# Patient Record
Sex: FEMALE | Employment: FULL TIME | URBAN - METROPOLITAN AREA
[De-identification: names, ages, dates, MRNs, and addresses within clinical notes are randomized per-mention and may not be internally consistent; named-entity substitution may affect disease eponyms.]

---

## 2017-01-23 RX ORDER — GABAPENTIN 300 MG/1
CAPSULE ORAL
Qty: 60 CAPSULE | Refills: 11 | Status: SHIPPED | OUTPATIENT
Start: 2017-01-23 | End: 2017-08-21

## 2017-02-19 RX ORDER — OMEPRAZOLE 40 MG/1
CAPSULE, DELAYED RELEASE ORAL
Qty: 90 CAPSULE | Refills: 0 | Status: SHIPPED | OUTPATIENT
Start: 2017-02-19 | End: 2017-06-17

## 2017-03-31 ENCOUNTER — OFFICE VISIT (OUTPATIENT)
Dept: RHEUMATOLOGY | Facility: CLINIC | Age: 57
End: 2017-03-31

## 2017-03-31 VITALS
HEART RATE: 64 BPM | HEIGHT: 61 IN | DIASTOLIC BLOOD PRESSURE: 79 MMHG | WEIGHT: 204.63 LBS | SYSTOLIC BLOOD PRESSURE: 149 MMHG | BODY MASS INDEX: 38.63 KG/M2 | TEMPERATURE: 98 F

## 2017-03-31 DIAGNOSIS — G89.29 CHRONIC PAIN OF LEFT KNEE: ICD-10-CM

## 2017-03-31 DIAGNOSIS — M15.9 PRIMARY OSTEOARTHRITIS INVOLVING MULTIPLE JOINTS: ICD-10-CM

## 2017-03-31 DIAGNOSIS — F32.A DEPRESSION, UNSPECIFIED DEPRESSION TYPE: Primary | ICD-10-CM

## 2017-03-31 DIAGNOSIS — M79.7 FIBROMYALGIA: ICD-10-CM

## 2017-03-31 DIAGNOSIS — M25.562 CHRONIC PAIN OF LEFT KNEE: ICD-10-CM

## 2017-03-31 PROCEDURE — 99213 OFFICE O/P EST LOW 20 MIN: CPT | Performed by: INTERNAL MEDICINE

## 2017-03-31 PROCEDURE — 20610 DRAIN/INJ JOINT/BURSA W/O US: CPT | Performed by: INTERNAL MEDICINE

## 2017-03-31 RX ORDER — METHYLPREDNISOLONE ACETATE 40 MG/ML
40 INJECTION, SUSPENSION INTRA-ARTICULAR; INTRALESIONAL; INTRAMUSCULAR; SOFT TISSUE ONCE
Status: COMPLETED | OUTPATIENT
Start: 2017-03-31 | End: 2017-03-31

## 2017-03-31 NOTE — PROGRESS NOTES
HPI:    Patient ID: Laurel High is a 61year old female. HPI Comments: Elsa Floyd is a 16-year-old woman with diffuse myofascial pain, depression, and osteoarthritis. Her son is with her to interpret.   Since I saw her November 28th of 2016, she is taking Delayed Release TOME GINNY CAPSULA POR VIA ORAL TODOS LOS FOSTER Disp: 90 capsule Rfl: 0   GABAPENTIN 300 MG Oral Cap TAKE 1-2 CAPSULES BY MOUTH AT BEDTIME Disp: 60 capsule Rfl: 11   FLUARIX QUADRIVALENT 0.5 ML Intramuscular Suspension Prefilled Syringe TO BE help.  I increased the dose to 150 mg every morning. This should help her fibromyalgia as well. 4.  Left knee osteoarthritis. After informed consent was obtained, the knee was injected with 40 mg of Depo-Medrol and 1 cc 1% lidocaine.   This was tolerated

## 2017-03-31 NOTE — PROCEDURES
Joint Injection  Pre-procedure care:  Consent was obtained, Procedure/risks were explained, Questions were answered, Patient was prepped and draped in the usual sterile fashion, Correct side and site confirmed and Correct patient identified  Depression, un

## 2017-06-15 ENCOUNTER — HOSPITAL ENCOUNTER (EMERGENCY)
Facility: HOSPITAL | Age: 57
Discharge: HOME | End: 2017-06-15
Attending: EMERGENCY MEDICINE
Payer: COMMERCIAL

## 2017-06-15 ENCOUNTER — APPOINTMENT (OUTPATIENT)
Dept: ULTRASOUND IMAGING | Facility: HOSPITAL | Age: 57
End: 2017-06-15
Attending: EMERGENCY MEDICINE
Payer: COMMERCIAL

## 2017-06-15 VITALS
WEIGHT: 240 LBS | HEART RATE: 62 BPM | HEIGHT: 62 IN | BODY MASS INDEX: 44.16 KG/M2 | RESPIRATION RATE: 18 BRPM | SYSTOLIC BLOOD PRESSURE: 134 MMHG | OXYGEN SATURATION: 97 % | DIASTOLIC BLOOD PRESSURE: 48 MMHG | TEMPERATURE: 97 F

## 2017-06-15 DIAGNOSIS — M25.562 ACUTE PAIN OF LEFT KNEE: Primary | ICD-10-CM

## 2017-06-15 DIAGNOSIS — M19.91 PRIMARY OSTEOARTHRITIS, UNSPECIFIED SITE: ICD-10-CM

## 2017-06-15 PROCEDURE — 99284 EMERGENCY DEPT VISIT MOD MDM: CPT

## 2017-06-15 PROCEDURE — 93971 EXTREMITY STUDY: CPT | Performed by: EMERGENCY MEDICINE

## 2017-06-15 RX ORDER — NAPROXEN 500 MG/1
500 TABLET ORAL 2 TIMES DAILY PRN
Qty: 20 TABLET | Refills: 0 | Status: SHIPPED | OUTPATIENT
Start: 2017-06-15 | End: 2017-06-22

## 2017-06-15 NOTE — ED INITIAL ASSESSMENT (HPI)
Pt reports left knee pain r/t \"walking a lot\". Pt denies injury/trauma. Pt reports hx of arthritis.

## 2017-06-15 NOTE — ED PROVIDER NOTES
Patient Seen in: [facility] AND [facility] Emergency Department    History   Patient presents with:  Knee Pain    Stated Complaint: left leg pain    HPI    Patient is a 59-year-old female with a history of arthritis in her left knee.   She speaks Korean a family Heart Disease Neg      CAD         Smoking Status: Never Smoker                      Alcohol Use: No                Review of Systems    Positive for stated complaint: left leg pain  Other systems are as noted in HPI.   Constitutional and vital signs review Neurological: Alert and oriented to person, place, and time. Normal reflexes. No cranial nerve deficit. No motor os sensory defecits noted Coordination normal.   Skin: Skin is warm and dry. Psychiatric: Normal mood and affect.  Behavior is normal. Judgm

## 2017-06-15 NOTE — ED NOTES
Report from \[facility]\"". Pt back from [address]. No distress noted. Pt updated on plan of care. No needs voiced.

## 2017-06-19 ENCOUNTER — OFFICE VISIT (OUTPATIENT)
Dept: RHEUMATOLOGY | Facility: CLINIC | Age: 57
End: 2017-06-19

## 2017-06-19 VITALS
SYSTOLIC BLOOD PRESSURE: 135 MMHG | DIASTOLIC BLOOD PRESSURE: 74 MMHG | HEIGHT: 61 IN | HEART RATE: 62 BPM | BODY MASS INDEX: 38.33 KG/M2 | WEIGHT: 203 LBS

## 2017-06-19 DIAGNOSIS — M19.91 PRIMARY OSTEOARTHRITIS, UNSPECIFIED SITE: Primary | ICD-10-CM

## 2017-06-19 DIAGNOSIS — F32.A DEPRESSION, UNSPECIFIED DEPRESSION TYPE: ICD-10-CM

## 2017-06-19 DIAGNOSIS — M79.7 FIBROMYALGIA: ICD-10-CM

## 2017-06-19 PROCEDURE — 99213 OFFICE O/P EST LOW 20 MIN: CPT | Performed by: INTERNAL MEDICINE

## 2017-06-19 PROCEDURE — 99212 OFFICE O/P EST SF 10 MIN: CPT | Performed by: INTERNAL MEDICINE

## 2017-06-19 RX ORDER — OMEPRAZOLE 40 MG/1
CAPSULE, DELAYED RELEASE ORAL
Qty: 90 CAPSULE | Refills: 1 | Status: SHIPPED | OUTPATIENT
Start: 2017-06-19 | End: 2018-05-12

## 2017-06-19 NOTE — TELEPHONE ENCOUNTER
LOV:3-28  Last Filled:2-19, #90 with 0 refill  Labs:   Future Appointments  Date Time Provider Robert Crocker   6/19/2017 11:20 AM Conception MD Afsaneh 2014 [facility]       Please Advise

## 2017-06-19 NOTE — PROGRESS NOTES
HPI:    Patient ID: Sena Echavarria is a 61year old female. HPI Comments: Allison Guerrero is a 23-year-old woman with diffuse myofascial pain, depression, and osteoarthritis. Her son is with her to interpret.   Since I saw her March 31st of 2017, she increased he Disp: 60 capsule Rfl: 11   FLUARIX QUADRIVALENT 0.5 ML Intramuscular Suspension Prefilled Syringe TO BE ADMINISTERED BY PHARMACIST FOR IMMUNIZATION Disp:  Rfl: 0   Ibuprofen 200 MG Oral Cap Take 1 tablet by mouth as needed.  Disp:  Rfl:    Fenofibrate 160 M week for 6 weeks, to work on an overall exercise program, but especially leg strengthening. If the knee continues to cause trouble, she will probably need an MRI done. She will return in 2 months.       No orders of the defined types were placed in this e

## 2017-06-26 ENCOUNTER — TELEPHONE (OUTPATIENT)
Dept: RHEUMATOLOGY | Facility: CLINIC | Age: 57
End: 2017-06-26

## 2017-06-26 NOTE — TELEPHONE ENCOUNTER
Namibian speaking pt-Pt states her job is requesting a note that states she is okay now to start carrying minimum 20 pounds. Pt states to please reflex the date of 7/5 on note.  Pt states she needs her job, states due to financial issues, states she will be

## 2017-06-27 NOTE — TELEPHONE ENCOUNTER
Via , Daniel Rosenberg #[id number], note generated to return to work without restriction per pt. Note mailed to pt's home.

## 2017-07-17 ENCOUNTER — OFFICE VISIT (OUTPATIENT)
Dept: PHYSICAL THERAPY | Facility: HOSPITAL | Age: 57
End: 2017-07-17
Attending: INTERNAL MEDICINE
Payer: COMMERCIAL

## 2017-07-17 DIAGNOSIS — M19.91 PRIMARY OSTEOARTHRITIS, UNSPECIFIED SITE: ICD-10-CM

## 2017-07-17 DIAGNOSIS — M79.7 FIBROMYALGIA: ICD-10-CM

## 2017-07-17 PROCEDURE — 97162 PT EVAL MOD COMPLEX 30 MIN: CPT

## 2017-07-17 PROCEDURE — 97110 THERAPEUTIC EXERCISES: CPT

## 2017-07-17 NOTE — PROGRESS NOTES
LOWER EXTREMITY EVALUATION:   Referring Physician: Dr. Lawyer Lin  Date of Onset: June 11, 2017 Date of Service: 7/17/2017   Diagnosis: Primary osteoarthritis, unspecified site (M19.91)  Fibromyalgia (M79.7)  PATIENT SUMMARY:   Paulo Clifton is a 61 year Foot/Ankle   Flexion: R=WFL,4+/5; L=WFL,4/5  Extension: R=WFL,4-/5; L=WFL,4-/5  Abduction: R=WFL,4+/5; L=WFL,4-/5 Flexion: R=112,4+/5; L=105,4-/5  Extension: R=0,4+/5; L=0,4-/5    Grossly 4+/5 throughout bilaterally     Flexibility:   Hip Flexor: R= minima has agreed to actively participate in planning and for this course of care. Thank you for your referral. Please co-sign or sign and return this letter via fax as soon as possible to [phone].  If you have any questions, please contact me at Dept: [phone]

## 2017-07-21 ENCOUNTER — TELEPHONE (OUTPATIENT)
Dept: PHYSICAL THERAPY | Facility: HOSPITAL | Age: 57
End: 2017-07-21

## 2017-07-21 ENCOUNTER — OFFICE VISIT (OUTPATIENT)
Dept: RHEUMATOLOGY | Facility: CLINIC | Age: 57
End: 2017-07-21

## 2017-07-21 ENCOUNTER — HOSPITAL ENCOUNTER (OUTPATIENT)
Dept: GENERAL RADIOLOGY | Facility: HOSPITAL | Age: 57
Discharge: HOME | End: 2017-07-21
Attending: INTERNAL MEDICINE
Payer: COMMERCIAL

## 2017-07-21 VITALS
DIASTOLIC BLOOD PRESSURE: 74 MMHG | WEIGHT: 200.69 LBS | TEMPERATURE: 98 F | HEIGHT: 61 IN | SYSTOLIC BLOOD PRESSURE: 157 MMHG | BODY MASS INDEX: 37.89 KG/M2 | HEART RATE: 58 BPM

## 2017-07-21 DIAGNOSIS — M25.562 CHRONIC PAIN OF LEFT KNEE: ICD-10-CM

## 2017-07-21 DIAGNOSIS — G89.29 CHRONIC PAIN OF LEFT KNEE: ICD-10-CM

## 2017-07-21 DIAGNOSIS — F32.A DEPRESSION, UNSPECIFIED DEPRESSION TYPE: ICD-10-CM

## 2017-07-21 DIAGNOSIS — M79.7 FIBROMYALGIA: ICD-10-CM

## 2017-07-21 DIAGNOSIS — M19.91 PRIMARY OSTEOARTHRITIS, UNSPECIFIED SITE: Primary | ICD-10-CM

## 2017-07-21 PROCEDURE — 99212 OFFICE O/P EST SF 10 MIN: CPT | Performed by: INTERNAL MEDICINE

## 2017-07-21 PROCEDURE — 99213 OFFICE O/P EST LOW 20 MIN: CPT | Performed by: INTERNAL MEDICINE

## 2017-07-21 PROCEDURE — 73560 X-RAY EXAM OF KNEE 1 OR 2: CPT | Performed by: INTERNAL MEDICINE

## 2017-07-21 RX ORDER — COVID-19 ANTIGEN TEST
220 KIT MISCELLANEOUS AS NEEDED
COMMUNITY
End: 2018-03-19

## 2017-07-21 RX ORDER — PREDNISONE 1 MG/1
TABLET ORAL
Qty: 42 TABLET | Refills: 0 | Status: SHIPPED | OUTPATIENT
Start: 2017-07-21 | End: 2017-08-21 | Stop reason: ALTCHOICE

## 2017-07-21 NOTE — PROGRESS NOTES
HPI:    Patient ID: Tristen Nieto is a 61year old female. Katelin Bolanos is a 50-year-old woman with diffuse myofascial pain, depression, and osteoarthritis. Since I saw her June 19th of 2017, she has remained on sertraline 150 mg every morning.   It is helpin (ZOLOFT) 100 MG Oral Tab Take one Q AM Disp: 30 tablet Rfl: 2     Allergies:No Known Allergies   PHYSICAL EXAM:   Physical Exam   Constitutional: She is oriented to person, place, and time. She appears well-developed and well-nourished.    HENT:   Head: Nor

## 2017-07-22 ENCOUNTER — HOSPITAL ENCOUNTER (OUTPATIENT)
Dept: MAMMOGRAPHY | Facility: HOSPITAL | Age: 57
Discharge: HOME | End: 2017-07-22
Attending: FAMILY MEDICINE
Payer: COMMERCIAL

## 2017-07-22 DIAGNOSIS — Z12.31 ENCOUNTER FOR SCREENING MAMMOGRAM FOR MALIGNANT NEOPLASM OF BREAST: ICD-10-CM

## 2017-07-22 PROCEDURE — 77067 SCR MAMMO BI INCL CAD: CPT | Performed by: FAMILY MEDICINE

## 2017-07-24 ENCOUNTER — OFFICE VISIT (OUTPATIENT)
Dept: PHYSICAL THERAPY | Facility: HOSPITAL | Age: 57
End: 2017-07-24
Attending: INTERNAL MEDICINE
Payer: COMMERCIAL

## 2017-07-24 DIAGNOSIS — M79.7 FIBROMYALGIA: ICD-10-CM

## 2017-07-24 DIAGNOSIS — M19.91 PRIMARY OSTEOARTHRITIS, UNSPECIFIED SITE: ICD-10-CM

## 2017-07-24 PROCEDURE — 97530 THERAPEUTIC ACTIVITIES: CPT

## 2017-07-24 PROCEDURE — 97110 THERAPEUTIC EXERCISES: CPT

## 2017-07-28 ENCOUNTER — OFFICE VISIT (OUTPATIENT)
Dept: PHYSICAL THERAPY | Facility: HOSPITAL | Age: 57
End: 2017-07-28
Attending: INTERNAL MEDICINE
Payer: COMMERCIAL

## 2017-07-28 DIAGNOSIS — M79.7 FIBROMYALGIA: ICD-10-CM

## 2017-07-28 DIAGNOSIS — M19.91 PRIMARY OSTEOARTHRITIS, UNSPECIFIED SITE: ICD-10-CM

## 2017-07-28 PROCEDURE — 97530 THERAPEUTIC ACTIVITIES: CPT

## 2017-07-28 PROCEDURE — 97110 THERAPEUTIC EXERCISES: CPT

## 2017-07-28 NOTE — PROGRESS NOTES
Diagnosis:Primary osteoarthritis, unspecified site (M19.91)  Fibromyalgia (M79.7)  Visit #:  3 ([address] [facility] [address]      Next MD visit: none scheduled  Fall Risk: standard         Precautions: n/a           Medication Changes since last visit?: Aby Singer

## 2017-07-31 ENCOUNTER — OFFICE VISIT (OUTPATIENT)
Dept: PHYSICAL THERAPY | Facility: HOSPITAL | Age: 57
End: 2017-07-31
Attending: INTERNAL MEDICINE
Payer: COMMERCIAL

## 2017-07-31 DIAGNOSIS — M79.7 FIBROMYALGIA: ICD-10-CM

## 2017-07-31 DIAGNOSIS — M19.91 PRIMARY OSTEOARTHRITIS, UNSPECIFIED SITE: ICD-10-CM

## 2017-07-31 PROCEDURE — 97530 THERAPEUTIC ACTIVITIES: CPT

## 2017-07-31 PROCEDURE — 97110 THERAPEUTIC EXERCISES: CPT

## 2017-07-31 NOTE — PROGRESS NOTES
Diagnosis:Primary osteoarthritis, unspecified site (M19.91)  Fibromyalgia (M79.7)  Visit #:  4 ([address] [facility]      Next MD visit: none scheduled  Fall Risk: standard         Precautions: n/a           Medication Changes since last visit?: Daryl Gottron

## 2017-08-04 ENCOUNTER — OFFICE VISIT (OUTPATIENT)
Dept: PHYSICAL THERAPY | Facility: HOSPITAL | Age: 57
End: 2017-08-04
Attending: INTERNAL MEDICINE
Payer: COMMERCIAL

## 2017-08-04 DIAGNOSIS — M19.91 PRIMARY OSTEOARTHRITIS, UNSPECIFIED SITE: ICD-10-CM

## 2017-08-04 DIAGNOSIS — M79.7 FIBROMYALGIA: ICD-10-CM

## 2017-08-04 PROCEDURE — 97530 THERAPEUTIC ACTIVITIES: CPT

## 2017-08-04 PROCEDURE — 97110 THERAPEUTIC EXERCISES: CPT

## 2017-08-04 NOTE — PROGRESS NOTES
Diagnosis:Primary osteoarthritis, unspecified site (M19.91)  Fibromyalgia (M79.7)  Visit #:  5 ([address] [facility] [address]      Next MD visit: 8/21/17  Fall Risk: standard         Precautions: n/a           Medication Changes since last visit?: No  Rochelle Martini

## 2017-08-07 ENCOUNTER — APPOINTMENT (OUTPATIENT)
Dept: PHYSICAL THERAPY | Facility: HOSPITAL | Age: 57
End: 2017-08-07
Attending: INTERNAL MEDICINE
Payer: COMMERCIAL

## 2017-08-11 ENCOUNTER — OFFICE VISIT (OUTPATIENT)
Dept: PHYSICAL THERAPY | Facility: HOSPITAL | Age: 57
End: 2017-08-11
Attending: INTERNAL MEDICINE
Payer: COMMERCIAL

## 2017-08-11 DIAGNOSIS — M19.91 PRIMARY OSTEOARTHRITIS, UNSPECIFIED SITE: ICD-10-CM

## 2017-08-11 DIAGNOSIS — M79.7 FIBROMYALGIA: ICD-10-CM

## 2017-08-11 PROCEDURE — 97110 THERAPEUTIC EXERCISES: CPT

## 2017-08-11 PROCEDURE — 97530 THERAPEUTIC ACTIVITIES: CPT

## 2017-08-11 NOTE — PROGRESS NOTES
Diagnosis:Primary osteoarthritis, unspecified site (M19.91)  Fibromyalgia (M79.7)  Visit #:  6 ([address] [facility] [address]      Next MD visit: 8/21/17  Fall Risk: standard         Precautions: n/a           Medication Changes since last visit?: No  Rochelle Matrini

## 2017-08-18 ENCOUNTER — OFFICE VISIT (OUTPATIENT)
Dept: PHYSICAL THERAPY | Facility: HOSPITAL | Age: 57
End: 2017-08-18
Attending: INTERNAL MEDICINE
Payer: COMMERCIAL

## 2017-08-18 PROCEDURE — 97110 THERAPEUTIC EXERCISES: CPT

## 2017-08-18 PROCEDURE — 97530 THERAPEUTIC ACTIVITIES: CPT

## 2017-08-18 NOTE — PROGRESS NOTES
Patient Name: Candace Silver, : 3/10/1960, MRN: [id number]   Date:  2017  Referring Physician:  Eugene Puente    Diagnosis:Primary osteoarthritis, unspecified site (M19.91)  Fibromyalgia (M79.7)      Progress Summary    Pt has attended 7 vi good    Plan: Continue skilled Physical Therapy 1-2 x/week or a total of 3-4 visits over a 90 day period.  Treatment will include: LE/functional strengthening, reassessment, HEP    Patient was advised of these findings, precautions, and treatment options an

## 2017-08-18 NOTE — PROGRESS NOTES
Diagnosis:Primary osteoarthritis, unspecified site (M19.91)  Fibromyalgia (M79.7)  Visit #:  7 ([address] [facility]      Next MD visit: 8/21/17  Fall Risk: standard         Precautions: n/a           Medication Changes since last visit?: No  Jeannettepaola Kate toward greater/painfree functional independence--IN PROGRESS            Plan: Send note to MD    Charges:  TherEx 2, TA x 1       Total Timed Treatment: 42  min  Total Treatment Time: 44 min

## 2017-08-21 ENCOUNTER — OFFICE VISIT (OUTPATIENT)
Dept: RHEUMATOLOGY | Facility: CLINIC | Age: 57
End: 2017-08-21

## 2017-08-21 VITALS
SYSTOLIC BLOOD PRESSURE: 115 MMHG | WEIGHT: 202.88 LBS | HEART RATE: 69 BPM | DIASTOLIC BLOOD PRESSURE: 70 MMHG | HEIGHT: 61 IN | BODY MASS INDEX: 38.3 KG/M2

## 2017-08-21 DIAGNOSIS — M79.7 FIBROMYALGIA: ICD-10-CM

## 2017-08-21 DIAGNOSIS — F32.A DEPRESSION, UNSPECIFIED DEPRESSION TYPE: ICD-10-CM

## 2017-08-21 DIAGNOSIS — M19.91 PRIMARY OSTEOARTHRITIS, UNSPECIFIED SITE: Primary | ICD-10-CM

## 2017-08-21 PROCEDURE — 99213 OFFICE O/P EST LOW 20 MIN: CPT | Performed by: INTERNAL MEDICINE

## 2017-08-21 PROCEDURE — 99212 OFFICE O/P EST SF 10 MIN: CPT | Performed by: INTERNAL MEDICINE

## 2017-08-21 NOTE — PROGRESS NOTES
Branden Khalil is a 50-year-old woman with diffuse myofascial pain, depression, and osteoarthritis. Since I saw her July 21st 2017, she has remained on sertraline 150 mg every morning. It is helping more. She continues to have pain diffusely, but more managable. Rfl: 2     Allergies:No Known Allergies   PHYSICAL EXAM:   Physical Exam   Constitutional: She is oriented to person, place, and time. She appears well-developed and well-nourished. HENT:   Head: Normocephalic.    Eyes: Conjunctivae are normal.   Musculos

## 2017-12-15 NOTE — PROGRESS NOTES
Cici Callahan is a 58-year-old woman with diffuse myofascial pain, depression, and osteoarthritis. Since I saw her August 21st 2017, she has remained on sertraline 150 mg every morning. It is helping.   She continues to have pain diffusely, but more managable.    Physical Exam   Constitutional: She is oriented to person, place, and time. She appears well-developed and well-nourished. HENT:   Head: Normocephalic. Eyes: Conjunctivae are normal.   Musculoskeletal:    She walks without a limp.   There is mild tend

## 2017-12-18 ENCOUNTER — OFFICE VISIT (OUTPATIENT)
Dept: RHEUMATOLOGY | Facility: CLINIC | Age: 57
End: 2017-12-18

## 2017-12-18 VITALS
HEIGHT: 61 IN | HEART RATE: 73 BPM | DIASTOLIC BLOOD PRESSURE: 72 MMHG | SYSTOLIC BLOOD PRESSURE: 116 MMHG | WEIGHT: 201.88 LBS | BODY MASS INDEX: 38.11 KG/M2

## 2017-12-18 DIAGNOSIS — F32.A DEPRESSION, UNSPECIFIED DEPRESSION TYPE: ICD-10-CM

## 2017-12-18 DIAGNOSIS — M19.91 PRIMARY OSTEOARTHRITIS, UNSPECIFIED SITE: Primary | ICD-10-CM

## 2017-12-18 PROCEDURE — 99213 OFFICE O/P EST LOW 20 MIN: CPT | Performed by: INTERNAL MEDICINE

## 2017-12-18 PROCEDURE — 99212 OFFICE O/P EST SF 10 MIN: CPT | Performed by: INTERNAL MEDICINE

## 2017-12-18 RX ORDER — NORTRIPTYLINE HYDROCHLORIDE 10 MG/1
CAPSULE ORAL
Qty: 60 CAPSULE | Refills: 5 | Status: SHIPPED | OUTPATIENT
Start: 2017-12-18 | End: 2018-03-19

## 2017-12-18 RX ORDER — MELOXICAM 15 MG/1
15 TABLET ORAL DAILY
Status: ON HOLD | COMMUNITY
Start: 2017-11-15 | End: 2018-10-30

## 2018-03-15 NOTE — PROGRESS NOTES
Los Cooney is a 62-year-old woman with diffuse myofascial pain, depression, and osteoarthritis. Since I saw her December 18th 2017, she has remained on sertraline 150 mg every morning. It is helping.   She has also been taking nortriptyline 10 or 20 mg at bedti PHYSICAL EXAM:   Physical Exam   Constitutional: She is oriented to person, place, and time. She appears well-developed and well-nourished. HENT:   Head: Normocephalic. Eyes: Conjunctivae are normal.   Musculoskeletal:    She walks without a limp.   [facility]

## 2018-03-19 ENCOUNTER — OFFICE VISIT (OUTPATIENT)
Dept: RHEUMATOLOGY | Facility: CLINIC | Age: 58
End: 2018-03-19

## 2018-03-19 VITALS
HEIGHT: 61 IN | DIASTOLIC BLOOD PRESSURE: 75 MMHG | SYSTOLIC BLOOD PRESSURE: 119 MMHG | WEIGHT: 205.88 LBS | HEART RATE: 66 BPM | BODY MASS INDEX: 38.87 KG/M2

## 2018-03-19 DIAGNOSIS — M79.7 FIBROMYALGIA: ICD-10-CM

## 2018-03-19 DIAGNOSIS — F32.A DEPRESSION, UNSPECIFIED DEPRESSION TYPE: ICD-10-CM

## 2018-03-19 DIAGNOSIS — M19.91 PRIMARY OSTEOARTHRITIS, UNSPECIFIED SITE: Primary | ICD-10-CM

## 2018-03-19 PROCEDURE — 99212 OFFICE O/P EST SF 10 MIN: CPT | Performed by: INTERNAL MEDICINE

## 2018-03-19 PROCEDURE — 99213 OFFICE O/P EST LOW 20 MIN: CPT | Performed by: INTERNAL MEDICINE

## 2018-03-19 RX ORDER — NORTRIPTYLINE HYDROCHLORIDE 10 MG/1
CAPSULE ORAL
Qty: 180 CAPSULE | Refills: 3 | Status: SHIPPED | OUTPATIENT
Start: 2018-03-19 | End: 2020-03-02

## 2018-03-20 ENCOUNTER — TELEPHONE (OUTPATIENT)
Dept: RHEUMATOLOGY | Facility: CLINIC | Age: 58
End: 2018-03-20

## 2018-03-20 NOTE — TELEPHONE ENCOUNTER
Pt came in to see  yesterday and received a note for her employer. Unfortunately, the note is not what the employer wants. The employer needs the note to state that she's clear to work back to work. pls advise. Thank you    Pt can pick it up the letter.

## 2018-03-20 NOTE — TELEPHONE ENCOUNTER
Spoke with pt and informed let her know letter for work is ready for pickup in [place]. Address given patient verbalized understanding.

## 2018-04-16 ENCOUNTER — OFFICE VISIT (OUTPATIENT)
Dept: FAMILY MEDICINE CLINIC | Facility: CLINIC | Age: 58
End: 2018-04-16

## 2018-04-16 ENCOUNTER — LAB ENCOUNTER (OUTPATIENT)
Dept: LAB | Facility: HOSPITAL | Age: 58
End: 2018-04-16
Attending: FAMILY MEDICINE
Payer: COMMERCIAL

## 2018-04-16 VITALS
SYSTOLIC BLOOD PRESSURE: 137 MMHG | BODY MASS INDEX: 36.8 KG/M2 | HEIGHT: 62 IN | HEART RATE: 69 BPM | DIASTOLIC BLOOD PRESSURE: 77 MMHG | TEMPERATURE: 97 F | WEIGHT: 200 LBS

## 2018-04-16 DIAGNOSIS — E01.0 THYROMEGALY: ICD-10-CM

## 2018-04-16 DIAGNOSIS — M79.7 FIBROMYALGIA: ICD-10-CM

## 2018-04-16 DIAGNOSIS — R49.0 HOARSE VOICE QUALITY: ICD-10-CM

## 2018-04-16 DIAGNOSIS — H92.02 OTALGIA, LEFT: ICD-10-CM

## 2018-04-16 DIAGNOSIS — M19.91 PRIMARY OSTEOARTHRITIS, UNSPECIFIED SITE: ICD-10-CM

## 2018-04-16 DIAGNOSIS — G47.33 OBSTRUCTIVE SLEEP APNEA SYNDROME: Primary | ICD-10-CM

## 2018-04-16 DIAGNOSIS — G47.33 OBSTRUCTIVE SLEEP APNEA SYNDROME: ICD-10-CM

## 2018-04-16 DIAGNOSIS — R53.82 CHRONIC FATIGUE: ICD-10-CM

## 2018-04-16 PROCEDURE — 82306 VITAMIN D 25 HYDROXY: CPT

## 2018-04-16 PROCEDURE — 80050 GENERAL HEALTH PANEL: CPT

## 2018-04-16 PROCEDURE — 99212 OFFICE O/P EST SF 10 MIN: CPT | Performed by: FAMILY MEDICINE

## 2018-04-16 PROCEDURE — 84443 ASSAY THYROID STIM HORMONE: CPT

## 2018-04-16 PROCEDURE — 36415 COLL VENOUS BLD VENIPUNCTURE: CPT

## 2018-04-16 PROCEDURE — 99205 OFFICE O/P NEW HI 60 MIN: CPT | Performed by: FAMILY MEDICINE

## 2018-04-16 PROCEDURE — 80061 LIPID PANEL: CPT

## 2018-04-16 PROCEDURE — 85025 COMPLETE CBC W/AUTO DIFF WBC: CPT

## 2018-04-16 NOTE — PROGRESS NOTES
Fatigue  Hot flashes  Sweating all the time paula at work  Does assembly and packing at a warehouse. Pain in hands.     Has pain in fingers and knees    Years of throat discomfort and hoarse  Voice hoarse for years  >5 years  Snores heroically \"for years an THYROID STIM HORMONE; Future  - VITAMIN D, 25-HYDROXY; Future  - OP [facility] ALT REFERRAL HOME SLEEP APNEA TEST    3. Fibromyalgia  Stable  Nonrestorative sleep    4. Primary osteoarthritis, unspecified site  Follow-up with rheumatology    5.  Thyromegaly  Check

## 2018-04-23 ENCOUNTER — TELEPHONE (OUTPATIENT)
Dept: FAMILY MEDICINE CLINIC | Facility: CLINIC | Age: 58
End: 2018-04-23

## 2018-04-23 ENCOUNTER — HOSPITAL ENCOUNTER (OUTPATIENT)
Dept: ULTRASOUND IMAGING | Age: 58
Discharge: HOME | End: 2018-04-23
Attending: FAMILY MEDICINE
Payer: COMMERCIAL

## 2018-04-23 DIAGNOSIS — E01.0 THYROMEGALY: ICD-10-CM

## 2018-04-23 DIAGNOSIS — R53.82 CHRONIC FATIGUE: ICD-10-CM

## 2018-04-23 PROCEDURE — 76536 US EXAM OF HEAD AND NECK: CPT | Performed by: FAMILY MEDICINE

## 2018-04-23 NOTE — TELEPHONE ENCOUNTER
----- Message from Philippe Cameron DO sent at 4/23/2018  2:44 PM CDT -----  There is a nodule in the right lobe of the thyroid. There is enlargement of the thyroid as well. Follow-up with Dr. Javed Thacker for enlargement of the thyroid.

## 2018-04-24 ENCOUNTER — OFFICE VISIT (OUTPATIENT)
Dept: OTOLARYNGOLOGY | Facility: CLINIC | Age: 58
End: 2018-04-24

## 2018-04-24 VITALS
WEIGHT: 200 LBS | TEMPERATURE: 97 F | DIASTOLIC BLOOD PRESSURE: 82 MMHG | BODY MASS INDEX: 36.8 KG/M2 | SYSTOLIC BLOOD PRESSURE: 120 MMHG | HEIGHT: 62 IN

## 2018-04-24 DIAGNOSIS — G47.33 OBSTRUCTIVE SLEEP APNEA: Primary | ICD-10-CM

## 2018-04-24 DIAGNOSIS — R49.0 HOARSENESS: ICD-10-CM

## 2018-04-24 PROCEDURE — 99212 OFFICE O/P EST SF 10 MIN: CPT | Performed by: OTOLARYNGOLOGY

## 2018-04-24 PROCEDURE — 99243 OFF/OP CNSLTJ NEW/EST LOW 30: CPT | Performed by: OTOLARYNGOLOGY

## 2018-04-24 RX ORDER — FLUTICASONE PROPIONATE 50 MCG
1 SPRAY, SUSPENSION (ML) NASAL 2 TIMES DAILY
Qty: 1 BOTTLE | Refills: 3 | Status: SHIPPED | OUTPATIENT
Start: 2018-04-24 | End: 2018-11-22

## 2018-04-24 RX ORDER — MONTELUKAST SODIUM 10 MG/1
10 TABLET ORAL NIGHTLY
Qty: 30 TABLET | Refills: 3 | Status: SHIPPED | OUTPATIENT
Start: 2018-04-24 | End: 2018-08-04

## 2018-04-24 NOTE — TELEPHONE ENCOUNTER
Patient calling - verified patient's name and  - informed patient of doctor's note, provided phone # for Dr Burgess Dockery,  patient verbalized understanding. Mailed copy of US results to pt.

## 2018-04-24 NOTE — PROGRESS NOTES
Álvaro Lazar is a 60year old female.   Patient presents with:  Snoring: referred by Dr.C Francisco Cameron  for sleep apnea  Voice Problem: pt reports hoarseness for a while      HISTORY OF PRESENT ILLNESS  She presents with a history of skin snoring as noted by irregular heartbeat/palpitations and syncope. GI Negative Abdominal pain and diarrhea. Endocrine Negative Cold intolerance and heat intolerance. Neuro Negative Tremors. Psych Negative Anxiety and depression.    Integumentary Negative Frequent skin i Questions were answered. Correct patient identified. Correct side and site confirmed. A topical spray of ). 25% Neosynephrine was sprayed into the nose.     Laryngoscopy:  Flexible Fiberoptic Laryngoscopy: A diagnostic flexible fiberoptic laryngoscopy w tablet (160MG)  by oral route  every day, Disp: , Rfl:   •  simvastatin (ZOCOR) 20 MG Oral Tab, Take  by mouth. take 1 tablet (20MG)  by oral route  every day in the evening, Disp: , Rfl:   ASSESSMENT AND PLAN    1. Obstructive sleep apnea    2.  Hoarseness

## 2018-05-01 ENCOUNTER — TELEPHONE (OUTPATIENT)
Dept: OTHER | Age: 58
End: 2018-05-01

## 2018-05-01 DIAGNOSIS — E78.5 DYSLIPIDEMIA WITH ELEVATED LOW DENSITY LIPOPROTEIN (LDL) CHOLESTEROL AND ABNORMALLY LOW HIGH DENSITY LIPOPROTEIN CHOLESTEROL: ICD-10-CM

## 2018-05-01 DIAGNOSIS — E55.9 VITAMIN D DEFICIENCY: Primary | ICD-10-CM

## 2018-05-01 DIAGNOSIS — R73.9 ELEVATED BLOOD SUGAR: ICD-10-CM

## 2018-05-01 RX ORDER — ERGOCALCIFEROL 1.25 MG/1
50000 CAPSULE ORAL WEEKLY
Qty: 12 CAPSULE | Refills: 3 | Status: SHIPPED | OUTPATIENT
Start: 2018-05-01 | End: 2019-03-25

## 2018-05-01 NOTE — TELEPHONE ENCOUNTER
Notes recorded by Johnny Rubio DO on 4/24/2018 at 1:49 PM CDT  Vitamin D level was low.  If she has never had any kidney stones, she should begin vitamin D 50,000 units 1 p.o. weekly #12 with 3 refills.   Mildly elevated blood sugar mildly elevated b

## 2018-05-07 ENCOUNTER — OFFICE VISIT (OUTPATIENT)
Dept: FAMILY MEDICINE CLINIC | Facility: CLINIC | Age: 58
End: 2018-05-07

## 2018-05-07 VITALS
DIASTOLIC BLOOD PRESSURE: 78 MMHG | BODY MASS INDEX: 37.17 KG/M2 | WEIGHT: 202 LBS | HEART RATE: 72 BPM | SYSTOLIC BLOOD PRESSURE: 119 MMHG | HEIGHT: 62 IN

## 2018-05-07 DIAGNOSIS — E78.00 HIGH CHOLESTEROL: Primary | ICD-10-CM

## 2018-05-07 DIAGNOSIS — R53.82 CHRONIC FATIGUE: ICD-10-CM

## 2018-05-07 DIAGNOSIS — M79.7 FIBROMYALGIA: ICD-10-CM

## 2018-05-07 DIAGNOSIS — E01.0 THYROMEGALY: ICD-10-CM

## 2018-05-07 DIAGNOSIS — E78.1 HYPERTRIGLYCERIDEMIA: ICD-10-CM

## 2018-05-07 DIAGNOSIS — R79.89 LOW VITAMIN D LEVEL: ICD-10-CM

## 2018-05-07 DIAGNOSIS — E66.09 CLASS 2 OBESITY DUE TO EXCESS CALORIES WITHOUT SERIOUS COMORBIDITY WITH BODY MASS INDEX (BMI) OF 36.0 TO 36.9 IN ADULT: ICD-10-CM

## 2018-05-07 PROBLEM — E66.812 CLASS 2 OBESITY DUE TO EXCESS CALORIES WITHOUT SERIOUS COMORBIDITY WITH BODY MASS INDEX (BMI) OF 36.0 TO 36.9 IN ADULT: Status: ACTIVE | Noted: 2018-05-07

## 2018-05-07 PROCEDURE — 99214 OFFICE O/P EST MOD 30 MIN: CPT | Performed by: FAMILY MEDICINE

## 2018-05-07 PROCEDURE — 99212 OFFICE O/P EST SF 10 MIN: CPT | Performed by: FAMILY MEDICINE

## 2018-05-07 NOTE — PROGRESS NOTES
Has appt eith Dr. Solis Ear due to enlargement of thyroid    Lacks vit d    Chol still elevated despite simvastatin and fenofibrate. Has mildly jaime ldl cho  Trigs good. Weight difficult to control.         Patient's past medical surgical family social

## 2018-05-11 ENCOUNTER — OFFICE VISIT (OUTPATIENT)
Dept: ENDOCRINOLOGY CLINIC | Facility: CLINIC | Age: 58
End: 2018-05-11

## 2018-05-11 VITALS
WEIGHT: 203.63 LBS | HEIGHT: 62 IN | SYSTOLIC BLOOD PRESSURE: 132 MMHG | BODY MASS INDEX: 37.47 KG/M2 | DIASTOLIC BLOOD PRESSURE: 88 MMHG | HEART RATE: 68 BPM

## 2018-05-11 DIAGNOSIS — E04.1 THYROID NODULE: Primary | ICD-10-CM

## 2018-05-11 PROCEDURE — 99243 OFF/OP CNSLTJ NEW/EST LOW 30: CPT | Performed by: INTERNAL MEDICINE

## 2018-05-11 PROCEDURE — 99212 OFFICE O/P EST SF 10 MIN: CPT | Performed by: INTERNAL MEDICINE

## 2018-05-11 NOTE — H&P
New Patient Evaluation - History and Physical    CONSULT - Reason for Visit: Thyroid nodule  Requesting Physician: Dr. Shyanne Mendes:    Thyroid nodule     HISTORY OF PRESENT ILLNESS:   Emily Castillo is a 60year old female who presents for e by mouth nightly.  Disp: 30 tablet Rfl: 3       ALLERGIES:  No Known Allergies    SOCIAL HISTORY:    Social History    Marital status:              Spouse name:                       Years of education:                 Number of children: normal  ENT:  Normocephalic, atraumatic  NECK:  Supple, symmetrical, trachea midline, no adenopathy, thyroid symmetric, not enlarged and no tenderness  HEMATOLOGIC/LYMPHATICS:  no cervical lymphadenopathy and no supraclavicular lymphadenopathy  LUNGS: fabiano

## 2018-05-14 RX ORDER — OMEPRAZOLE 40 MG/1
CAPSULE, DELAYED RELEASE ORAL
Qty: 90 CAPSULE | Refills: 1 | Status: SHIPPED | OUTPATIENT
Start: 2018-05-14 | End: 2018-11-11

## 2018-05-14 NOTE — TELEPHONE ENCOUNTER
LOV:3-20  Last Filled:6-19, #90 with 1 refill  Labs:   Future Appointments  Date Time Provider Robert Zepedai   6/5/2018 11:30 AM Yang Frederick MD 40 [facility]   9/17/2018 11:40 AM Bogdan Casiano MD 2014 [facility]       Please Advise

## 2018-06-04 ENCOUNTER — OFFICE VISIT (OUTPATIENT)
Dept: SLEEP CENTER | Age: 58
End: 2018-06-04
Attending: FAMILY MEDICINE
Payer: COMMERCIAL

## 2018-06-04 DIAGNOSIS — R53.82 CHRONIC FATIGUE: Primary | ICD-10-CM

## 2018-06-04 PROCEDURE — 95806 SLEEP STUDY UNATT&RESP EFFT: CPT

## 2018-06-05 ENCOUNTER — OFFICE VISIT (OUTPATIENT)
Dept: OTOLARYNGOLOGY | Facility: CLINIC | Age: 58
End: 2018-06-05

## 2018-06-05 VITALS
TEMPERATURE: 98 F | DIASTOLIC BLOOD PRESSURE: 84 MMHG | WEIGHT: 200 LBS | SYSTOLIC BLOOD PRESSURE: 152 MMHG | HEIGHT: 62 IN | BODY MASS INDEX: 36.8 KG/M2

## 2018-06-05 DIAGNOSIS — R49.0 HOARSENESS: ICD-10-CM

## 2018-06-05 DIAGNOSIS — G47.33 OBSTRUCTIVE SLEEP APNEA: Primary | ICD-10-CM

## 2018-06-05 PROCEDURE — 99212 OFFICE O/P EST SF 10 MIN: CPT | Performed by: OTOLARYNGOLOGY

## 2018-06-05 PROCEDURE — 99214 OFFICE O/P EST MOD 30 MIN: CPT | Performed by: OTOLARYNGOLOGY

## 2018-06-05 NOTE — PROGRESS NOTES
Juancarlos Rosales is a 60year old female. Patient presents with:   Follow - Up: 1 month follow up- horseness of voice- per pt there is some changes/improvement of symptoms  Follow - Up: sleep study done yesterday june 4,2018      HISTORY OF PRESENT ILLNESS Fibromyalgia    • High cholesterol    • Hyperlipidemia        Past Surgical History:  No date: OTHER SURGICAL HISTORY      Comment: gall stones      REVIEW OF SYSTEMS    System Neg/Pos Details   Constitutional Negative Fatigue, fever and weight loss.    ENM Tonsils - Normal. Oropharynx - Normal.   Nose/Mouth/Throat Normal Nares - Right: Normal Left: Normal. Septum -Normal  Turbinates - Right: Normal, Left: Normal.       Current Outpatient Prescriptions:   •  OMEPRAZOLE 40 MG Oral Capsule Delayed ReleaseMADAY

## 2018-06-07 NOTE — PROCEDURES
[address] [place] [address]  Accredited by the [employer] ([employer]ASM)    PATIENT'S NAME: Dina Ann   ATTENDING PHYSICIAN: Karley Durham. Francisco Cameron DO   REFERRING PHYSICIAN: Karley Durham.  Francisco Cameron DO   PATIENT ACCOUNT #: [de-identified] LOCATION from this study is consistent with severe obstructive sleep apnea (ICD-10 code G47.33). RECOMMENDATIONS:    1. CPAP titration  2. Weight loss. 3.   Avoid alcohol. 4.   Avoid sedating drug. 5.   Patient should not drive if at all sleepy.     Please

## 2018-06-19 ENCOUNTER — TELEPHONE (OUTPATIENT)
Dept: FAMILY MEDICINE CLINIC | Facility: CLINIC | Age: 58
End: 2018-06-19

## 2018-06-19 DIAGNOSIS — G47.33 SEVERE OBSTRUCTIVE SLEEP APNEA: Primary | ICD-10-CM

## 2018-06-21 NOTE — TELEPHONE ENCOUNTER
LMTCB-ext [id number] today only until 4:30pm      Notes recorded by Aleta Jenkins DO on 6/9/2018 at 2:11 PM CDT  Patient has severe obstructive sleep apnea she stops breathing about 30 times per hour.  She should have a CPAP titration.  Please order CPAP

## 2018-07-03 ENCOUNTER — OFFICE VISIT (OUTPATIENT)
Dept: OTOLARYNGOLOGY | Facility: CLINIC | Age: 58
End: 2018-07-03

## 2018-07-03 VITALS
SYSTOLIC BLOOD PRESSURE: 119 MMHG | DIASTOLIC BLOOD PRESSURE: 74 MMHG | HEART RATE: 71 BPM | TEMPERATURE: 98 F | HEIGHT: 62 IN | BODY MASS INDEX: 36.8 KG/M2 | WEIGHT: 200 LBS

## 2018-07-03 DIAGNOSIS — G47.33 OBSTRUCTIVE SLEEP APNEA: ICD-10-CM

## 2018-07-03 DIAGNOSIS — R49.0 HOARSENESS: Primary | ICD-10-CM

## 2018-07-03 PROCEDURE — 99214 OFFICE O/P EST MOD 30 MIN: CPT | Performed by: OTOLARYNGOLOGY

## 2018-07-03 PROCEDURE — 99212 OFFICE O/P EST SF 10 MIN: CPT | Performed by: OTOLARYNGOLOGY

## 2018-07-03 NOTE — PROGRESS NOTES
Melissa Jenkins is a 60year old female. Patient presents with: Follow - Up: Pt presents with 1 month f/u for horseness. Pt states little better with medication. denies pain.       HISTORY OF PRESENT ILLNESS  She presents with a history of snoring as note hyperlipidemia   • Stroke Mother      CVA   • Diabetes Mother    • Cancer Neg    • Heart Disease Neg      CAD       Past Medical History:   Diagnosis Date   • Arthritis    • Esophageal reflux    • Fibromyalgia    • High cholesterol    • Hyperlipidemia - Normal.        Lymph Detail Normal Submental. Submandibular. Anterior cervical. Posterior cervical. Supraclavicular.         Nose/Mouth/Throat Normal External nose - Normal. Lips/teeth/gums - Normal. Tonsils - Normal. Oropharynx - Normal.   Nose/Mouth/Thr CWP.            Reuben Collier.  Cherylene Scotts, MD    7/3/2018    1:04 PM

## 2018-08-08 RX ORDER — MONTELUKAST SODIUM 10 MG/1
10 TABLET ORAL NIGHTLY
Qty: 30 TABLET | Refills: 0 | Status: SHIPPED | OUTPATIENT
Start: 2018-08-08 | End: 2019-02-16

## 2018-08-15 ENCOUNTER — TELEPHONE (OUTPATIENT)
Dept: OTOLARYNGOLOGY | Facility: CLINIC | Age: 58
End: 2018-08-15

## 2018-08-15 NOTE — TELEPHONE ENCOUNTER
Patient states she was not able to get CPAP machine since DME location was out of network. Would like a call back from clinical staff to see what else she can do. Please call. Thank you.

## 2018-08-20 NOTE — TELEPHONE ENCOUNTER
, please see note below. Per [facility] for Autopap, pt insurance has co pay of 20% which patient states she cannot afford to pay at this time even with payment plan offered. Per pt she cannot get Autopap at this time.

## 2018-08-20 NOTE — TELEPHONE ENCOUNTER
MASSIELTCB-Spoke with [facility], [phone], pt is not out of network, pt has 20% copay and pt does not want to pay, per [facility] they offered pt patient plan and pt refused.

## 2018-09-14 NOTE — PROGRESS NOTES
Gerson rob is a 67-year-old woman with diffuse myofascial pain, depression, and osteoarthritis. Since I saw her March 19th of 2018, she has remained on sertraline 150 mg every morning. It is helping.   She has also been taking nortriptyline 10 or 20 mg at bedti and well-nourished. HENT:   Head: Normocephalic. Eyes: Conjunctivae are normal.   Musculoskeletal:    She walks without a limp. There is mild tenderness to palpation of the muscles across her upper back and shoulders.   Her knees flex and extend fully

## 2018-09-17 ENCOUNTER — OFFICE VISIT (OUTPATIENT)
Dept: RHEUMATOLOGY | Facility: CLINIC | Age: 58
End: 2018-09-17
Payer: COMMERCIAL

## 2018-09-17 VITALS
HEART RATE: 67 BPM | DIASTOLIC BLOOD PRESSURE: 77 MMHG | BODY MASS INDEX: 37.04 KG/M2 | SYSTOLIC BLOOD PRESSURE: 119 MMHG | HEIGHT: 62 IN | WEIGHT: 201.31 LBS

## 2018-09-17 DIAGNOSIS — M79.7 FIBROMYALGIA: Primary | ICD-10-CM

## 2018-09-17 DIAGNOSIS — F32.A DEPRESSION, UNSPECIFIED DEPRESSION TYPE: ICD-10-CM

## 2018-09-17 DIAGNOSIS — M19.91 PRIMARY OSTEOARTHRITIS, UNSPECIFIED SITE: ICD-10-CM

## 2018-09-17 PROCEDURE — 99213 OFFICE O/P EST LOW 20 MIN: CPT | Performed by: INTERNAL MEDICINE

## 2018-09-17 PROCEDURE — 99212 OFFICE O/P EST SF 10 MIN: CPT | Performed by: INTERNAL MEDICINE

## 2018-10-08 ENCOUNTER — IMMUNIZATION (OUTPATIENT)
Dept: FAMILY MEDICINE CLINIC | Facility: CLINIC | Age: 58
End: 2018-10-08
Payer: COMMERCIAL

## 2018-10-08 DIAGNOSIS — Z23 NEED FOR VACCINATION: ICD-10-CM

## 2018-10-08 PROCEDURE — 90686 IIV4 VACC NO PRSV 0.5 ML IM: CPT | Performed by: FAMILY MEDICINE

## 2018-10-08 PROCEDURE — 90471 IMMUNIZATION ADMIN: CPT | Performed by: FAMILY MEDICINE

## 2018-10-23 RX ORDER — LORATADINE AND PSEUDOEPHEDRINE SULFATE 5; 120 MG/1; MG/1
TABLET, EXTENDED RELEASE ORAL
Qty: 60 TABLET | Refills: 3 | Status: SHIPPED | OUTPATIENT
Start: 2018-10-23 | End: 2018-11-16

## 2018-10-24 RX ORDER — LORATADINE AND PSEUDOEPHEDRINE SULFATE 5; 120 MG/1; MG/1
TABLET, EXTENDED RELEASE ORAL
Qty: 60 TABLET | OUTPATIENT
Start: 2018-10-24

## 2018-10-24 NOTE — TELEPHONE ENCOUNTER
Refill was sent in on 10-23-18 to [facility] in [facility] with three refills.

## 2018-10-28 ENCOUNTER — APPOINTMENT (OUTPATIENT)
Dept: ULTRASOUND IMAGING | Facility: HOSPITAL | Age: 58
DRG: 176 | End: 2018-10-28
Attending: HOSPITALIST
Payer: COMMERCIAL

## 2018-10-28 ENCOUNTER — APPOINTMENT (OUTPATIENT)
Dept: CT IMAGING | Facility: HOSPITAL | Age: 58
DRG: 176 | End: 2018-10-28
Attending: EMERGENCY MEDICINE
Payer: COMMERCIAL

## 2018-10-28 ENCOUNTER — HOSPITAL ENCOUNTER (INPATIENT)
Facility: HOSPITAL | Age: 58
LOS: 2 days | Discharge: HOME | DRG: 176 | End: 2018-10-30
Attending: EMERGENCY MEDICINE | Admitting: HOSPITALIST
Payer: COMMERCIAL

## 2018-10-28 ENCOUNTER — APPOINTMENT (OUTPATIENT)
Dept: GENERAL RADIOLOGY | Facility: HOSPITAL | Age: 58
DRG: 176 | End: 2018-10-28
Attending: EMERGENCY MEDICINE
Payer: COMMERCIAL

## 2018-10-28 DIAGNOSIS — I26.99 OTHER ACUTE PULMONARY EMBOLISM WITHOUT ACUTE COR PULMONALE (HCC): Primary | ICD-10-CM

## 2018-10-28 PROCEDURE — 99223 1ST HOSP IP/OBS HIGH 75: CPT | Performed by: HOSPITALIST

## 2018-10-28 PROCEDURE — 71260 CT THORAX DX C+: CPT | Performed by: EMERGENCY MEDICINE

## 2018-10-28 PROCEDURE — 71046 X-RAY EXAM CHEST 2 VIEWS: CPT | Performed by: EMERGENCY MEDICINE

## 2018-10-28 RX ORDER — ACETAMINOPHEN 325 MG/1
650 TABLET ORAL EVERY 4 HOURS PRN
Status: DISCONTINUED | OUTPATIENT
Start: 2018-10-28 | End: 2018-10-30

## 2018-10-28 RX ORDER — ONDANSETRON 2 MG/ML
4 INJECTION INTRAMUSCULAR; INTRAVENOUS EVERY 6 HOURS PRN
Status: DISCONTINUED | OUTPATIENT
Start: 2018-10-28 | End: 2018-10-30

## 2018-10-28 RX ORDER — HEPARIN SODIUM 1000 [USP'U]/ML
80 INJECTION, SOLUTION INTRAVENOUS; SUBCUTANEOUS ONCE
Status: COMPLETED | OUTPATIENT
Start: 2018-10-28 | End: 2018-10-28

## 2018-10-28 RX ORDER — HEPARIN SODIUM AND DEXTROSE 10000; 5 [USP'U]/100ML; G/100ML
18 INJECTION INTRAVENOUS ONCE
Status: DISCONTINUED | OUTPATIENT
Start: 2018-10-28 | End: 2018-10-28

## 2018-10-28 RX ORDER — MONTELUKAST SODIUM 10 MG/1
10 TABLET ORAL NIGHTLY
Status: DISCONTINUED | OUTPATIENT
Start: 2018-10-28 | End: 2018-10-30

## 2018-10-28 RX ORDER — BISACODYL 10 MG
10 SUPPOSITORY, RECTAL RECTAL
Status: DISCONTINUED | OUTPATIENT
Start: 2018-10-28 | End: 2018-10-30

## 2018-10-28 RX ORDER — HEPARIN SODIUM AND DEXTROSE 10000; 5 [USP'U]/100ML; G/100ML
INJECTION INTRAVENOUS CONTINUOUS
Status: DISCONTINUED | OUTPATIENT
Start: 2018-10-28 | End: 2018-10-28

## 2018-10-28 RX ORDER — HYDROCODONE BITARTRATE AND ACETAMINOPHEN 5; 325 MG/1; MG/1
2 TABLET ORAL EVERY 4 HOURS PRN
Status: DISCONTINUED | OUTPATIENT
Start: 2018-10-28 | End: 2018-10-30

## 2018-10-28 RX ORDER — METOCLOPRAMIDE HYDROCHLORIDE 5 MG/ML
10 INJECTION INTRAMUSCULAR; INTRAVENOUS EVERY 8 HOURS PRN
Status: DISCONTINUED | OUTPATIENT
Start: 2018-10-28 | End: 2018-10-30

## 2018-10-28 RX ORDER — SODIUM PHOSPHATE, DIBASIC AND SODIUM PHOSPHATE, MONOBASIC 7; 19 G/133ML; G/133ML
1 ENEMA RECTAL ONCE AS NEEDED
Status: DISCONTINUED | OUTPATIENT
Start: 2018-10-28 | End: 2018-10-30

## 2018-10-28 RX ORDER — IPRATROPIUM BROMIDE AND ALBUTEROL SULFATE 2.5; .5 MG/3ML; MG/3ML
3 SOLUTION RESPIRATORY (INHALATION) ONCE
Status: COMPLETED | OUTPATIENT
Start: 2018-10-28 | End: 2018-10-28

## 2018-10-28 RX ORDER — POTASSIUM CHLORIDE 20 MEQ/1
40 TABLET, EXTENDED RELEASE ORAL EVERY 4 HOURS
Status: COMPLETED | OUTPATIENT
Start: 2018-10-28 | End: 2018-10-28

## 2018-10-28 RX ORDER — HYDROCODONE BITARTRATE AND ACETAMINOPHEN 5; 325 MG/1; MG/1
1 TABLET ORAL EVERY 4 HOURS PRN
Status: DISCONTINUED | OUTPATIENT
Start: 2018-10-28 | End: 2018-10-30

## 2018-10-28 RX ORDER — NITROGLYCERIN 0.4 MG/1
0.4 TABLET SUBLINGUAL EVERY 5 MIN PRN
Status: DISCONTINUED | OUTPATIENT
Start: 2018-10-28 | End: 2018-10-30

## 2018-10-28 RX ORDER — SODIUM CHLORIDE 0.9 % (FLUSH) 0.9 %
3 SYRINGE (ML) INJECTION AS NEEDED
Status: DISCONTINUED | OUTPATIENT
Start: 2018-10-28 | End: 2018-10-30

## 2018-10-28 RX ORDER — HEPARIN SODIUM 1000 [USP'U]/ML
80 INJECTION, SOLUTION INTRAVENOUS; SUBCUTANEOUS ONCE
Status: DISCONTINUED | OUTPATIENT
Start: 2018-10-28 | End: 2018-10-28

## 2018-10-28 RX ORDER — POLYETHYLENE GLYCOL 3350 17 G/17G
17 POWDER, FOR SOLUTION ORAL DAILY PRN
Status: DISCONTINUED | OUTPATIENT
Start: 2018-10-28 | End: 2018-10-30

## 2018-10-28 RX ORDER — DOCUSATE SODIUM 100 MG/1
100 CAPSULE, LIQUID FILLED ORAL 2 TIMES DAILY
Status: DISCONTINUED | OUTPATIENT
Start: 2018-10-28 | End: 2018-10-30

## 2018-10-28 NOTE — ED NOTES
Report given to the floor RN  Peyton Loya. Pt stable for transfer, heparin gtt infusing.
Trop 0.07 dr Maxwell Go made aware.
57.6

## 2018-10-28 NOTE — ED PROVIDER NOTES
Patient Seen in: [facility] AND [facility] Emergency Department    History   Patient presents with:  Dyspnea KEN SOB (respiratory)    Stated Complaint: sob with a little chest pain     HPI    60year old female complains of episode of severe chest pain a few days Diabetes Mother    • Cancer Neg    • Heart Disease Neg         CAD       Social History    Tobacco Use      Smoking status: Never Smoker      Smokeless tobacco: Never Used    Alcohol use: No    Drug use: No      Review of Systems    Positive for stated com There is no tenderness. There is no rebound. Musculoskeletal: Normal range of motion. She exhibits no edema. Neurological: She is alert and oriented to person, place, and time. No cranial nerve deficit. She exhibits normal muscle tone.  She displays no EKG    Rate, intervals and axes as noted on EKG Report.   Rate: 86  Rhythm: Sinus Rhythm  Reading: Incomplete right bundle-branch block                Imaging Results Available and Reviewed while here: XR CHEST PA + LAT CHEST (CPT=71046)   Final Result I discussed these results, their acute management issues, and the need for follow-up, with the patient/epifanio. See radiology reports for details.         10/28/18  1400 10/28/18  1403   BP: (!) 132/94    Pulse: 81    Resp: 20    Temp:  98.2 °F (36.8 °C)

## 2018-10-28 NOTE — PLAN OF CARE
Patient Centered Care    • Patient preferences are identified and integrated in the patient's plan of care Progressing        Patient/Family Goals    • Patient/Family Long Term Goal Progressing    • Patient/Family Short Term Goal Progressing        Vss.  To

## 2018-10-28 NOTE — ED INITIAL ASSESSMENT (HPI)
Worsening shortness of breath/cough since Thursday. Patient gets easily fatigued.     Also felt some chest pain Thursday

## 2018-10-29 ENCOUNTER — APPOINTMENT (OUTPATIENT)
Dept: ULTRASOUND IMAGING | Facility: HOSPITAL | Age: 58
DRG: 176 | End: 2018-10-29
Attending: HOSPITALIST
Payer: COMMERCIAL

## 2018-10-29 ENCOUNTER — APPOINTMENT (OUTPATIENT)
Dept: CV DIAGNOSTICS | Facility: HOSPITAL | Age: 58
DRG: 176 | End: 2018-10-29
Attending: HOSPITALIST
Payer: COMMERCIAL

## 2018-10-29 PROCEDURE — 93306 TTE W/DOPPLER COMPLETE: CPT | Performed by: HOSPITALIST

## 2018-10-29 PROCEDURE — 93970 EXTREMITY STUDY: CPT | Performed by: HOSPITALIST

## 2018-10-29 PROCEDURE — 99233 SBSQ HOSP IP/OBS HIGH 50: CPT | Performed by: HOSPITALIST

## 2018-10-29 RX ORDER — CODEINE PHOSPHATE AND GUAIFENESIN 10; 100 MG/5ML; MG/5ML
10 SOLUTION ORAL EVERY 6 HOURS PRN
Status: DISCONTINUED | OUTPATIENT
Start: 2018-10-29 | End: 2018-10-30

## 2018-10-29 NOTE — H&P
[id number] [facility]  Patient Status:  Inpatient    3/10/1960  60year old [facility] [id number]   Location 320/320-A Attending Shira Soriano MD     PCP Lisa Comment.  DO Jennifer     ASSESSMENT/PLAN    Acute pulmon state    • Arthritis    • Depression    • Disorder of thyroid    • Esophageal reflux    • Fibromyalgia    • Hearing impairment     left ear   • High cholesterol    • Hyperlipidemia    • Hyperthyroidism    • Pneumonia due to organism    • Visual impairment Not on file      Years of education: Not on file      Highest education level: Not on file    Social Needs      Financial resource strain: Not on file      Food insecurity - worry: Not on file      Food insecurity - inability: Not on file      Transportati wounds  Skin: Neg for rash. Neuro: Neg for dizziness, focal weakness, LH, HA. Psych: Neg for suicidal ideas, confusion, agitation and depressed mood.    Other: All other review systems negative except what is mentioned in the HPI    PHYSICAL EXAM  Vital

## 2018-10-29 NOTE — PROGRESS NOTES
[facility]    Progress Note    July Bailey Patient Status:  Inpatient    3/10/1960 MRN [id number]   Location [facility] 3W/SW Attending Violette Khan MD   Hosp Day # 1 PCP Ricardo Martin.  DO Jennifer        Subjective:     Res 10/29/2018     10/29/2018    K 4.1 10/29/2018    K 4.1 10/29/2018     10/29/2018    CO2 23 10/29/2018     (H) 10/29/2018    CA 9.4 10/29/2018    ALB 4.2 04/16/2018    ALKPHO 54 04/16/2018    BILT 0.5 04/16/2018    TP 7.5 04/16/2018    AS has DIAZ and mild c hest discomfort while walking.   > Cont DOAC.   > Will need 6 months of treatment for unprovoked PE. US neg for DVT.   > f/U outpt with [facility] d/w Dr. Fredy Holloway.   > FU D Dimer outpt to monitor.      Adeel Howell MD

## 2018-10-29 NOTE — CM/SW NOTE
Patient's oxygenation level at rest on room air is 97%. Patients oxygenation level while ambulating on room air is 91%. Thank you.

## 2018-10-30 VITALS
RESPIRATION RATE: 18 BRPM | HEIGHT: 62 IN | OXYGEN SATURATION: 95 % | SYSTOLIC BLOOD PRESSURE: 150 MMHG | WEIGHT: 195.38 LBS | HEART RATE: 80 BPM | TEMPERATURE: 98 F | BODY MASS INDEX: 35.95 KG/M2 | DIASTOLIC BLOOD PRESSURE: 73 MMHG

## 2018-10-30 PROCEDURE — 99239 HOSP IP/OBS DSCHRG MGMT >30: CPT | Performed by: HOSPITALIST

## 2018-10-30 NOTE — DISCHARGE SUMMARY
[facility]    Discharge Summary    Laurel High Patient Status:  Inpatient    3/10/1960 MRN [id number]   Location [facility] 3W/SW Attending Cony Rosales MD   Hosp Day # 2 PCP Naga Rodriguez DO     Date of Admission: 1 to f/u. - Heparin drip, stopped. Started on eliquis. - SPo2 > 92% at rest and ambulation. - telemtetry NSR  - Dr Don Jones to follow with patient outpatient in 2-3 weeks. Baseline D. Dimer checked.    - education patient on signs and symptoms of bleeding    week.    Specialty:  Family Medicine  Contact information:  [address]             Anna Harry MD In 2 weeks.     Specialties:  Hematology and Oncology, ONCOLOGY, HEMATOLOGY  Contact information:  [address]

## 2018-10-30 NOTE — PROGRESS NOTES
3 [facility]. Lizet Mahmood. [id number]  ?  10/30/18  ? Re: Adriana Obregon  ? To Whom It May Concern:    Adriana Obregon was admitted to [facility] AND [facility] from 10/28/2018 to 10/30/18.     Please excuse Adriana Obregon from attendin

## 2018-10-30 NOTE — PAYOR COMM NOTE
--------------REQUESTING 2 ADDITIONAL DAYS    ADMISSION REVIEW     Payor: Nilsa Jeffery #:  [id number]  Authorization Number: [id number]    Admit date: 10/28/18  Admit time: 7824   DICHARGED 10/30      Admitting Physician: Mimi Moffett MD Fluticasone Propionate 50 MCG/ACT Nasal Suspension,  1 spray by Nasal route 2 (two) times daily. Nutritional Supplements (ESTROVEN OR),  Take by mouth as needed. Nortriptyline HCl 10 MG Oral Cap,  Take 1-2 PO Q HS.    Meloxicam 15 MG Oral Tab,  Take Head: Normocephalic and atraumatic. Head is without raccoon's eyes, without right periorbital erythema and without left periorbital erythema. Nose: Nose normal.   Mouth/Throat: Mucous membranes are normal. Mucous membranes are not pale and not cyanotic. All other components within normal limits   PROTHROMBIN TIME (PT) - Normal   PTT, ACTIVATED - Normal   BNP (B TYPE NATRIUERTIC PEPTIDE) - Normal   CBC WITH DIFFERENTIAL WITH PLATELET    Narrative:      The following orders were created for panel order CBC OTHER: Post cholecystectomy clips         =====    CONCLUSION:     1.  No acute cardiopulmonary disease                        Dictated by (CST): Paolo Mullins MD on 10/28/2018 at 14:47         Approved by (CST): Paolo Mullins MD on 10/28/2018 at 14 Other acute pulmonary embolism without acute cor pulmonale (HCC)  (primary encounter diagnosis)    Disposition:  Admit    Follow-up:  No follow-up provider specified.     Medications Prescribed:  Current Discharge Medication List        Present on Admission -Lower extremity Dopplers    Other problems  Obesity with a BMI of 36  Depression  Obstructive sleep apnea  Osteoarthritis  Fibromyalgia  Thyroid nodule, outpatient workup  Hypertriglyceridemia    DVT prophylaxis: Eliquis  CODE STATUS is full code  19 Bobbi Cabrera MONTELUKAST SODIUM 10 MG Oral Tab  TAKE 1 TABLET (10 MG TOTAL) BY MOUTH NIGHTLY.   Qty: 30 tablet Refills: 0    OMEPRAZOLE 40 MG Oral Capsule Delayed Release  TOME IGNNY CAPSULA POR VIA ORAL TODOS LOS FOSTER  Qty: 90 capsule Refills: 1    ergocalciferol 53755 u Special Diet: Not Asked        Back Care: Not Asked        Exercise: Not Asked        Bike Helmet: Not Asked        Seat Belt: Not Asked        Self-Exams: Not Asked    Social History Narrative      Not on file      FAMILY HISTORY  Family History   P Neuro: Normal reflexes, cranial nerves II through XII intact, strength is symmetric and 5 out of 5 throughout, sensory exam grossly intact, coordination and gait normal.  Skin: Skin is warm and dry. No rashes, erythema, diaphoresis.    MS: No open wounds, n Discharged on 10/30/2018    10/29/2018 2047 Given 10 mg Oral Aleja Singh RN      Normal Saline Flush 0.9 % injection 3 mL     Date Action Dose Route User    Discharged on 10/30/2018    10/29/2018 2047 Given 3 mL Intravenous Kaila Flores RN - echo, pending  - Heparin drip, stopped  - walking O2 stats, pt still SOB  - telemtetry  - Eliquis started 10/28/18  - Dr Karina Rodriguez to follow with patient outpatient in 2-3 weeks  - education patient on signs and symptoms of bleeding     Obesity  - BMI 36  - e CONCLUSION:  1. Multiple small nonobstructing pulmonary emboli bilaterally. No focal mass, consolidation, effusion or pulmonary infarction. 2. No abnormal mediastinal or hilar mass/adenopathy.  Lung findings consistent with air trapping and small airways di Other acute pulmonary embolism without acute cor pulmonale (HCC)  Active Problems:    Pulmonary embolus (HCC)        Greater than 35 minutes spent on discharge     Hospital Course:   Reason for Admission:    This is a 60year oldfemale without history of - Keep log at home and take to PCP visit. - May just be white coat HTN.                         Discharge Plan:   Discharge Condition: Good     Current Discharge Medication List     New Orders     !! apixaban 5 MG Oral Tab  Take 2 tablets (10 mg total) by FU with Dr. Yajaira Farfan (blood doctor) in 2 weeks  Take eliquis blood thinner. Do not take ASA or NSAID medication.    Rpt ECHO in 3 months         Niya Rios  10/30/2018            Signed by Lucio Turner MD on 10/30/2018 12:45 PM    Discharge Instructions

## 2018-10-30 NOTE — PLAN OF CARE
CARDIOVASCULAR - ADULT    • Maintains optimal cardiac output and hemodynamic stability Progressing        HEMATOLOGIC - ADULT    • Free from bleeding injury Progressing        PAIN - ADULT    • Verbalizes/displays adequate comfort level or patient's stated

## 2018-10-30 NOTE — PROGRESS NOTES
[address] [place]mood. [address]  ?  10/30/18  ? Re: Adriana Obregon  ? To Whom It May Concern:    Adriana Obregon was admitted to [facility] from 10/28/2018 to 10/30/18.     Please excuse Adriana Obregon from attendin

## 2018-10-31 ENCOUNTER — PATIENT OUTREACH (OUTPATIENT)
Dept: CASE MANAGEMENT | Age: 58
End: 2018-10-31

## 2018-10-31 DIAGNOSIS — I26.99 OTHER ACUTE PULMONARY EMBOLISM WITHOUT ACUTE COR PULMONALE (HCC): ICD-10-CM

## 2018-10-31 DIAGNOSIS — Z02.9 ENCOUNTERS FOR ADMINISTRATIVE PURPOSES: ICD-10-CM

## 2018-10-31 PROCEDURE — 1111F DSCHRG MED/CURRENT MED MERGE: CPT

## 2018-10-31 NOTE — PROGRESS NOTES
Initial Post Discharge Follow Up   Discharge Date: 10/30/18  Contact Date: 10/31/2018    Consent Verification:  Assessment Completed With: Patient  HIPAA Verified? Yes    Discharge Dx:    Other acute pulmonary embolism without acute cor pulmonale ([facility] capsule (50,000 Units total) by mouth once a week. Disp: 12 capsule Rfl: 3   Fluticasone Propionate 50 MCG/ACT Nasal Suspension 1 spray by Nasal route 2 (two) times daily.  Disp: 1 Bottle Rfl: 3   Nutritional Supplements (ESTROVEN OR) Take by mouth as neede Up Visit with Carrie Soto, [address] [facility], [address] (Janice)        [facility], [address], [address]

## 2018-11-05 ENCOUNTER — TELEPHONE (OUTPATIENT)
Dept: FAMILY MEDICINE CLINIC | Facility: CLINIC | Age: 58
End: 2018-11-05

## 2018-11-05 ENCOUNTER — OFFICE VISIT (OUTPATIENT)
Dept: FAMILY MEDICINE CLINIC | Facility: CLINIC | Age: 58
End: 2018-11-05
Payer: COMMERCIAL

## 2018-11-05 VITALS
HEIGHT: 62 IN | DIASTOLIC BLOOD PRESSURE: 65 MMHG | SYSTOLIC BLOOD PRESSURE: 112 MMHG | BODY MASS INDEX: 37.17 KG/M2 | HEART RATE: 78 BPM | WEIGHT: 202 LBS

## 2018-11-05 DIAGNOSIS — R53.82 CHRONIC FATIGUE: ICD-10-CM

## 2018-11-05 DIAGNOSIS — Z12.4 PAP SMEAR FOR CERVICAL CANCER SCREENING: ICD-10-CM

## 2018-11-05 DIAGNOSIS — E66.09 CLASS 2 OBESITY DUE TO EXCESS CALORIES WITHOUT SERIOUS COMORBIDITY WITH BODY MASS INDEX (BMI) OF 36.0 TO 36.9 IN ADULT: ICD-10-CM

## 2018-11-05 DIAGNOSIS — E78.00 HIGH CHOLESTEROL: ICD-10-CM

## 2018-11-05 DIAGNOSIS — E78.1 HYPERTRIGLYCERIDEMIA: ICD-10-CM

## 2018-11-05 DIAGNOSIS — G47.33 SEVERE OBSTRUCTIVE SLEEP APNEA: Primary | ICD-10-CM

## 2018-11-05 DIAGNOSIS — I26.99 OTHER ACUTE PULMONARY EMBOLISM WITHOUT ACUTE COR PULMONALE (HCC): ICD-10-CM

## 2018-11-05 DIAGNOSIS — Z12.31 VISIT FOR SCREENING MAMMOGRAM: ICD-10-CM

## 2018-11-05 PROCEDURE — 99495 TRANSJ CARE MGMT MOD F2F 14D: CPT | Performed by: FAMILY MEDICINE

## 2018-11-05 NOTE — TELEPHONE ENCOUNTER
Pt signed HIPAA and FCR in [facility] office. Pt will contact her HR to see if she can get an accomm form faxed to Forms dept. If a forms comes, then we will bill her. Pt agrees. Faxed off work letter to her HR and gave conf and letter to pt.

## 2018-11-05 NOTE — PROGRESS NOTES
Date of Admission: 10/28/2018   Date of Discharge: 10/30/2018     Admitting Diagnosis: Other acute pulmonary embolism without acute cor pulmonale (HCC) [I26.99]     Disposition: Home     Discharge Diagnosis: . Principal Problem:    Other acute pulmonary emb moderate    Patient seen within 7 days from date of discharge. Now still some sob and fatigue. No chest pain  No n/v  Was worse on Suday (yesterday.)  Wants to go back to work tomorrow.   I    Patient's past medical surgical family social history was cholesterol  Will recheck    3. Chronic fatigue  Worse after p.e.    4. Class 2 obesity due to excess calories without serious comorbidity with body mass index (BMI) of 36.0 to 36.9 in adult  Discussed weight loss    5. Hypertriglyceridemia  recheck    6.

## 2018-11-06 ENCOUNTER — OFFICE VISIT (OUTPATIENT)
Dept: SLEEP CENTER | Age: 58
End: 2018-11-06
Attending: FAMILY MEDICINE
Payer: COMMERCIAL

## 2018-11-06 DIAGNOSIS — Z76.89 SLEEP CONCERN: Primary | ICD-10-CM

## 2018-11-06 PROCEDURE — 95811 POLYSOM 6/>YRS CPAP 4/> PARM: CPT

## 2018-11-08 ENCOUNTER — HOSPITAL ENCOUNTER (OUTPATIENT)
Dept: MAMMOGRAPHY | Age: 58
Discharge: HOME | End: 2018-11-08
Attending: FAMILY MEDICINE
Payer: COMMERCIAL

## 2018-11-08 DIAGNOSIS — Z12.31 VISIT FOR SCREENING MAMMOGRAM: ICD-10-CM

## 2018-11-08 PROCEDURE — 77063 BREAST TOMOSYNTHESIS BI: CPT | Performed by: FAMILY MEDICINE

## 2018-11-08 PROCEDURE — 77067 SCR MAMMO BI INCL CAD: CPT | Performed by: FAMILY MEDICINE

## 2018-11-08 NOTE — TELEPHONE ENCOUNTER
Dr. Dov Williamson,    Pt brought a FMLA into the office. She is requesting light duty of no lifting more than 10 pounds starting from 11/12/18 - 1/12/1/19 in addition to the restrictions you placed in the letter with her hours. Do you approve? Please advise.

## 2018-11-09 NOTE — PROCEDURES
[address] [facility]  Accredited by the [employer] (AASM)    PATIENT'S NAME: Johnnie Salas   ATTENDING PHYSICIAN: Dianelys Rodriguez DO   REFERRING PHYSICIAN:    PATIENT ACCOUNT #: [de-identified] LOCATION: Sleep Center   MEDIC per hour for a combined arousal index of 17.6 events per hour. There were no significant periodic limb movements. The lowest desaturation was to 89%.   The average heart rate is 62 beats per minute, and there was no significant bradycardia, asystole, tach

## 2018-11-09 NOTE — TELEPHONE ENCOUNTER
Faxed signed FMLA to Pt's [address]. Pt will  at [facility] on Monday.

## 2018-11-12 ENCOUNTER — HOSPITAL ENCOUNTER (OUTPATIENT)
Dept: ULTRASOUND IMAGING | Age: 58
Discharge: HOME | End: 2018-11-12
Attending: INTERNAL MEDICINE
Payer: COMMERCIAL

## 2018-11-12 DIAGNOSIS — E04.1 THYROID NODULE: ICD-10-CM

## 2018-11-12 PROCEDURE — 76536 US EXAM OF HEAD AND NECK: CPT | Performed by: INTERNAL MEDICINE

## 2018-11-12 RX ORDER — OMEPRAZOLE 40 MG/1
CAPSULE, DELAYED RELEASE ORAL
Qty: 90 CAPSULE | Refills: 1 | Status: SHIPPED | OUTPATIENT
Start: 2018-11-12 | End: 2019-05-04

## 2018-11-12 NOTE — TELEPHONE ENCOUNTER
LOV: 9/17/18  Future Appointments   Date Time Provider Robert Zepedai   11/16/2018  9:00 AM Anna Harry MD [address] [place] [address] HEM ONC EMO   11/26/2018 10:45 AM Serafin Howell MD [facility] [place] [facility]   3/18/2019 11:00 AM Beatriz Mackay MD [address] [place] [address]

## 2018-11-12 NOTE — TELEPHONE ENCOUNTER
Pt picked up FMLA form at [facility] and dropped off STD if needed.

## 2018-11-14 ENCOUNTER — TELEPHONE (OUTPATIENT)
Dept: INTERNAL MEDICINE CLINIC | Facility: CLINIC | Age: 58
End: 2018-11-14

## 2018-11-14 NOTE — TELEPHONE ENCOUNTER
Short term disability forms came in via fax for PT. Forms were scanned to [employer] and originals were put in [employer] folder at [facility].  No Hippa signed nor payment taken

## 2018-11-14 NOTE — TELEPHONE ENCOUNTER
,    Pt was sent home from work on 11/12/18 b/c they couldn't allow her to work with the light duty/hours restrictions. Please sign off on form:  -Highlight the patient and hit \"Chart\" button.   -In Chart Review, w/in the Encounter tab - cli

## 2018-11-15 ENCOUNTER — NURSE TRIAGE (OUTPATIENT)
Dept: FAMILY MEDICINE CLINIC | Facility: CLINIC | Age: 58
End: 2018-11-15

## 2018-11-15 NOTE — TELEPHONE ENCOUNTER
DR CMC: please sign off pended rx. I called it into pharmacy. Message noted. Patient notified of MD's recommendation. Patient verbalized understanding. RX will be called in.  Spoke to Ehsan, pharmacist, [facility].

## 2018-11-15 NOTE — TELEPHONE ENCOUNTER
Action Requested: Summary for Provider     []  Critical Lab, Recommendations Needed  [] Need Additional Advice  []   FYI    []   Need Orders  [x] Need Medications Sent to Pharmacy  []  Other     SUMMARY: With Pakistani interpretor, cough with clear phlegm, h

## 2018-11-15 NOTE — TELEPHONE ENCOUNTER
Faxed disability form to [facility]. PSR will call pt to  and pay for form at [facility].

## 2018-11-16 ENCOUNTER — OFFICE VISIT (OUTPATIENT)
Dept: HEMATOLOGY/ONCOLOGY | Facility: HOSPITAL | Age: 58
End: 2018-11-16
Attending: INTERNAL MEDICINE
Payer: COMMERCIAL

## 2018-11-16 VITALS
SYSTOLIC BLOOD PRESSURE: 125 MMHG | TEMPERATURE: 98 F | BODY MASS INDEX: 37.17 KG/M2 | HEIGHT: 62 IN | WEIGHT: 202 LBS | DIASTOLIC BLOOD PRESSURE: 59 MMHG | HEART RATE: 80 BPM | RESPIRATION RATE: 16 BRPM

## 2018-11-16 DIAGNOSIS — I26.99 OTHER PULMONARY EMBOLISM WITHOUT ACUTE COR PULMONALE, UNSPECIFIED CHRONICITY (HCC): Primary | ICD-10-CM

## 2018-11-16 PROCEDURE — 99244 OFF/OP CNSLTJ NEW/EST MOD 40: CPT | Performed by: INTERNAL MEDICINE

## 2018-11-16 RX ORDER — CODEINE PHOSPHATE AND GUAIFENESIN 10; 100 MG/5ML; MG/5ML
SOLUTION ORAL
Qty: 200 ML | Refills: 0 | OUTPATIENT
Start: 2018-11-16 | End: 2019-02-16 | Stop reason: ALTCHOICE

## 2018-11-16 RX ORDER — CODEINE PHOSPHATE AND GUAIFENESIN 10; 100 MG/5ML; MG/5ML
5 SOLUTION ORAL EVERY 6 HOURS PRN
COMMUNITY
End: 2018-11-21

## 2018-11-16 NOTE — PROGRESS NOTES
HPI     Tony Pruett is a 60year old female here for evaluation of a Other pulmonary embolism without acute cor pulmonale, unspecified chronicity (hcc)  (primary encounter diagnosis)   First episode of thrombosis at 60years old.   Location  Bilateral Gastrointestinal: Negative for abdominal pain, blood in stool, constipation and diarrhea. Endocrine:        Hot flashes. Genitourinary: Negative for hematuria. Musculoskeletal: Positive for arthralgias, back pain and neck pain.         Zachd follows w evening Disp:  Rfl:      Allergies:   No Known Allergies    Past Medical History:   Diagnosis Date   • Anxiety state    • Arthritis    • Depression    • Disorder of thyroid    • Esophageal reflux    • Fibromyalgia    • Hearing impairment     left ear   • H Father         hyperlipidemia   • Stroke Mother         CVA   • Diabetes Mother    • Cancer Neg    • Heart Disease Neg         CAD         PHYSICAL EXAM:    /59 (BP Location: Left arm, Patient Position: Sitting, Cuff Size: adult)   Pulse 80   Temp 98 with OCP/HRT or pregnancy, pregnancy loss in 2nd or 3rd trimester.     Not recommended in patients > 49 y/o with first spontaneous VTE, patiens with active cancer, elderly patients with postoperative VTE, renal vein thrombosis, arterial thrombosis (save for duties without any restrictions. Letter was provided to the patient related to returning to work. The patient was very concerned about losing her job and is agreeable that she would like to return to work as soon as possible.       No orders of the define 14:30.     INDICATIONS:  Worsening shortness of breath/cough since Thursday. Patient gets easily fatigued. Also felt some chest pain Thursday. History of cholesterolemia, GERD hyperlipidemia and hypothyroidism. Elevated troponin.  Elevated d-dimer     [facility] Approved by (CST): Joie Alvarez MD on 10/28/2018 at 17:08

## 2018-11-21 ENCOUNTER — OFFICE VISIT (OUTPATIENT)
Dept: FAMILY MEDICINE CLINIC | Facility: CLINIC | Age: 58
End: 2018-11-21
Payer: COMMERCIAL

## 2018-11-21 VITALS
HEIGHT: 62 IN | HEART RATE: 71 BPM | TEMPERATURE: 98 F | DIASTOLIC BLOOD PRESSURE: 75 MMHG | SYSTOLIC BLOOD PRESSURE: 111 MMHG | WEIGHT: 202 LBS | BODY MASS INDEX: 37.17 KG/M2

## 2018-11-21 DIAGNOSIS — R21 RASH: ICD-10-CM

## 2018-11-21 DIAGNOSIS — H93.19 TINNITUS, UNSPECIFIED LATERALITY: Primary | ICD-10-CM

## 2018-11-21 DIAGNOSIS — J06.9 UPPER RESPIRATORY INFECTION, ACUTE: ICD-10-CM

## 2018-11-21 PROCEDURE — 99214 OFFICE O/P EST MOD 30 MIN: CPT | Performed by: FAMILY MEDICINE

## 2018-11-21 RX ORDER — CLOTRIMAZOLE AND BETAMETHASONE DIPROPIONATE 10; .64 MG/G; MG/G
CREAM TOPICAL
Qty: 60 G | Refills: 3 | Status: SHIPPED | OUTPATIENT
Start: 2018-11-21 | End: 2020-06-13

## 2018-11-21 RX ORDER — BENZONATATE 100 MG/1
100 CAPSULE ORAL 3 TIMES DAILY PRN
Qty: 20 CAPSULE | Refills: 0 | Status: SHIPPED | OUTPATIENT
Start: 2018-11-21 | End: 2019-02-16 | Stop reason: ALTCHOICE

## 2018-11-21 RX ORDER — AZITHROMYCIN 250 MG/1
TABLET, FILM COATED ORAL
Qty: 6 TABLET | Refills: 0 | Status: SHIPPED | OUTPATIENT
Start: 2018-11-21 | End: 2019-02-16 | Stop reason: ALTCHOICE

## 2018-11-21 NOTE — PROGRESS NOTES
HPI:    Patient ID: Trenda Curling is a 60year old female. Cough for more then 1 week. Phlegmy, greenish phlegm no improvement. Also nasal congestion and sinus pressure.    Tinnitus for more then a year   Rash underneath the right breast itchy        R (Patient taking differently: 10 mg nightly. Take 1-2 PO Q HS. ) Disp: 180 capsule Rfl: 3   Fenofibrate 160 MG Oral Tab Take  by mouth. take 1 tablet (160MG)  by oral route  every day Disp:  Rfl:    simvastatin (ZOCOR) 20 MG Oral Tab Take  by mouth.  take 1

## 2018-11-24 ENCOUNTER — TELEPHONE (OUTPATIENT)
Dept: OTHER | Age: 58
End: 2018-11-24

## 2018-11-24 NOTE — TELEPHONE ENCOUNTER
LMTCB  See DME referral.  Floor staff please fax referral with test results and face sheet to [facility] [phone]

## 2018-11-25 RX ORDER — FLUTICASONE PROPIONATE 50 MCG
SPRAY, SUSPENSION (ML) NASAL
Qty: 1 BOTTLE | Refills: 3 | Status: SHIPPED | OUTPATIENT
Start: 2018-11-25 | End: 2019-08-06

## 2018-11-26 ENCOUNTER — TELEPHONE (OUTPATIENT)
Dept: FAMILY MEDICINE CLINIC | Facility: CLINIC | Age: 58
End: 2018-11-26

## 2018-11-26 ENCOUNTER — OFFICE VISIT (OUTPATIENT)
Dept: ENDOCRINOLOGY CLINIC | Facility: CLINIC | Age: 58
End: 2018-11-26
Payer: COMMERCIAL

## 2018-11-26 VITALS
HEIGHT: 62 IN | DIASTOLIC BLOOD PRESSURE: 75 MMHG | SYSTOLIC BLOOD PRESSURE: 127 MMHG | BODY MASS INDEX: 37.17 KG/M2 | WEIGHT: 202 LBS | HEART RATE: 66 BPM

## 2018-11-26 PROCEDURE — 99213 OFFICE O/P EST LOW 20 MIN: CPT | Performed by: INTERNAL MEDICINE

## 2018-11-26 PROCEDURE — 99212 OFFICE O/P EST SF 10 MIN: CPT | Performed by: INTERNAL MEDICINE

## 2018-11-26 NOTE — TELEPHONE ENCOUNTER
Patient states she saw Dr. Kai Barreto on 11/21/18 for cough and her symptoms have improved but cough lingers. Patient states her cough improved slightly but is still not going away.  States she is almost done with cough medication and completed the Z radha alread

## 2018-11-26 NOTE — PROGRESS NOTES
Return Office Visit     CHIEF COMPLAINT:    Thyroid nodule     HISTORY OF PRESENT ILLNESS:  Juancarlos Rosales is a 60year old female who presents for follow up for for evaluation of thyroid nodule.      She was recently hospitalized for b/l PE.    Feels bett 1-2 PO Q HS. (Patient taking differently: 10 mg nightly. Take 1-2 PO Q HS. ) Disp: 180 capsule Rfl: 3   Fenofibrate 160 MG Oral Tab Take  by mouth.  take 1 tablet (160MG)  by oral route  every day Disp:  Rfl:    simvastatin (ZOCOR) 20 MG Oral Tab Take  by m auscultation bilaterally, no crackles or wheezing  CARDIOVASCULAR:  regular rate and rhythm, normal S1 and S2  ABDOMEN:  normal bowel sounds, soft, non-distended, non-tender  SKIN:  no bruising or bleeding, no rashes and no lesions  EXTREMITIES: normal pul

## 2018-12-06 NOTE — TELEPHONE ENCOUNTER
Patient has O health plan no referrals required.       Thank you, Tiago Reed Small-Referral Specialist.

## 2018-12-07 RX ORDER — APIXABAN 5 MG/1
TABLET, FILM COATED ORAL
Qty: 60 TABLET | Refills: 11 | Status: SHIPPED | OUTPATIENT
Start: 2018-12-07 | End: 2019-12-23

## 2018-12-07 NOTE — TELEPHONE ENCOUNTER
Requested Prescriptions     Pending Prescriptions Disp Refills   • ELIQUIS 5 MG Oral Tab [Pharmacy Med Name: ELIQUIS 5 MG TABLET] 60 tablet 0     Sig: MADAY Roe 45 Office Visit with PCP: 11/5/2018    Unable to fill per aaliyah

## 2018-12-11 ENCOUNTER — TELEPHONE (OUTPATIENT)
Dept: OTHER | Age: 58
End: 2018-12-11

## 2018-12-11 RX ORDER — BENZONATATE 100 MG/1
100 CAPSULE ORAL 3 TIMES DAILY PRN
Qty: 20 CAPSULE | Refills: 0 | OUTPATIENT
Start: 2018-12-11

## 2018-12-11 RX ORDER — CODEINE PHOSPHATE AND GUAIFENESIN 10; 100 MG/5ML; MG/5ML
SOLUTION ORAL
Qty: 200 ML | Refills: 0 | OUTPATIENT
Start: 2018-12-11

## 2018-12-11 RX ORDER — AZITHROMYCIN 250 MG/1
TABLET, FILM COATED ORAL
Qty: 6 TABLET | Refills: 0 | OUTPATIENT
Start: 2018-12-11

## 2018-12-11 NOTE — TELEPHONE ENCOUNTER
I see no call back from patient, so contacted patient and LMTCB with family member who refused to provide their name.

## 2018-12-11 NOTE — TELEPHONE ENCOUNTER
Patient calling back with  Heather Odonnell #[id number], advised Dr Nuvia Serrano note and verbalized understanding. States that she is feeling better now, no need to make a FU OV. Note      She needs to come for reevaluation if still sick.

## 2018-12-15 ENCOUNTER — OFFICE VISIT (OUTPATIENT)
Dept: OTOLARYNGOLOGY | Facility: CLINIC | Age: 58
End: 2018-12-15
Payer: COMMERCIAL

## 2018-12-15 ENCOUNTER — OFFICE VISIT (OUTPATIENT)
Dept: AUDIOLOGY | Facility: CLINIC | Age: 58
End: 2018-12-15
Payer: COMMERCIAL

## 2018-12-15 VITALS
BODY MASS INDEX: 37.17 KG/M2 | TEMPERATURE: 97 F | DIASTOLIC BLOOD PRESSURE: 80 MMHG | SYSTOLIC BLOOD PRESSURE: 121 MMHG | WEIGHT: 202 LBS | HEIGHT: 62 IN

## 2018-12-15 DIAGNOSIS — H93.11 TINNITUS AURIUM, RIGHT: Primary | ICD-10-CM

## 2018-12-15 DIAGNOSIS — H93.13 TINNITUS, BILATERAL: Primary | ICD-10-CM

## 2018-12-15 PROCEDURE — 99212 OFFICE O/P EST SF 10 MIN: CPT | Performed by: OTOLARYNGOLOGY

## 2018-12-15 PROCEDURE — 99214 OFFICE O/P EST MOD 30 MIN: CPT | Performed by: OTOLARYNGOLOGY

## 2018-12-15 PROCEDURE — 92557 COMPREHENSIVE HEARING TEST: CPT | Performed by: AUDIOLOGIST

## 2018-12-15 PROCEDURE — 92567 TYMPANOMETRY: CPT | Performed by: AUDIOLOGIST

## 2018-12-15 RX ORDER — MELOXICAM 15 MG/1
TABLET ORAL
Refills: 1 | COMMUNITY
Start: 2018-12-01 | End: 2019-02-16 | Stop reason: ALTCHOICE

## 2018-12-15 RX ORDER — OFLOXACIN 3 MG/ML
3 SOLUTION/ DROPS OPHTHALMIC 3 TIMES DAILY
Qty: 1 BOTTLE | Refills: 0 | Status: SHIPPED | OUTPATIENT
Start: 2018-12-15 | End: 2018-12-22

## 2018-12-15 RX ORDER — LORATADINE 10 MG/1
10 TABLET ORAL DAILY
Qty: 30 TABLET | Refills: 3 | Status: SHIPPED | OUTPATIENT
Start: 2018-12-15 | End: 2019-02-16

## 2018-12-15 RX ORDER — MONTELUKAST SODIUM 10 MG/1
10 TABLET ORAL NIGHTLY
Qty: 30 TABLET | Refills: 3 | Status: SHIPPED | OUTPATIENT
Start: 2018-12-15 | End: 2020-06-13

## 2018-12-15 NOTE — PROGRESS NOTES
Álvaro Lazar is a 60year old female.   Patient presents with:  Ringing In Ear: ringing of both ears for 5 years      HISTORY OF PRESENT ILLNESS  She presents with a history of snoring as noted by her . Renetta Jamie does complain of chronic fatigue throug Neurological Disorder Father         parkinson's disesae   • Arthritis Father    • Lipids Father         hyperlipidemia   • Stroke Mother         CVA   • Diabetes Mother    • Cancer Neg    • Heart Disease Neg         CAD       Past Medical History:   Diagn nerves II through XII grossly intact.    Head/Face Normal Facial features - Normal. Eyebrows - Normal. Skull - Normal.        Nasopharynx Normal External nose - Normal. Lips/teeth/gums - Normal. Tonsils - Normal. Oropharynx - Normal.   Ears Normal Inspectio mouth. take 1 tablet (160MG)  by oral route  every day, Disp: , Rfl:   •  simvastatin (ZOCOR) 20 MG Oral Tab, Take  by mouth.  take 1 tablet (20MG)  by oral route  every day in the evening, Disp: , Rfl:   •  Meloxicam 15 MG Oral Tab, TOME GINNY TABLETA POR V?

## 2018-12-15 NOTE — PROGRESS NOTES
AUDIOGRAM     Nile Magaña was referred for testing by No ref. provider found. 3/10/1960  [id number]      Otoscopic Inspection:  both ears: no cerumen    Audiometric Test Results: The patient was tested using air and bone audiometry.   The audiometric

## 2018-12-17 ENCOUNTER — TELEPHONE (OUTPATIENT)
Dept: OTOLARYNGOLOGY | Facility: CLINIC | Age: 58
End: 2018-12-17

## 2018-12-17 NOTE — TELEPHONE ENCOUNTER
Called pt to inform pt that no authorization is requiredr for MRI Brain/IACS ( W+WO) cpt [id number]. B-152, spoke to, [facility], reference number [id number]. Pt does need to contact [facility] for any out of pocket expense.

## 2018-12-29 ENCOUNTER — HOSPITAL ENCOUNTER (OUTPATIENT)
Dept: MRI IMAGING | Age: 58
Discharge: HOME | End: 2018-12-29
Attending: OTOLARYNGOLOGY
Payer: COMMERCIAL

## 2018-12-29 DIAGNOSIS — H93.11 TINNITUS AURIUM, RIGHT: ICD-10-CM

## 2018-12-29 PROCEDURE — A9575 INJ GADOTERATE MEGLUMI 0.1ML: HCPCS | Performed by: OTOLARYNGOLOGY

## 2018-12-29 PROCEDURE — 70553 MRI BRAIN STEM W/O & W/DYE: CPT | Performed by: OTOLARYNGOLOGY

## 2019-01-05 ENCOUNTER — OFFICE VISIT (OUTPATIENT)
Dept: OTOLARYNGOLOGY | Facility: CLINIC | Age: 59
End: 2019-01-05
Payer: COMMERCIAL

## 2019-01-05 VITALS
TEMPERATURE: 97 F | WEIGHT: 202 LBS | HEIGHT: 62 IN | SYSTOLIC BLOOD PRESSURE: 118 MMHG | BODY MASS INDEX: 37.17 KG/M2 | DIASTOLIC BLOOD PRESSURE: 74 MMHG

## 2019-01-05 DIAGNOSIS — H93.11 TINNITUS AURIUM, RIGHT: Primary | ICD-10-CM

## 2019-01-05 PROCEDURE — 99214 OFFICE O/P EST MOD 30 MIN: CPT | Performed by: OTOLARYNGOLOGY

## 2019-01-05 PROCEDURE — 99212 OFFICE O/P EST SF 10 MIN: CPT | Performed by: OTOLARYNGOLOGY

## 2019-01-05 NOTE — PROGRESS NOTES
Cora Rodrigez is a 60year old female. Patient presents with:   Follow - Up: MRI brain result done on  12/29/18      HISTORY OF PRESENT ILLNESS  She presents with a history of snoring as noted by her . Beba Hodgson does complain of chronic fatigue through file      Years of education: Not on file      Highest education level: Not on file    Tobacco Use      Smoking status: Never Smoker      Smokeless tobacco: Never Used    Substance and Sexual Activity      Alcohol use: No      Drug use: No    Other Topics Oriented to time, place, person & situation. Appropriate mood and affect.    Neck Exam Normal Inspection - Normal. Palpation - Normal. Parotid gland - Normal. Thyroid gland - Normal.   Eyes Normal Conjunctiva - Right: Normal, Left: Normal. Pupil - Right: No Disp: 200 mL, Rfl: 0  •  OMEPRAZOLE 40 MG Oral Capsule Delayed Release, TOME GINNY CAPSULA POR VIA ORAL TODOS LOS FOSTER, Disp: 90 capsule, Rfl: 1  •  Sertraline HCl 50 MG Oral Tab, Take 3 tablets (150 mg total) by mouth daily. , Disp: 270 tablet, Rfl: 3  •  MO

## 2019-01-10 ENCOUNTER — TELEPHONE (OUTPATIENT)
Dept: OTOLARYNGOLOGY | Facility: CLINIC | Age: 59
End: 2019-01-10

## 2019-01-10 RX ORDER — MONTELUKAST SODIUM 10 MG/1
10 TABLET ORAL NIGHTLY
Qty: 30 TABLET | Refills: 3 | Status: SHIPPED | OUTPATIENT
Start: 2019-01-10 | End: 2019-02-16

## 2019-01-10 NOTE — TELEPHONE ENCOUNTER
Per pt [facility] pharmacy has not received montelukast prescription, [facility] pharmacy has tried contacting office several times. Pls advise thank you.

## 2019-02-09 ENCOUNTER — NURSE TRIAGE (OUTPATIENT)
Dept: OTHER | Age: 59
End: 2019-02-09

## 2019-02-09 NOTE — TELEPHONE ENCOUNTER
Spoke with pt and her  ( Annamaria Robison) and was informed of MD recommendation, he stated pt is not really that sick, he is just hoping pt can get rx. He is not sure if pt can go to IC today due to its her grand daughter birthday party.   He said pt might g

## 2019-02-09 NOTE — TELEPHONE ENCOUNTER
Action Requested: Summary for Provider     []  Critical Lab, Recommendations Needed  [] Need Additional Advice  []   FYI    []   Need Orders  [x] Need Medications Sent to Pharmacy  []  Other     SUMMARY:  pt stated that she has been having a cough an

## 2019-02-10 ENCOUNTER — HOSPITAL ENCOUNTER (OUTPATIENT)
Age: 59
Discharge: HOME | End: 2019-02-10
Attending: EMERGENCY MEDICINE
Payer: COMMERCIAL

## 2019-02-10 VITALS
SYSTOLIC BLOOD PRESSURE: 146 MMHG | OXYGEN SATURATION: 95 % | TEMPERATURE: 98 F | WEIGHT: 204 LBS | RESPIRATION RATE: 16 BRPM | HEART RATE: 78 BPM | BODY MASS INDEX: 37 KG/M2 | DIASTOLIC BLOOD PRESSURE: 87 MMHG

## 2019-02-10 DIAGNOSIS — J01.40 ACUTE NON-RECURRENT PANSINUSITIS: Primary | ICD-10-CM

## 2019-02-10 LAB — S PYO AG THROAT QL: NEGATIVE

## 2019-02-10 PROCEDURE — 99214 OFFICE O/P EST MOD 30 MIN: CPT

## 2019-02-10 PROCEDURE — 87430 STREP A AG IA: CPT

## 2019-02-10 PROCEDURE — 99213 OFFICE O/P EST LOW 20 MIN: CPT

## 2019-02-10 RX ORDER — AMOXICILLIN 875 MG/1
875 TABLET, COATED ORAL 2 TIMES DAILY
Qty: 20 TABLET | Refills: 0 | Status: SHIPPED | OUTPATIENT
Start: 2019-02-10 | End: 2019-02-16

## 2019-02-10 RX ORDER — BENZONATATE 100 MG/1
100 CAPSULE ORAL 3 TIMES DAILY PRN
Qty: 30 CAPSULE | Refills: 0 | Status: SHIPPED | OUTPATIENT
Start: 2019-02-10 | End: 2019-03-12

## 2019-02-10 NOTE — ED PROVIDER NOTES
Patient Seen in: [facility] In [address] [place] [address]    History   Patient presents with:  Cough/URI    Stated Complaint: cough,congestion,flu symptoms    HPI    The patient is a 65-year-old female with a past history of fibromyalgia, hyperlipide Normocephalic, no swelling or tenderness  Eyes: Nonicteric sclera, no conjunctival injection  ENT: TMs are clear and flat bilaterally. There is mild posterior pharyngeal erythema.   There is mild diffuse sinus tenderness to palpation  Chest: Clear to auscu

## 2019-02-16 ENCOUNTER — OFFICE VISIT (OUTPATIENT)
Dept: FAMILY MEDICINE CLINIC | Facility: CLINIC | Age: 59
End: 2019-02-16
Payer: COMMERCIAL

## 2019-02-16 ENCOUNTER — TELEPHONE (OUTPATIENT)
Dept: FAMILY MEDICINE CLINIC | Facility: CLINIC | Age: 59
End: 2019-02-16

## 2019-02-16 VITALS
HEART RATE: 78 BPM | BODY MASS INDEX: 39 KG/M2 | SYSTOLIC BLOOD PRESSURE: 129 MMHG | TEMPERATURE: 98 F | WEIGHT: 212 LBS | DIASTOLIC BLOOD PRESSURE: 63 MMHG

## 2019-02-16 DIAGNOSIS — J01.01 ACUTE RECURRENT MAXILLARY SINUSITIS: Primary | ICD-10-CM

## 2019-02-16 PROCEDURE — 99212 OFFICE O/P EST SF 10 MIN: CPT | Performed by: FAMILY MEDICINE

## 2019-02-16 PROCEDURE — 99213 OFFICE O/P EST LOW 20 MIN: CPT | Performed by: FAMILY MEDICINE

## 2019-02-16 RX ORDER — AZELASTINE 1 MG/ML
2 SPRAY, METERED NASAL 2 TIMES DAILY
Qty: 1 BOTTLE | Refills: 1 | Status: SHIPPED | OUTPATIENT
Start: 2019-02-16 | End: 2019-03-30

## 2019-02-16 RX ORDER — AMOXICILLIN AND CLAVULANATE POTASSIUM 875; 125 MG/1; MG/1
1 TABLET, FILM COATED ORAL 2 TIMES DAILY
Qty: 10 TABLET | Refills: 0 | Status: SHIPPED | OUTPATIENT
Start: 2019-02-16 | End: 2019-02-21

## 2019-02-16 NOTE — PROGRESS NOTES
Where: nasal congestion and cough  Quality (Constant/Sharp?): sharp  Severity: mild mod pain  Duration: 3 weeks  Timing: constant  When does it occur: day and night.   Modifying factors:   Associated signs symptoms: cough   Physical exam  The patient is wel

## 2019-02-18 NOTE — TELEPHONE ENCOUNTER
Status has been changed. [employer] does not lucas DME for Aleena Salts PPO plans. Order will have to be faxed to the vendor.      Thank you,  [facility]

## 2019-03-12 ENCOUNTER — TELEPHONE (OUTPATIENT)
Dept: FAMILY MEDICINE CLINIC | Facility: CLINIC | Age: 59
End: 2019-03-12

## 2019-03-12 NOTE — TELEPHONE ENCOUNTER
VERY disappointed. Please make patient aware she has severe, life threatening sleep apnea. No joke, people day every day of sleep apnea. People don't die peacefully in there sleep, they have been dying for 20 years of sleep apnea.   Would recommend a tri

## 2019-03-12 NOTE — TELEPHONE ENCOUNTER
Pt informed of [facility]'s message response below in Nicaraguan. Pt voiced understanding and states is willing to at least try it wearing the CPAP. Called [facility] and informed Laura that pt states is willing to get the CPAP.      BR[facility]

## 2019-03-12 NOTE — TELEPHONE ENCOUNTER
[facility]avi Manriquez called in stating that they received order for CPAP, however, Pt is declining the machine. They wanted to make sure Dr. [facility] is aware. Please advise.

## 2019-03-13 NOTE — PROGRESS NOTES
Tucker Luna is a 26-year-old woman with diffuse myofascial pain, depression, and osteoarthritis. Since I saw her September 17th of 2018, she has remained on sertraline 150 mg every morning. It is helping.   She has also been taking nortriptyline 10 mg at bedtime Sertraline 150 mg every morning, Eliquis working OK. 4. Left knee osteoarthritis. Much improved after working with physical therapy. Increase exercises. 5.  Bone health. She will take 1500 mg of calcium and 1000 units of vitamin D daily.     I will s

## 2019-03-18 ENCOUNTER — OFFICE VISIT (OUTPATIENT)
Dept: RHEUMATOLOGY | Facility: CLINIC | Age: 59
End: 2019-03-18
Payer: COMMERCIAL

## 2019-03-18 VITALS
HEART RATE: 66 BPM | BODY MASS INDEX: 39.2 KG/M2 | WEIGHT: 213 LBS | SYSTOLIC BLOOD PRESSURE: 134 MMHG | HEIGHT: 62 IN | DIASTOLIC BLOOD PRESSURE: 81 MMHG

## 2019-03-18 DIAGNOSIS — F32.A DEPRESSION, UNSPECIFIED DEPRESSION TYPE: ICD-10-CM

## 2019-03-18 DIAGNOSIS — M79.7 FIBROMYALGIA: ICD-10-CM

## 2019-03-18 DIAGNOSIS — M19.91 PRIMARY OSTEOARTHRITIS, UNSPECIFIED SITE: Primary | ICD-10-CM

## 2019-03-18 PROCEDURE — 99213 OFFICE O/P EST LOW 20 MIN: CPT | Performed by: INTERNAL MEDICINE

## 2019-03-18 PROCEDURE — 99212 OFFICE O/P EST SF 10 MIN: CPT | Performed by: INTERNAL MEDICINE

## 2019-03-18 RX ORDER — LORATADINE 10 MG/1
10 TABLET ORAL DAILY
Refills: 3 | COMMUNITY
Start: 2019-03-09 | End: 2020-01-28

## 2019-03-25 RX ORDER — ERGOCALCIFEROL 1.25 MG/1
CAPSULE ORAL
Qty: 12 CAPSULE | Refills: 3 | Status: SHIPPED | OUTPATIENT
Start: 2019-03-25 | End: 2020-03-18

## 2019-03-30 ENCOUNTER — OFFICE VISIT (OUTPATIENT)
Dept: FAMILY MEDICINE CLINIC | Facility: CLINIC | Age: 59
End: 2019-03-30
Payer: COMMERCIAL

## 2019-03-30 ENCOUNTER — TELEPHONE (OUTPATIENT)
Dept: FAMILY MEDICINE CLINIC | Facility: CLINIC | Age: 59
End: 2019-03-30

## 2019-03-30 VITALS
DIASTOLIC BLOOD PRESSURE: 75 MMHG | BODY MASS INDEX: 38.46 KG/M2 | TEMPERATURE: 98 F | RESPIRATION RATE: 18 BRPM | WEIGHT: 209 LBS | SYSTOLIC BLOOD PRESSURE: 121 MMHG | HEART RATE: 76 BPM | HEIGHT: 62 IN

## 2019-03-30 DIAGNOSIS — R09.81 CHRONIC NASAL CONGESTION: Primary | ICD-10-CM

## 2019-03-30 PROCEDURE — 99212 OFFICE O/P EST SF 10 MIN: CPT | Performed by: FAMILY MEDICINE

## 2019-03-30 PROCEDURE — 99213 OFFICE O/P EST LOW 20 MIN: CPT | Performed by: FAMILY MEDICINE

## 2019-03-30 RX ORDER — AZITHROMYCIN 250 MG/1
TABLET, FILM COATED ORAL
Qty: 6 TABLET | Refills: 0 | Status: SHIPPED | OUTPATIENT
Start: 2019-03-30 | End: 2019-04-28 | Stop reason: ALTCHOICE

## 2019-03-30 RX ORDER — PREDNISONE 20 MG/1
20 TABLET ORAL 2 TIMES DAILY
Qty: 10 TABLET | Refills: 0 | Status: SHIPPED | OUTPATIENT
Start: 2019-03-30 | End: 2019-04-04

## 2019-03-30 NOTE — PROGRESS NOTES
Still no cpap machine    Where: nasal congestion and cough phlegm. Quality (Constant/Sharp?): sharp  Severity: mild mod pain  Duration: 3 months  Timing: constant  When does it occur: day and night.   Modifying factors:   Associated signs symptoms: cough

## 2019-04-26 RX ORDER — LORATADINE 10 MG/1
TABLET ORAL
Qty: 30 TABLET | Refills: 3 | Status: SHIPPED | OUTPATIENT
Start: 2019-04-26 | End: 2019-07-31

## 2019-04-28 ENCOUNTER — APPOINTMENT (OUTPATIENT)
Dept: GENERAL RADIOLOGY | Age: 59
End: 2019-04-28
Attending: EMERGENCY MEDICINE
Payer: COMMERCIAL

## 2019-04-28 ENCOUNTER — HOSPITAL ENCOUNTER (OUTPATIENT)
Age: 59
Discharge: OTHER INSTITUTION | End: 2019-04-28
Attending: EMERGENCY MEDICINE
Payer: COMMERCIAL

## 2019-04-28 ENCOUNTER — APPOINTMENT (OUTPATIENT)
Dept: GENERAL RADIOLOGY | Facility: HOSPITAL | Age: 59
End: 2019-04-28
Attending: EMERGENCY MEDICINE
Payer: COMMERCIAL

## 2019-04-28 ENCOUNTER — HOSPITAL ENCOUNTER (EMERGENCY)
Facility: HOSPITAL | Age: 59
Discharge: HOME | End: 2019-04-28
Attending: EMERGENCY MEDICINE
Payer: COMMERCIAL

## 2019-04-28 ENCOUNTER — APPOINTMENT (OUTPATIENT)
Dept: CT IMAGING | Facility: HOSPITAL | Age: 59
End: 2019-04-28
Attending: EMERGENCY MEDICINE
Payer: COMMERCIAL

## 2019-04-28 VITALS
TEMPERATURE: 98 F | HEART RATE: 78 BPM | OXYGEN SATURATION: 97 % | DIASTOLIC BLOOD PRESSURE: 85 MMHG | HEIGHT: 62 IN | RESPIRATION RATE: 20 BRPM | WEIGHT: 209 LBS | SYSTOLIC BLOOD PRESSURE: 150 MMHG | BODY MASS INDEX: 38.46 KG/M2

## 2019-04-28 VITALS
WEIGHT: 200 LBS | SYSTOLIC BLOOD PRESSURE: 124 MMHG | BODY MASS INDEX: 36.8 KG/M2 | RESPIRATION RATE: 19 BRPM | TEMPERATURE: 98 F | OXYGEN SATURATION: 95 % | DIASTOLIC BLOOD PRESSURE: 52 MMHG | HEART RATE: 68 BPM | HEIGHT: 62 IN

## 2019-04-28 DIAGNOSIS — J20.9 ACUTE BRONCHITIS WITH BRONCHOSPASM: Primary | ICD-10-CM

## 2019-04-28 DIAGNOSIS — R04.2 HEMOPTYSIS: ICD-10-CM

## 2019-04-28 DIAGNOSIS — R06.2 WHEEZING: Primary | ICD-10-CM

## 2019-04-28 DIAGNOSIS — R07.81 PLEURITIC CHEST PAIN: ICD-10-CM

## 2019-04-28 PROCEDURE — 99214 OFFICE O/P EST MOD 30 MIN: CPT

## 2019-04-28 PROCEDURE — 71260 CT THORAX DX C+: CPT | Performed by: EMERGENCY MEDICINE

## 2019-04-28 PROCEDURE — 93005 ELECTROCARDIOGRAM TRACING: CPT

## 2019-04-28 PROCEDURE — 85025 COMPLETE CBC W/AUTO DIFF WBC: CPT

## 2019-04-28 PROCEDURE — 93010 ELECTROCARDIOGRAM REPORT: CPT | Performed by: EMERGENCY MEDICINE

## 2019-04-28 PROCEDURE — 84484 ASSAY OF TROPONIN QUANT: CPT | Performed by: EMERGENCY MEDICINE

## 2019-04-28 PROCEDURE — 94640 AIRWAY INHALATION TREATMENT: CPT

## 2019-04-28 PROCEDURE — 80048 BASIC METABOLIC PNL TOTAL CA: CPT | Performed by: EMERGENCY MEDICINE

## 2019-04-28 PROCEDURE — 71046 X-RAY EXAM CHEST 2 VIEWS: CPT | Performed by: EMERGENCY MEDICINE

## 2019-04-28 PROCEDURE — 36415 COLL VENOUS BLD VENIPUNCTURE: CPT

## 2019-04-28 PROCEDURE — 99285 EMERGENCY DEPT VISIT HI MDM: CPT

## 2019-04-28 PROCEDURE — 93010 ELECTROCARDIOGRAM REPORT: CPT

## 2019-04-28 PROCEDURE — 85025 COMPLETE CBC W/AUTO DIFF WBC: CPT | Performed by: EMERGENCY MEDICINE

## 2019-04-28 PROCEDURE — 80048 BASIC METABOLIC PNL TOTAL CA: CPT

## 2019-04-28 RX ORDER — PREDNISONE 20 MG/1
60 TABLET ORAL ONCE
Status: COMPLETED | OUTPATIENT
Start: 2019-04-28 | End: 2019-04-28

## 2019-04-28 RX ORDER — ALBUTEROL SULFATE 90 UG/1
2 AEROSOL, METERED RESPIRATORY (INHALATION) EVERY 4 HOURS PRN
Qty: 1 INHALER | Refills: 0 | Status: SHIPPED | OUTPATIENT
Start: 2019-04-28 | End: 2019-05-28

## 2019-04-28 RX ORDER — IPRATROPIUM BROMIDE AND ALBUTEROL SULFATE 2.5; .5 MG/3ML; MG/3ML
3 SOLUTION RESPIRATORY (INHALATION) ONCE
Status: COMPLETED | OUTPATIENT
Start: 2019-04-28 | End: 2019-04-28

## 2019-04-28 RX ORDER — PREDNISONE 20 MG/1
TABLET ORAL
Qty: 12 TABLET | Refills: 0 | Status: SHIPPED | OUTPATIENT
Start: 2019-04-28 | End: 2019-08-06 | Stop reason: ALTCHOICE

## 2019-04-28 RX ORDER — ALBUTEROL SULFATE 2.5 MG/3ML
2.5 SOLUTION RESPIRATORY (INHALATION) ONCE
Status: COMPLETED | OUTPATIENT
Start: 2019-04-28 | End: 2019-04-28

## 2019-04-28 NOTE — ED PROVIDER NOTES
Patient Seen in: [facility] In [address] [place] [address]    History   Patient presents with:  Cough/URI  Shortness Of Breath  Chest Pressure    Stated Complaint: cough    HPI  She complains of 4 days of chest tightness, cough, shortness of breath an well-nourished. No distress. Well appearing   HENT:   Head: Normocephalic and atraumatic. Right Ear: External ear normal.   Left Ear: External ear normal.   Eyes: Conjunctivae and EOM are normal.   Neck: Normal range of motion. Neck supple.    Clayborn Veronica

## 2019-04-28 NOTE — ED INITIAL ASSESSMENT (HPI)
Pt to IC with productive cough x 4 days. States she is coughing up blood tinged sputum. Pt states she has anterior/posterior chest pain at center of chest. Reporting occasional shortness of breath while at rest and with activity.

## 2019-04-28 NOTE — ED INITIAL ASSESSMENT (HPI)
C/o cough, chest tightness/brian x4 days, hx of PE, states she is taking eliquis, however is not always compliant with regimen

## 2019-04-28 NOTE — ED PROVIDER NOTES
Patient Seen in: [facility]    History   Patient presents with:  Dyspnea KEN SOB (respiratory)    Stated Complaint: sob/ chest tightness/cough    HPI    19-year-old female with history of hyperlipidemia, hyperthyroidism, esoph SpO2 95%   BMI 36.58 kg/m²         Physical Exam      General Appearance: alert, no distress  Eyes: pupils equal and round no pallor or injection  ENT, Mouth: mucous membranes moist  Respiratory: there are no retractions, diffuse wheezes bilaterally  Car stay.  Will discharge home with a course of steroids along with albuterol MDI.             Disposition and Plan     Clinical Impression:  Acute bronchitis with bronchospasm  (primary encounter diagnosis)    Disposition:  Discharge  4/28/2019  4:00 pm    Fol

## 2019-04-30 ENCOUNTER — TELEPHONE (OUTPATIENT)
Dept: FAMILY MEDICINE CLINIC | Facility: CLINIC | Age: 59
End: 2019-04-30

## 2019-04-30 NOTE — TELEPHONE ENCOUNTER
Pt was seen in the [facility] ER on 4-28-19 and was told to make an appt to see her pcp. Pt would like to see Dr. Vee Ryan this week or next week in the morning at the [facility] location. There are no available appointments.      Please reply to pool: SAMSON Marinelli

## 2019-05-06 RX ORDER — OMEPRAZOLE 40 MG/1
CAPSULE, DELAYED RELEASE ORAL
Qty: 90 CAPSULE | Refills: 1 | Status: SHIPPED | OUTPATIENT
Start: 2019-05-06 | End: 2019-12-09

## 2019-05-06 NOTE — TELEPHONE ENCOUNTER
LOV: 3/18/19  Future Appointments   Date Time Provider Robert Crocker   5/7/2019 11:40 AM [facility]   9/16/2019 11:00 AM Jeffy Hwoell MD [address] [place] [address]     Please advise.

## 2019-05-07 ENCOUNTER — OFFICE VISIT (OUTPATIENT)
Dept: FAMILY MEDICINE CLINIC | Facility: CLINIC | Age: 59
End: 2019-05-07
Payer: COMMERCIAL

## 2019-05-07 VITALS
BODY MASS INDEX: 37.36 KG/M2 | HEIGHT: 62 IN | SYSTOLIC BLOOD PRESSURE: 111 MMHG | OXYGEN SATURATION: 97 % | TEMPERATURE: 98 F | RESPIRATION RATE: 20 BRPM | HEART RATE: 70 BPM | WEIGHT: 203 LBS | DIASTOLIC BLOOD PRESSURE: 73 MMHG

## 2019-05-07 DIAGNOSIS — R91.8 PULMONARY NODULES: ICD-10-CM

## 2019-05-07 DIAGNOSIS — Z86.711 HISTORY OF PULMONARY EMBOLUS (PE): ICD-10-CM

## 2019-05-07 DIAGNOSIS — J40 BRONCHITIS: ICD-10-CM

## 2019-05-07 DIAGNOSIS — R05.9 COUGH: Primary | ICD-10-CM

## 2019-05-07 PROCEDURE — 99214 OFFICE O/P EST MOD 30 MIN: CPT | Performed by: FAMILY MEDICINE

## 2019-05-07 PROCEDURE — 90670 PCV13 VACCINE IM: CPT | Performed by: FAMILY MEDICINE

## 2019-05-07 PROCEDURE — 90471 IMMUNIZATION ADMIN: CPT | Performed by: FAMILY MEDICINE

## 2019-05-07 PROCEDURE — 99212 OFFICE O/P EST SF 10 MIN: CPT | Performed by: FAMILY MEDICINE

## 2019-05-07 NOTE — PROGRESS NOTES
Coughing for 2 weeks but  \"I always kind of cough\"  Reviewed ct.  =====  CONCLUSION:   1. Tiny residual or recurrent pulmonary embolism involving a distal segmental branch of the left lower lobe.   Pulmonary embolic burden otherwise appears significantly rales  Heart was regular rate and rhythm normal S1-S2 no S3 no S4 there were no murmurs appreciated  Abdomen was soft non tender non distended bowel sounds were present  Extremities there was no cyanosis or edema      Assessment/ Plan  1.  Cough  ellipa con

## 2019-06-28 NOTE — TELEPHONE ENCOUNTER
Pharmacy requesting a refill on Jill Ellipta, order pending. Fluticasone Furoate-Vilanterol (BREO ELLIPTA) 100-25 MCG/INH Inhalation Aerosol Powder, Breath Activated 1 each 1 5/7/2019    Sig:   Inhale 1 puff into the lungs daily.      Route:   Inhalation

## 2019-06-28 NOTE — TELEPHONE ENCOUNTER
Please advise on refill request    Requested Prescriptions     Pending Prescriptions Disp Refills   • Fluticasone Furoate-Vilanterol (BREO ELLIPTA) 100-25 MCG/INH Inhalation Aerosol Powder, Breath Activated 1 each 1     Sig: Inhale 1 puff into the lungs da

## 2019-06-28 NOTE — TELEPHONE ENCOUNTER
Refill request  Current Outpatient Medications:   •  Fluticasone Furoate-Vilanterol (BREO ELLIPTA) 100-25 MCG/INH Inhalation Aerosol Powder, Breath Activated, Inhale 1 puff into the lungs daily. , Disp: 1 each, Rfl: 1

## 2019-07-03 RX ORDER — FLUTICASONE FUROATE AND VILANTEROL TRIFENATATE 100; 25 UG/1; UG/1
POWDER RESPIRATORY (INHALATION)
Qty: 180 EACH | Refills: 1 | Status: SHIPPED | OUTPATIENT
Start: 2019-07-03 | End: 2020-06-13

## 2019-07-03 NOTE — TELEPHONE ENCOUNTER
Refill passed per [facility], [facility] protocol.   Refill Protocol Appointment Criteria  · Appointment scheduled in the past 6 months or in the next 3 months  Recent Outpatient Visits            1 month ago Cough    [address], [address] [facility]

## 2019-08-01 RX ORDER — LORATADINE 10 MG/1
TABLET ORAL
Qty: 30 TABLET | Refills: 3 | Status: SHIPPED | OUTPATIENT
Start: 2019-08-01 | End: 2019-08-06

## 2019-08-03 ENCOUNTER — HOSPITAL ENCOUNTER (OUTPATIENT)
Dept: CT IMAGING | Facility: HOSPITAL | Age: 59
Discharge: HOME | End: 2019-08-03
Attending: FAMILY MEDICINE
Payer: COMMERCIAL

## 2019-08-03 DIAGNOSIS — R91.8 PULMONARY NODULES: ICD-10-CM

## 2019-08-03 DIAGNOSIS — Z86.711 HISTORY OF PULMONARY EMBOLUS (PE): ICD-10-CM

## 2019-08-03 PROCEDURE — 71250 CT THORAX DX C-: CPT | Performed by: FAMILY MEDICINE

## 2019-08-06 ENCOUNTER — OFFICE VISIT (OUTPATIENT)
Dept: PULMONOLOGY | Facility: CLINIC | Age: 59
End: 2019-08-06
Payer: COMMERCIAL

## 2019-08-06 VITALS
WEIGHT: 208 LBS | HEIGHT: 61 IN | BODY MASS INDEX: 39.27 KG/M2 | TEMPERATURE: 98 F | HEART RATE: 63 BPM | RESPIRATION RATE: 16 BRPM | DIASTOLIC BLOOD PRESSURE: 73 MMHG | OXYGEN SATURATION: 97 % | SYSTOLIC BLOOD PRESSURE: 129 MMHG

## 2019-08-06 DIAGNOSIS — I26.99 OTHER PULMONARY EMBOLISM WITHOUT ACUTE COR PULMONALE, UNSPECIFIED CHRONICITY (HCC): Primary | ICD-10-CM

## 2019-08-06 DIAGNOSIS — G47.33 OSA (OBSTRUCTIVE SLEEP APNEA): ICD-10-CM

## 2019-08-06 PROCEDURE — 99244 OFF/OP CNSLTJ NEW/EST MOD 40: CPT | Performed by: INTERNAL MEDICINE

## 2019-08-06 NOTE — PROGRESS NOTES
Dear Gavi Rock:           As you know, Dodie Carr is a 25-year-old female who I am now evaluating for abnormal CT scan the chest with history of pulmonary embolism, sleep apnea and pulmonary nodules.     HISTORY OF PRESENT ILLNESS: The patient has a mild chronic un with pupils equal round and reactive to light and accommodation. Neck without adenopathy, thyromegaly, JVD nor bruit. Lungs clear to auscultation and percussion. Cardiac regular rate and rhythm no murmur.  Abdomen nontender, without hepatosplenomegaly and n suggestion of a nodule identified on the CT scan. The patient will have a mammogram later this year. RECOMMENDATIONS:  1. As per primary care, follow-up mammography    I am delighted to assist in [place] [id number] care.             With warmest regards,     Nishant Weems

## 2019-08-12 DIAGNOSIS — Z12.39 SCREENING FOR BREAST CANCER: Primary | ICD-10-CM

## 2019-08-12 DIAGNOSIS — Z87.898 HISTORY OF LUMP OF RIGHT BREAST: ICD-10-CM

## 2019-08-14 NOTE — TELEPHONE ENCOUNTER
Dr Vinita Vela, this med listed as historical  Protocol failed.   Requested Prescriptions     Pending Prescriptions Disp Refills   • FENOFIBRATE 160 MG Oral Tab [Pharmacy Med Name: FENOFIBRATE 160 MG TABLET] 90 tablet 2     Sig: TOME GINNY TABLETA POR VIA ORAL [address], [address] [facility], [place],     Office Visit        Future Appointments       Provider Department Appt Notes    In 1 month Ce Segal MD [facility], [address] includes the [facility] [address] 6 month f/u    In 2 months Lucas Hatchet

## 2019-08-15 RX ORDER — FENOFIBRATE 160 MG/1
TABLET ORAL
Qty: 90 TABLET | Refills: 11 | Status: SHIPPED | OUTPATIENT
Start: 2019-08-15 | End: 2020-08-26

## 2019-08-26 ENCOUNTER — TELEPHONE (OUTPATIENT)
Dept: PULMONOLOGY | Facility: CLINIC | Age: 59
End: 2019-08-26

## 2019-08-26 NOTE — TELEPHONE ENCOUNTER
Pt calling to emerald welch rn regarding update on equipment for cpap from [facility], pt indicates she did receive a call in Roberto Simone from [address] [place] [address] and asked for them to return her call in [address] [facility], pt has not yet received a call from [facility].  Pls call pt with

## 2019-09-11 ENCOUNTER — NURSE TRIAGE (OUTPATIENT)
Dept: OTHER | Age: 59
End: 2019-09-11

## 2019-09-11 ENCOUNTER — TELEPHONE (OUTPATIENT)
Dept: FAMILY MEDICINE CLINIC | Facility: CLINIC | Age: 59
End: 2019-09-11

## 2019-09-11 NOTE — TELEPHONE ENCOUNTER
pt. States that she is having stomach pain and diarrhea for 3 weeks. Pt. States that she has Pepto Bismal, and Immodin.

## 2019-09-12 NOTE — PROGRESS NOTES
Dodie Carr is a 60-year-old woman with diffuse myofascial pain, depression, and osteoarthritis. Since I saw her March 18th of 2019, she has remained on sertraline 150 mg every morning. It is helping.   She has also been taking nortriptyline 10-20 mg at bedtime, bedtime. Her Rxs have been refilled. 3. Depression. Sertraline 150 mg every morning. 4.  Bone health. She will take 1500 mg of calcium and 1000 units of vitamin D daily. I will see her back in 6 months.

## 2019-09-16 ENCOUNTER — OFFICE VISIT (OUTPATIENT)
Dept: RHEUMATOLOGY | Facility: CLINIC | Age: 59
End: 2019-09-16
Payer: COMMERCIAL

## 2019-09-16 VITALS
HEIGHT: 62 IN | BODY MASS INDEX: 38.37 KG/M2 | DIASTOLIC BLOOD PRESSURE: 61 MMHG | HEART RATE: 66 BPM | SYSTOLIC BLOOD PRESSURE: 106 MMHG | WEIGHT: 208.5 LBS

## 2019-09-16 DIAGNOSIS — M19.91 PRIMARY OSTEOARTHRITIS, UNSPECIFIED SITE: Primary | ICD-10-CM

## 2019-09-16 DIAGNOSIS — F32.A DEPRESSION, UNSPECIFIED DEPRESSION TYPE: ICD-10-CM

## 2019-09-16 DIAGNOSIS — M79.7 FIBROMYALGIA: ICD-10-CM

## 2019-09-16 PROCEDURE — 99213 OFFICE O/P EST LOW 20 MIN: CPT | Performed by: INTERNAL MEDICINE

## 2019-10-13 NOTE — TELEPHONE ENCOUNTER
Please review; protocol failed.       Requested Prescriptions   Pending Prescriptions Disp Refills   • SIMVASTATIN 20 MG Oral Tab [Pharmacy Med Name: SIMVASTATIN 20 MG TABLET] 90 tablet 1     Sig: TAKE 1 TABLET(S) BY MOUTH AT BEDTIME       Cholesterol Medic

## 2019-10-14 RX ORDER — SIMVASTATIN 20 MG
TABLET ORAL
Qty: 90 TABLET | Refills: 11 | Status: SHIPPED | OUTPATIENT
Start: 2019-10-14 | End: 2020-10-22

## 2019-11-18 ENCOUNTER — OFFICE VISIT (OUTPATIENT)
Dept: PULMONOLOGY | Facility: CLINIC | Age: 59
End: 2019-11-18
Payer: COMMERCIAL

## 2019-11-18 VITALS
HEIGHT: 62 IN | WEIGHT: 208 LBS | DIASTOLIC BLOOD PRESSURE: 78 MMHG | SYSTOLIC BLOOD PRESSURE: 123 MMHG | HEART RATE: 71 BPM | OXYGEN SATURATION: 98 % | BODY MASS INDEX: 38.28 KG/M2 | RESPIRATION RATE: 18 BRPM

## 2019-11-18 DIAGNOSIS — I26.99 OTHER PULMONARY EMBOLISM WITHOUT ACUTE COR PULMONALE, UNSPECIFIED CHRONICITY (HCC): Primary | ICD-10-CM

## 2019-11-18 PROCEDURE — 99213 OFFICE O/P EST LOW 20 MIN: CPT | Performed by: INTERNAL MEDICINE

## 2019-11-18 NOTE — PROGRESS NOTES
The patient is a 68-year-old female who I know well from prior evaluation comes in now for follow-up. Her breathing is pretty good. She is vigilant with use of her CPAP device every night all night. She is sleeping better.     Review of Systems:  Vision

## 2019-12-09 ENCOUNTER — OFFICE VISIT (OUTPATIENT)
Dept: FAMILY MEDICINE CLINIC | Facility: CLINIC | Age: 59
End: 2019-12-09
Payer: COMMERCIAL

## 2019-12-09 VITALS
DIASTOLIC BLOOD PRESSURE: 71 MMHG | WEIGHT: 216.38 LBS | HEART RATE: 69 BPM | TEMPERATURE: 98 F | HEIGHT: 62 IN | SYSTOLIC BLOOD PRESSURE: 131 MMHG | BODY MASS INDEX: 39.82 KG/M2

## 2019-12-09 DIAGNOSIS — R49.0 HOARSENESS: Primary | ICD-10-CM

## 2019-12-09 DIAGNOSIS — K21.9 GASTROESOPHAGEAL REFLUX DISEASE WITHOUT ESOPHAGITIS: ICD-10-CM

## 2019-12-09 DIAGNOSIS — R42 DIZZINESS: ICD-10-CM

## 2019-12-09 DIAGNOSIS — E66.09 CLASS 2 OBESITY DUE TO EXCESS CALORIES WITHOUT SERIOUS COMORBIDITY WITH BODY MASS INDEX (BMI) OF 36.0 TO 36.9 IN ADULT: ICD-10-CM

## 2019-12-09 DIAGNOSIS — G47.33 OBSTRUCTIVE SLEEP APNEA SYNDROME: ICD-10-CM

## 2019-12-09 DIAGNOSIS — R07.9 CHEST PAIN, UNSPECIFIED TYPE: ICD-10-CM

## 2019-12-09 DIAGNOSIS — E78.1 HYPERTRIGLYCERIDEMIA: ICD-10-CM

## 2019-12-09 DIAGNOSIS — Z12.31 VISIT FOR SCREENING MAMMOGRAM: ICD-10-CM

## 2019-12-09 PROCEDURE — 99215 OFFICE O/P EST HI 40 MIN: CPT | Performed by: FAMILY MEDICINE

## 2019-12-09 RX ORDER — PANTOPRAZOLE SODIUM 40 MG/1
40 TABLET, DELAYED RELEASE ORAL
Qty: 90 TABLET | Refills: 1 | Status: SHIPPED | OUTPATIENT
Start: 2019-12-09 | End: 2020-07-28

## 2019-12-09 NOTE — PROGRESS NOTES
Hoarse voice  Chronic  Has a cough  Some tomas still  Some mild chest pain  Drinks caffeine still no weight loss    occ dizziness        Patient's past medical surgical family social history was reviewed.       Review of Systems    Allergic: no environmental Refill: 1    2. Gastroesophageal reflux disease without esophagitis    Follow-up ENT decrease and then eliminate caffeine  - ENT - INTERNAL    3. Obstructive sleep apnea syndrome  Follow-up with pulmonary    4.  Hypertriglyceridemia  Recheck labs in a few m

## 2019-12-21 ENCOUNTER — HOSPITAL ENCOUNTER (OUTPATIENT)
Dept: MAMMOGRAPHY | Age: 59
Discharge: HOME | End: 2019-12-21
Attending: FAMILY MEDICINE
Payer: COMMERCIAL

## 2019-12-21 DIAGNOSIS — Z87.898 HISTORY OF LUMP OF RIGHT BREAST: ICD-10-CM

## 2019-12-21 DIAGNOSIS — Z12.39 SCREENING FOR BREAST CANCER: ICD-10-CM

## 2019-12-21 PROCEDURE — 77063 BREAST TOMOSYNTHESIS BI: CPT | Performed by: FAMILY MEDICINE

## 2019-12-21 PROCEDURE — 77067 SCR MAMMO BI INCL CAD: CPT | Performed by: FAMILY MEDICINE

## 2019-12-24 RX ORDER — APIXABAN 5 MG/1
TABLET, FILM COATED ORAL
Qty: 60 TABLET | Refills: 1 | Status: SHIPPED | OUTPATIENT
Start: 2019-12-24 | End: 2020-06-13

## 2019-12-26 ENCOUNTER — OFFICE VISIT (OUTPATIENT)
Dept: CARDIOLOGY CLINIC | Facility: CLINIC | Age: 59
End: 2019-12-26
Payer: COMMERCIAL

## 2019-12-26 ENCOUNTER — OFFICE VISIT (OUTPATIENT)
Dept: OTOLARYNGOLOGY | Facility: CLINIC | Age: 59
End: 2019-12-26
Payer: COMMERCIAL

## 2019-12-26 ENCOUNTER — OFFICE VISIT (OUTPATIENT)
Dept: AUDIOLOGY | Facility: CLINIC | Age: 59
End: 2019-12-26
Payer: COMMERCIAL

## 2019-12-26 VITALS — HEART RATE: 75 BPM | DIASTOLIC BLOOD PRESSURE: 71 MMHG | TEMPERATURE: 97 F | SYSTOLIC BLOOD PRESSURE: 123 MMHG

## 2019-12-26 VITALS
HEIGHT: 62 IN | BODY MASS INDEX: 40.05 KG/M2 | WEIGHT: 217.63 LBS | SYSTOLIC BLOOD PRESSURE: 138 MMHG | DIASTOLIC BLOOD PRESSURE: 70 MMHG | HEART RATE: 69 BPM | RESPIRATION RATE: 18 BRPM

## 2019-12-26 DIAGNOSIS — G47.33 OSA (OBSTRUCTIVE SLEEP APNEA): ICD-10-CM

## 2019-12-26 DIAGNOSIS — I27.82 OTHER CHRONIC PULMONARY EMBOLISM WITHOUT ACUTE COR PULMONALE (HCC): ICD-10-CM

## 2019-12-26 DIAGNOSIS — E78.1 HYPERTRIGLYCERIDEMIA: ICD-10-CM

## 2019-12-26 DIAGNOSIS — R07.9 CHEST PAIN, UNSPECIFIED TYPE: Primary | ICD-10-CM

## 2019-12-26 DIAGNOSIS — R49.0 HOARSENESS: ICD-10-CM

## 2019-12-26 DIAGNOSIS — I27.20 PULMONARY HYPERTENSION (HCC): ICD-10-CM

## 2019-12-26 DIAGNOSIS — H90.0 CONDUCTIVE HEARING LOSS, BILATERAL: Primary | ICD-10-CM

## 2019-12-26 DIAGNOSIS — H93.11 TINNITUS AURIUM, RIGHT: Primary | ICD-10-CM

## 2019-12-26 DIAGNOSIS — E66.09 CLASS 2 OBESITY DUE TO EXCESS CALORIES WITHOUT SERIOUS COMORBIDITY WITH BODY MASS INDEX (BMI) OF 39.0 TO 39.9 IN ADULT: ICD-10-CM

## 2019-12-26 PROBLEM — E66.812 CLASS 2 OBESITY DUE TO EXCESS CALORIES WITHOUT SERIOUS COMORBIDITY WITH BODY MASS INDEX (BMI) OF 39.0 TO 39.9 IN ADULT: Status: ACTIVE | Noted: 2018-05-07

## 2019-12-26 PROCEDURE — 99213 OFFICE O/P EST LOW 20 MIN: CPT | Performed by: OTOLARYNGOLOGY

## 2019-12-26 PROCEDURE — 92557 COMPREHENSIVE HEARING TEST: CPT | Performed by: AUDIOLOGIST

## 2019-12-26 PROCEDURE — 92567 TYMPANOMETRY: CPT | Performed by: AUDIOLOGIST

## 2019-12-26 PROCEDURE — 31575 DIAGNOSTIC LARYNGOSCOPY: CPT | Performed by: OTOLARYNGOLOGY

## 2019-12-26 PROCEDURE — 99245 OFF/OP CONSLTJ NEW/EST HI 55: CPT | Performed by: INTERNAL MEDICINE

## 2019-12-26 NOTE — PROGRESS NOTES
[address] [facility]    Cardiac Electrophysiology Consultation  2019    Name:  Dave Kate  : 3/10/1960    Date of consultation:   2019    Referring physician: Dr. Nichole Lawler    Reason for Consultation:  Chest pain    History of Present Allergies    Medications:  Pantoprazole Sodium 40 MG Oral Tab EC, Take 1 tablet (40 mg total) by mouth every morning before breakfast., Disp: 90 tablet, Rfl: 1  SIMVASTATIN 20 MG Oral Tab, TAKE 1 TABLET(S) BY MOUTH AT BEDTIME, Disp: 90 tablet, Rfl: 11  Ser when lying down  GI: + occasional abdominal pain, + heartburn  : no dysuria or hematuria  NEURO: no headaches, no focal weaknesses or paresthesias  All other systems reviewed and negative    Physical Exam:  Vital Signs: /70   Pulse 69   Resp 18   H 0.75 04/28/2019    ANIONGAP 5 04/28/2019    GFRNAA 87 04/28/2019    GFRAA 100 04/28/2019    CA 9.4 04/28/2019    OSMOCALC 295 04/28/2019    ALKPHO 54 04/16/2018    AST 28 04/16/2018    ALT 22 04/16/2018    BILT 0.5 04/16/2018    TP 7.5 04/16/2018    ALB 4.

## 2019-12-26 NOTE — PROGRESS NOTES
Juanita Medina is a 60year old female.  Patient presents with:  Voice Problem: hoarsness very long time   Ear Problem: left ear feels like fluid, right ear is ringing     HPI:   She is a tinnitus in both ears which is been present for some time, right gre mg total) by mouth nightly.  (Patient not taking: Reported on 12/9/2019 ) 30 tablet 3   • clotrimazole-betamethasone 1-0.05 % External Cream Apply bid (Patient not taking: Reported on 12/9/2019 ) 60 g 3   • Nutritional Supplements (ESTROVEN OR) Take by mout Thyroid gland - Normal.   Psychiatric Normal Orientation - Oriented to time, place, person & situation. Appropriate mood and affect. Lymph Detail Normal Submental. Submandibular. Anterior cervical. Posterior cervical. Supraclavicular.    Eyes Normal Conju

## 2019-12-26 NOTE — PATIENT INSTRUCTIONS
-Exercise nuclear stress test  -Consult with Dr Alycia Vance or Dr LOUISE [facility] for evaluation of pulmonary hypertension

## 2019-12-27 NOTE — PROGRESS NOTES
AUDIOLOGY REPORT      Paulo Clifton is a 60year old female     Referring Provider: Lj Rosalia   YOB: 1960  Medical Record: [id number]      Patient Hearing History:   Patient had previous hearing test at this office on 12-15-18.      Omar

## 2019-12-31 ENCOUNTER — HOSPITAL ENCOUNTER (OUTPATIENT)
Dept: CV DIAGNOSTICS | Facility: HOSPITAL | Age: 59
Discharge: HOME | End: 2019-12-31
Attending: INTERNAL MEDICINE
Payer: COMMERCIAL

## 2019-12-31 ENCOUNTER — HOSPITAL ENCOUNTER (OUTPATIENT)
Dept: NUCLEAR MEDICINE | Facility: HOSPITAL | Age: 59
Discharge: HOME | End: 2019-12-31
Attending: INTERNAL MEDICINE
Payer: COMMERCIAL

## 2019-12-31 DIAGNOSIS — G47.33 OSA (OBSTRUCTIVE SLEEP APNEA): ICD-10-CM

## 2019-12-31 DIAGNOSIS — E78.1 HYPERTRIGLYCERIDEMIA: ICD-10-CM

## 2019-12-31 DIAGNOSIS — I27.20 PULMONARY HYPERTENSION (HCC): ICD-10-CM

## 2019-12-31 DIAGNOSIS — R07.9 CHEST PAIN, UNSPECIFIED TYPE: ICD-10-CM

## 2019-12-31 DIAGNOSIS — E66.09 CLASS 2 OBESITY DUE TO EXCESS CALORIES WITHOUT SERIOUS COMORBIDITY WITH BODY MASS INDEX (BMI) OF 39.0 TO 39.9 IN ADULT: ICD-10-CM

## 2019-12-31 PROCEDURE — 93018 CV STRESS TEST I&R ONLY: CPT | Performed by: INTERNAL MEDICINE

## 2019-12-31 PROCEDURE — 93016 CV STRESS TEST SUPVJ ONLY: CPT | Performed by: INTERNAL MEDICINE

## 2019-12-31 PROCEDURE — 93017 CV STRESS TEST TRACING ONLY: CPT | Performed by: INTERNAL MEDICINE

## 2019-12-31 PROCEDURE — 78452 HT MUSCLE IMAGE SPECT MULT: CPT | Performed by: INTERNAL MEDICINE

## 2019-12-31 NOTE — IMAGING NOTE
Patient here for outpatient nuclear exercise stress test ordered by Dr. Lopez Paz. Patient states she has knee issues. Attempted to walk patient on treadmill, warm-up phase.  Patient walks with mild limp on treadmill, and complains of knee pain upon walking chon

## 2020-01-08 ENCOUNTER — TELEPHONE (OUTPATIENT)
Dept: CARDIOLOGY CLINIC | Facility: CLINIC | Age: 60
End: 2020-01-08

## 2020-01-24 ENCOUNTER — TELEPHONE (OUTPATIENT)
Dept: FAMILY MEDICINE CLINIC | Facility: CLINIC | Age: 60
End: 2020-01-24

## 2020-01-24 NOTE — TELEPHONE ENCOUNTER
Spoke with pt's  Hesus, Left message for pt to call back.     Pt was prescribed generic LR -7/3/19 with 3 refills

## 2020-01-24 NOTE — TELEPHONE ENCOUNTER
Patient speaks Macedonian. Patient states medication is now becoming expensive. She states she usually pays $30 but pharmacy now want $65. Requesting if medicine could be switched to generic or requesting an alternative.     GEOFF ELLIPTA 100-25 MCG/INH [facility]

## 2020-01-25 NOTE — TELEPHONE ENCOUNTER
Attempt made to contact patient; patient unavailable, left message instructing patient to return call to the clinic.

## 2020-01-28 RX ORDER — LORATADINE 10 MG/1
TABLET ORAL
Qty: 90 TABLET | Refills: 1 | Status: SHIPPED | OUTPATIENT
Start: 2020-01-28 | End: 2020-07-27

## 2020-01-28 NOTE — TELEPHONE ENCOUNTER
With  Scooby Anderson ID# [id number]  Left message to call back; please transfer to Triage. No response letter sent in [address] [place] [address].

## 2020-02-04 RX ORDER — MONTELUKAST SODIUM 10 MG/1
10 TABLET ORAL NIGHTLY
COMMUNITY

## 2020-02-04 RX ORDER — PANTOPRAZOLE SODIUM 40 MG/1
40 TABLET, DELAYED RELEASE ORAL DAILY
COMMUNITY

## 2020-02-04 RX ORDER — NORTRIPTYLINE HYDROCHLORIDE 10 MG/1
10 CAPSULE ORAL NIGHTLY
COMMUNITY

## 2020-02-04 RX ORDER — SERTRALINE HYDROCHLORIDE 100 MG/1
150 TABLET, FILM COATED ORAL DAILY
COMMUNITY

## 2020-02-04 RX ORDER — FENOFIBRATE 160 MG/1
160 TABLET ORAL DAILY
COMMUNITY

## 2020-02-04 RX ORDER — LORATADINE 10 MG/1
10 TABLET ORAL DAILY
COMMUNITY

## 2020-02-04 RX ORDER — SIMVASTATIN 20 MG
20 TABLET ORAL NIGHTLY
COMMUNITY

## 2020-02-04 RX ORDER — FLUTICASONE FUROATE AND VILANTEROL 100; 25 UG/1; UG/1
1 POWDER RESPIRATORY (INHALATION) DAILY
COMMUNITY

## 2020-02-06 ENCOUNTER — APPOINTMENT (OUTPATIENT)
Dept: CARDIOLOGY | Age: 60
End: 2020-02-06

## 2020-03-02 RX ORDER — NORTRIPTYLINE HYDROCHLORIDE 10 MG/1
CAPSULE ORAL
Qty: 180 CAPSULE | Refills: 3 | Status: SHIPPED | OUTPATIENT
Start: 2020-03-02

## 2020-03-02 NOTE — TELEPHONE ENCOUNTER
LOV: 9/16/2019  Future Appointments   Date Time Provider Robert Crocker   3/16/2020 11:40 AM Elena Sparks MD [address] [facility]   5/18/2020 12:45 PM Beata Lopez MD [facility]     Please advise

## 2020-03-04 NOTE — PROGRESS NOTES
Diagnosis:Primary osteoarthritis, unspecified site (M19.91)  Fibromyalgia (M79.7)  Visit #:  2 ([facility]      Next MD visit: none scheduled  Fall Risk: standard         Precautions: n/a           Medication Changes since last visit?: Kinsey Nino
Awake/Alert/Cooperative

## 2020-03-05 ENCOUNTER — APPOINTMENT (OUTPATIENT)
Dept: CARDIOLOGY | Age: 60
End: 2020-03-05

## 2020-03-10 NOTE — PROGRESS NOTES
Hanane Marin is a 77-year-old woman with diffuse myofascial pain, depression, and osteoarthritis. Since I saw her September 16th of 2019, she has remained on sertraline 150 mg every morning. It is helping.   She has also been taking nortriptyline 10-20 mg at bedt 150 mg every morning. 4.  Bone health. She will take 1500 mg of calcium and 1000 units of vitamin D daily. 5.  Right shoulder rotator cuff tendinitis.   After informed consent was obtained, her right shoulder was injected into the subacromial area with

## 2020-03-16 ENCOUNTER — OFFICE VISIT (OUTPATIENT)
Dept: RHEUMATOLOGY | Facility: CLINIC | Age: 60
End: 2020-03-16
Payer: COMMERCIAL

## 2020-03-16 ENCOUNTER — TELEPHONE (OUTPATIENT)
Dept: FAMILY MEDICINE CLINIC | Facility: CLINIC | Age: 60
End: 2020-03-16

## 2020-03-16 VITALS
SYSTOLIC BLOOD PRESSURE: 124 MMHG | HEART RATE: 59 BPM | BODY MASS INDEX: 39.38 KG/M2 | HEIGHT: 62 IN | WEIGHT: 214 LBS | DIASTOLIC BLOOD PRESSURE: 76 MMHG

## 2020-03-16 DIAGNOSIS — E03.9 HYPOTHYROIDISM, UNSPECIFIED TYPE: Primary | ICD-10-CM

## 2020-03-16 DIAGNOSIS — M79.7 FIBROMYALGIA: ICD-10-CM

## 2020-03-16 DIAGNOSIS — M19.91 PRIMARY OSTEOARTHRITIS, UNSPECIFIED SITE: Primary | ICD-10-CM

## 2020-03-16 DIAGNOSIS — M75.81 ROTATOR CUFF TENDONITIS, RIGHT: ICD-10-CM

## 2020-03-16 PROCEDURE — 20610 DRAIN/INJ JOINT/BURSA W/O US: CPT | Performed by: INTERNAL MEDICINE

## 2020-03-16 PROCEDURE — 99213 OFFICE O/P EST LOW 20 MIN: CPT | Performed by: INTERNAL MEDICINE

## 2020-03-16 RX ORDER — METHYLPREDNISOLONE ACETATE 40 MG/ML
40 INJECTION, SUSPENSION INTRA-ARTICULAR; INTRALESIONAL; INTRAMUSCULAR; SOFT TISSUE ONCE
Status: COMPLETED | OUTPATIENT
Start: 2020-03-16 | End: 2020-03-16

## 2020-03-16 RX ADMIN — METHYLPREDNISOLONE ACETATE 40 MG: 40 INJECTION, SUSPENSION INTRA-ARTICULAR; INTRALESIONAL; INTRAMUSCULAR; SOFT TISSUE at 12:15:00

## 2020-03-16 NOTE — TELEPHONE ENCOUNTER
Per pharmacy pt is requesting refill for the following medication.       •  ERGOCALCIFEROL 91895 units Oral Cap, TOME GINNY CAPSULA POR VIA ORAL GINNY VES POR SEMANA, Disp: 12 capsule, Rfl: 3

## 2020-03-18 RX ORDER — ERGOCALCIFEROL 1.25 MG/1
CAPSULE ORAL
Qty: 12 CAPSULE | Refills: 3 | Status: SHIPPED | OUTPATIENT
Start: 2020-03-18 | End: 2021-05-28

## 2020-03-18 NOTE — TELEPHONE ENCOUNTER
Pharmacy calling to follow up on refill request. Pharmacy states that Pt is out of medication. Please advise.

## 2020-05-18 ENCOUNTER — OFFICE VISIT (OUTPATIENT)
Dept: PULMONOLOGY | Facility: CLINIC | Age: 60
End: 2020-05-18
Payer: COMMERCIAL

## 2020-05-18 VITALS
HEART RATE: 80 BPM | SYSTOLIC BLOOD PRESSURE: 158 MMHG | WEIGHT: 213 LBS | HEIGHT: 62 IN | RESPIRATION RATE: 18 BRPM | DIASTOLIC BLOOD PRESSURE: 76 MMHG | BODY MASS INDEX: 39.2 KG/M2 | OXYGEN SATURATION: 97 %

## 2020-05-18 DIAGNOSIS — G47.33 OSA (OBSTRUCTIVE SLEEP APNEA): Primary | ICD-10-CM

## 2020-05-18 DIAGNOSIS — N64.9 BREAST LESION: ICD-10-CM

## 2020-05-18 PROCEDURE — 99213 OFFICE O/P EST LOW 20 MIN: CPT | Performed by: INTERNAL MEDICINE

## 2020-05-18 NOTE — PROGRESS NOTES
The patient is a 79-year-old female who I know well from prior evaluation comes in now for follow-up. She has a history of venous thromboembolism and is vigilant with baby aspirin every day. The pulmonary nodule had resolved on serial CT imaging.   She is

## 2020-06-03 ENCOUNTER — TELEPHONE (OUTPATIENT)
Dept: PULMONOLOGY | Facility: CLINIC | Age: 60
End: 2020-06-03

## 2020-06-04 ENCOUNTER — HOSPITAL ENCOUNTER (OUTPATIENT)
Dept: MAMMOGRAPHY | Facility: HOSPITAL | Age: 60
Discharge: HOME | End: 2020-06-04
Attending: INTERNAL MEDICINE
Payer: COMMERCIAL

## 2020-06-04 DIAGNOSIS — N64.9 BREAST LESION: ICD-10-CM

## 2020-06-04 PROCEDURE — 77066 DX MAMMO INCL CAD BI: CPT | Performed by: INTERNAL MEDICINE

## 2020-06-04 PROCEDURE — 77062 BREAST TOMOSYNTHESIS BI: CPT | Performed by: INTERNAL MEDICINE

## 2020-06-04 NOTE — TELEPHONE ENCOUNTER
Attempted to call Reina Luna from mammography department. Unable to reach. Phone has continuous ringing & no option to leave message. Will f/u later today.

## 2020-06-13 ENCOUNTER — OFFICE VISIT (OUTPATIENT)
Dept: FAMILY MEDICINE CLINIC | Facility: CLINIC | Age: 60
End: 2020-06-13
Payer: COMMERCIAL

## 2020-06-13 VITALS
WEIGHT: 218 LBS | HEIGHT: 62 IN | HEART RATE: 69 BPM | SYSTOLIC BLOOD PRESSURE: 136 MMHG | BODY MASS INDEX: 40.12 KG/M2 | DIASTOLIC BLOOD PRESSURE: 69 MMHG | TEMPERATURE: 97 F

## 2020-06-13 DIAGNOSIS — Z00.00 ANNUAL PHYSICAL EXAM: Primary | ICD-10-CM

## 2020-06-13 PROCEDURE — 99396 PREV VISIT EST AGE 40-64: CPT | Performed by: FAMILY MEDICINE

## 2020-06-13 PROCEDURE — 99213 OFFICE O/P EST LOW 20 MIN: CPT | Performed by: FAMILY MEDICINE

## 2020-06-13 RX ORDER — CLONAZEPAM 0.5 MG/1
0.5 TABLET ORAL 2 TIMES DAILY PRN
Qty: 40 TABLET | Refills: 0 | Status: SHIPPED | OUTPATIENT
Start: 2020-06-13 | End: 2020-09-09

## 2020-06-13 RX ORDER — QUETIAPINE 25 MG/1
25 TABLET, FILM COATED ORAL NIGHTLY
Qty: 90 TABLET | Refills: 0 | Status: SHIPPED | OUTPATIENT
Start: 2020-06-13 | End: 2020-09-08

## 2020-06-14 NOTE — PROGRESS NOTES
HPI:    Patient ID: Julian Wade is a 63year old female. Patient here for annual physical and f/u anxiety. Her anxety has been getting worse and feels quite angry often . Also having hard tie loosing weight and gained quite a bit of weight lately. well-developed and well-nourished. No distress. obese   HENT:   Head: Normocephalic. Mouth/Throat: Oropharynx is clear and moist.   Eyes: Right eye exhibits no discharge. Left eye exhibits no discharge. Neck: Neck supple. No JVD present.  No thyromega PAP Smear      THINPREP PAP SMEAR ONLY      Meds This Visit:  Requested Prescriptions     Signed Prescriptions Disp Refills   • QUEtiapine Fumarate (SEROQUEL) 25 MG Oral Tab 90 tablet 0     Sig: Take 1 tablet (25 mg total) by mouth nightly.    • clonazePAM

## 2020-06-22 ENCOUNTER — LAB ENCOUNTER (OUTPATIENT)
Dept: LAB | Facility: REFERENCE LAB | Age: 60
End: 2020-06-22
Attending: FAMILY MEDICINE
Payer: COMMERCIAL

## 2020-06-22 DIAGNOSIS — Z00.00 ANNUAL PHYSICAL EXAM: ICD-10-CM

## 2020-06-22 PROCEDURE — 36415 COLL VENOUS BLD VENIPUNCTURE: CPT

## 2020-06-22 PROCEDURE — 85025 COMPLETE CBC W/AUTO DIFF WBC: CPT

## 2020-06-22 PROCEDURE — 84443 ASSAY THYROID STIM HORMONE: CPT

## 2020-06-22 PROCEDURE — 80053 COMPREHEN METABOLIC PANEL: CPT

## 2020-06-22 PROCEDURE — 80061 LIPID PANEL: CPT

## 2020-06-27 ENCOUNTER — OFFICE VISIT (OUTPATIENT)
Dept: FAMILY MEDICINE CLINIC | Facility: CLINIC | Age: 60
End: 2020-06-27
Payer: COMMERCIAL

## 2020-06-27 VITALS
SYSTOLIC BLOOD PRESSURE: 98 MMHG | WEIGHT: 214 LBS | TEMPERATURE: 97 F | BODY MASS INDEX: 39.38 KG/M2 | DIASTOLIC BLOOD PRESSURE: 61 MMHG | HEIGHT: 62 IN | HEART RATE: 71 BPM

## 2020-06-27 DIAGNOSIS — F41.9 ANXIETY: ICD-10-CM

## 2020-06-27 DIAGNOSIS — E03.9 HYPOTHYROIDISM, UNSPECIFIED TYPE: Primary | ICD-10-CM

## 2020-06-27 PROCEDURE — 99214 OFFICE O/P EST MOD 30 MIN: CPT | Performed by: FAMILY MEDICINE

## 2020-06-27 RX ORDER — LEVOTHYROXINE SODIUM 0.03 MG/1
25 TABLET ORAL
Qty: 90 TABLET | Refills: 1 | Status: SHIPPED | OUTPATIENT
Start: 2020-06-27 | End: 2021-03-13

## 2020-06-27 NOTE — PROGRESS NOTES
HPI:    Patient ID: Jagjit Suresh is a 63year old female. Here for f/u anxiety and blood test- showing hypothyroidism. Otherwise well      Review of Systems   Constitutional: Negative. Negative for activity change, chills, fatigue and fever.    HEN pulses. Pulmonary/Chest: Effort normal and breath sounds normal. No respiratory distress. She has no wheezes. She has no rales. She exhibits no tenderness. Neurological: She has normal reflexes.               ASSESSMENT/PLAN:   Hypothyroidism, unspecifi

## 2020-07-02 ENCOUNTER — TELEPHONE (OUTPATIENT)
Dept: CARDIOLOGY | Age: 60
End: 2020-07-02

## 2020-07-26 ENCOUNTER — APPOINTMENT (OUTPATIENT)
Dept: GENERAL RADIOLOGY | Age: 60
End: 2020-07-26
Attending: FAMILY MEDICINE
Payer: COMMERCIAL

## 2020-07-26 ENCOUNTER — HOSPITAL ENCOUNTER (OUTPATIENT)
Age: 60
Discharge: HOME | End: 2020-07-26
Attending: FAMILY MEDICINE
Payer: COMMERCIAL

## 2020-07-26 VITALS
HEART RATE: 70 BPM | SYSTOLIC BLOOD PRESSURE: 119 MMHG | BODY MASS INDEX: 37.54 KG/M2 | HEIGHT: 62 IN | RESPIRATION RATE: 18 BRPM | TEMPERATURE: 98 F | OXYGEN SATURATION: 100 % | DIASTOLIC BLOOD PRESSURE: 83 MMHG | WEIGHT: 204 LBS

## 2020-07-26 DIAGNOSIS — S60.211A CONTUSION OF RIGHT WRIST, INITIAL ENCOUNTER: ICD-10-CM

## 2020-07-26 DIAGNOSIS — S62.102A CLOSED FRACTURE OF LEFT WRIST, INITIAL ENCOUNTER: ICD-10-CM
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
## 2020-07-26 PROCEDURE — 73090 X-RAY EXAM OF FOREARM: CPT | Performed by: FAMILY MEDICINE

## 2020-07-26 PROCEDURE — A4565 SLINGS: HCPCS | Performed by: FAMILY MEDICINE

## 2020-07-26 PROCEDURE — 99202 OFFICE O/P NEW SF 15 MIN: CPT | Performed by: FAMILY MEDICINE

## 2020-07-26 PROCEDURE — 73110 X-RAY EXAM OF WRIST: CPT | Performed by: FAMILY MEDICINE

## 2020-07-26 PROCEDURE — 29125 APPL SHORT ARM SPLINT STATIC: CPT | Performed by: FAMILY MEDICINE

## 2020-07-26 NOTE — ED PROVIDER NOTES
Patient Seen in: [facility] [address] [place] [address]    History   Patient presents with:  Upper Extremity Injury  Fall    Stated Complaint: bilat arm pain - fall    HPI    HPI: Chang Amato is a 63year old female who presents after an injury Sertraline HCl 50 MG Oral Tab,  Take 3 tablets (150 mg total) by mouth daily.    FENOFIBRATE 160 MG Oral Tab,  TOME GINNY TABLETA POR VIA ORAL TODOS LOS FOSTER       Family History   Problem Relation Age of Onset   • Hypertension Father    • Neurological Disord ([id number])    Result Date: 7/26/2020  CONCLUSION:  Comminuted distal left radial metaphyseal fracture with intra-articular extension.     Dictated by (CST): Catie Bull MD on 7/26/2020 at 3:57 PM     Finalized by (CST): Catie Bull MD on 7/26/2020

## 2020-07-27 DIAGNOSIS — R49.0 HOARSENESS: ICD-10-CM

## 2020-07-27 RX ORDER — LORATADINE 10 MG/1
TABLET ORAL
Qty: 90 TABLET | Refills: 1 | Status: SHIPPED | OUTPATIENT
Start: 2020-07-27 | End: 2021-08-30

## 2020-07-28 RX ORDER — PANTOPRAZOLE SODIUM 40 MG/1
40 TABLET, DELAYED RELEASE ORAL
Qty: 90 TABLET | Refills: 1 | Status: SHIPPED | OUTPATIENT
Start: 2020-07-28 | End: 2021-03-30

## 2020-08-26 ENCOUNTER — TELEPHONE (OUTPATIENT)
Dept: FAMILY MEDICINE CLINIC | Facility: CLINIC | Age: 60
End: 2020-08-26

## 2020-08-26 RX ORDER — FENOFIBRATE 160 MG/1
160 TABLET ORAL DAILY
Qty: 90 TABLET | Refills: 1 | Status: SHIPPED | OUTPATIENT
Start: 2020-08-26 | End: 2021-02-19

## 2020-08-26 NOTE — TELEPHONE ENCOUNTER
FENOFIBRATE 160 MG Oral Tab, TOME GINNY TABLETA POR VIA ORAL TODOS LOS FOSTER, Disp: 90 tablet, Rfl: 11

## 2020-09-08 RX ORDER — QUETIAPINE 25 MG/1
TABLET, FILM COATED ORAL
Qty: 90 TABLET | Refills: 0 | Status: SHIPPED | OUTPATIENT
Start: 2020-09-08 | End: 2020-09-09

## 2020-09-09 RX ORDER — QUETIAPINE 25 MG/1
TABLET, FILM COATED ORAL
Qty: 90 TABLET | Refills: 0 | Status: SHIPPED | OUTPATIENT
Start: 2020-09-09 | End: 2021-02-11

## 2020-09-09 RX ORDER — CLONAZEPAM 0.5 MG/1
0.5 TABLET ORAL 2 TIMES DAILY PRN
Qty: 40 TABLET | Refills: 0 | Status: SHIPPED | OUTPATIENT
Start: 2020-09-09 | End: 2021-06-25

## 2020-09-13 NOTE — PROGRESS NOTES
HPI:    Patient ID: Susanna Boyce is a 63year old female. Here for f/u anxiety. Doing well. Also depression in remission       Review of Systems  Constitutional: Negative. Negative for activity change, appetite change, diaphoresis and fatigue.      R pulses. Pulmonary/Chest: Effort normal and breath sounds normal. No respiratory distress. She has no wheezes. She has no rales. ASSESSMENT/PLAN:   Anxiety-cpm   F/u in 3 months     No orders of the defined types were placed in this encounter.

## 2020-09-17 ENCOUNTER — LAB ENCOUNTER (OUTPATIENT)
Dept: LAB | Age: 60
End: 2020-09-17
Attending: FAMILY MEDICINE
Payer: COMMERCIAL

## 2020-09-17 DIAGNOSIS — E03.9 HYPOTHYROIDISM, UNSPECIFIED TYPE: ICD-10-CM

## 2020-09-17 LAB
T4 FREE SERPL-MCNC: 1.1 NG/DL (ref 0.8–1.7)
TSI SER-ACNC: 13.3 MIU/ML (ref 0.36–3.74)

## 2020-09-17 PROCEDURE — 84439 ASSAY OF FREE THYROXINE: CPT

## 2020-09-17 PROCEDURE — 84443 ASSAY THYROID STIM HORMONE: CPT

## 2020-09-17 PROCEDURE — 36415 COLL VENOUS BLD VENIPUNCTURE: CPT

## 2020-09-24 RX ORDER — LEVOTHYROXINE SODIUM 0.07 MG/1
75 TABLET ORAL
Qty: 90 TABLET | Refills: 0 | Status: SHIPPED | OUTPATIENT
Start: 2020-09-24 | End: 2020-12-18

## 2020-09-24 RX ORDER — LEVOTHYROXINE SODIUM 0.07 MG/1
75 TABLET ORAL
Qty: 90 TABLET | Refills: 0 | Status: SHIPPED | OUTPATIENT
Start: 2020-09-24 | End: 2020-09-24

## 2020-10-22 ENCOUNTER — TELEPHONE (OUTPATIENT)
Dept: FAMILY MEDICINE CLINIC | Facility: CLINIC | Age: 60
End: 2020-10-22

## 2020-10-22 RX ORDER — SIMVASTATIN 20 MG
20 TABLET ORAL NIGHTLY
Qty: 90 TABLET | Refills: 0 | Status: SHIPPED | OUTPATIENT
Start: 2020-10-22 | End: 2021-01-18

## 2020-10-22 NOTE — TELEPHONE ENCOUNTER
Current Outpatient Medications:   •  SIMVASTATIN 20 MG Oral Tab, TAKE 1 TABLET(S) BY MOUTH AT BEDTIME, Disp: 90 tablet, Rfl: 11

## 2020-12-04 ENCOUNTER — LAB ENCOUNTER (OUTPATIENT)
Dept: LAB | Age: 60
End: 2020-12-04
Attending: FAMILY MEDICINE
Payer: COMMERCIAL

## 2020-12-04 ENCOUNTER — TELEPHONE (OUTPATIENT)
Dept: FAMILY MEDICINE CLINIC | Facility: CLINIC | Age: 60
End: 2020-12-04

## 2020-12-04 DIAGNOSIS — E03.9 HYPOTHYROIDISM, UNSPECIFIED TYPE: ICD-10-CM

## 2020-12-04 PROCEDURE — 36415 COLL VENOUS BLD VENIPUNCTURE: CPT

## 2020-12-04 PROCEDURE — 84443 ASSAY THYROID STIM HORMONE: CPT

## 2020-12-04 NOTE — TELEPHONE ENCOUNTER
Pt state had virtual visit with Dr Dawn Mcdonald today and forgot to mention she has a dry patch on her left outer ear that itches. Pt states this happened to her a few years ago and she received an Rx and it cleared it up. Pt denies pain, fever.     Pt aski

## 2020-12-05 RX ORDER — LEVOTHYROXINE SODIUM 0.1 MG/1
100 TABLET ORAL DAILY
Qty: 90 TABLET | Refills: 1 | Status: SHIPPED | OUTPATIENT
Start: 2020-12-05 | End: 2020-12-18

## 2020-12-05 NOTE — PROGRESS NOTES
HPI:    Patient ID: Chang Amato is a 63year old female. Patient here for f/u anxiety . Doing very well with medications.    Mood is controlled,  found great changes and she is happy how is finctioning       Review of Systems  Constitutional: Sodium (SYNTHROID) 25 MCG Oral Tab Take 1 tablet (25 mcg total) by mouth before breakfast. (Patient not taking: Reported on 12/4/2020 ) 90 tablet 1     Allergies:No Known Allergies   PHYSICAL EXAM:   Physical Exam  Physical Exam   Constitutional: She appea

## 2020-12-18 RX ORDER — LEVOTHYROXINE SODIUM 0.07 MG/1
75 TABLET ORAL
Qty: 90 TABLET | Refills: 0 | OUTPATIENT
Start: 2020-12-18

## 2020-12-18 RX ORDER — LEVOTHYROXINE SODIUM 0.1 MG/1
100 TABLET ORAL DAILY
Qty: 90 TABLET | Refills: 1 | Status: SHIPPED | OUTPATIENT
Start: 2020-12-18 | End: 2021-11-24

## 2021-01-18 RX ORDER — SIMVASTATIN 20 MG
20 TABLET ORAL NIGHTLY
Qty: 90 TABLET | Refills: 0 | Status: SHIPPED | OUTPATIENT
Start: 2021-01-18 | End: 2021-04-13

## 2021-01-20 ENCOUNTER — TELEPHONE (OUTPATIENT)
Dept: FAMILY MEDICINE CLINIC | Facility: CLINIC | Age: 61
End: 2021-01-20

## 2021-01-20 NOTE — TELEPHONE ENCOUNTER
Requested Prescriptions     Pending Prescriptions Disp Refills   • Sertraline HCl 50 MG Oral Tab 270 tablet 3     Sig: Take 3 tablets (150 mg total) by mouth daily.      LF:  9/16/19 #270 TAB W/ 3 RF  LOV:  3/16/20   Future Appointments   Date Time Provider Bilirubin      0.1 - 2.0 mg/dL 0.3   TOTAL PROTEIN      6.4 - 8.2 g/dL 8.0   Albumin      3.4 - 5.0 g/dL 3.7   Globulin      2.8 - 4.4 g/dL 4.3   A/G Ratio      1.0 - 2.0 0.9 (L)   Patient Fasting? Yes       ASSESSMENT/PLAN:      1. Osteoarthritis.  Gera Marshall

## 2021-01-21 NOTE — TELEPHONE ENCOUNTER
Response requested:    Patient Requests New RX      Current Outpatient Medications:     SERTRALINE HCL 50 MG TABLET

## 2021-01-21 NOTE — TELEPHONE ENCOUNTER
Refilled 3 months only. Please help her schedule her follow-up appointment. I need to see her back at least yearly. She is therefore due in March of 2021. Thank you.

## 2021-02-11 RX ORDER — QUETIAPINE 25 MG/1
TABLET, FILM COATED ORAL
Qty: 90 TABLET | Refills: 0 | Status: SHIPPED | OUTPATIENT
Start: 2021-02-11 | End: 2021-03-13

## 2021-02-19 RX ORDER — FENOFIBRATE 160 MG/1
TABLET ORAL
Qty: 90 TABLET | Refills: 1 | Status: SHIPPED | OUTPATIENT
Start: 2021-02-19 | End: 2021-11-24

## 2021-03-12 DIAGNOSIS — Z23 NEED FOR VACCINATION: ICD-10-CM

## 2021-03-13 ENCOUNTER — OFFICE VISIT (OUTPATIENT)
Dept: FAMILY MEDICINE CLINIC | Facility: CLINIC | Age: 61
End: 2021-03-13
Payer: COMMERCIAL

## 2021-03-13 VITALS
HEART RATE: 84 BPM | SYSTOLIC BLOOD PRESSURE: 138 MMHG | BODY MASS INDEX: 38.64 KG/M2 | HEIGHT: 62 IN | WEIGHT: 210 LBS | DIASTOLIC BLOOD PRESSURE: 79 MMHG

## 2021-03-13 DIAGNOSIS — F32.A DEPRESSION, UNSPECIFIED DEPRESSION TYPE: ICD-10-CM

## 2021-03-13 DIAGNOSIS — Z12.31 ENCOUNTER FOR SCREENING MAMMOGRAM FOR BREAST CANCER: Primary | ICD-10-CM

## 2021-03-13 DIAGNOSIS — Z12.11 SCREENING FOR COLON CANCER: ICD-10-CM

## 2021-03-13 PROCEDURE — 3075F SYST BP GE 130 - 139MM HG: CPT | Performed by: FAMILY MEDICINE

## 2021-03-13 PROCEDURE — 3008F BODY MASS INDEX DOCD: CPT | Performed by: FAMILY MEDICINE

## 2021-03-13 PROCEDURE — 3078F DIAST BP <80 MM HG: CPT | Performed by: FAMILY MEDICINE

## 2021-03-13 PROCEDURE — 99214 OFFICE O/P EST MOD 30 MIN: CPT | Performed by: FAMILY MEDICINE

## 2021-03-13 RX ORDER — HYDROCORTISONE 25 MG/G
CREAM TOPICAL
Qty: 60 G | Refills: 0 | Status: SHIPPED | OUTPATIENT
Start: 2021-03-13

## 2021-03-13 RX ORDER — QUETIAPINE 50 MG/1
50 TABLET, FILM COATED ORAL NIGHTLY
Qty: 90 TABLET | Refills: 0 | Status: SHIPPED | OUTPATIENT
Start: 2021-03-13 | End: 2021-06-05

## 2021-03-13 NOTE — PROGRESS NOTES
HPI/Subjective:   Patient ID: Tejas Marie is a 57year old female. Here for f/u . Depression not well. Feels more anxious lately  Taking meds. Needs mammogram colonoscopy.    Feels also tired a lot      History/Other:   Review of Systems   Consti by Does not apply route. • Nortriptyline HCl 10 MG Oral Cap Take 1-2 PO Q HS. 180 capsule 3     Allergies:No Known Allergies    Objective:   Physical Exam  Constitutional:       Appearance: She is well-developed.    Cardiovascular:      Rate and Rhythm:

## 2021-03-16 ENCOUNTER — IMMUNIZATION (OUTPATIENT)
Dept: LAB | Facility: HOSPITAL | Age: 61
End: 2021-03-16
Attending: HOSPITALIST
Payer: COMMERCIAL

## 2021-03-16 DIAGNOSIS — Z23 NEED FOR VACCINATION: Primary | ICD-10-CM

## 2021-03-16 PROCEDURE — 0011A SARSCOV2 VAC 100MCG/0.5ML IM: CPT

## 2021-03-30 DIAGNOSIS — R49.0 HOARSENESS: ICD-10-CM

## 2021-03-30 RX ORDER — PANTOPRAZOLE SODIUM 40 MG/1
TABLET, DELAYED RELEASE ORAL
Qty: 90 TABLET | Refills: 1 | Status: SHIPPED | OUTPATIENT
Start: 2021-03-30 | End: 2021-09-05

## 2021-04-13 ENCOUNTER — IMMUNIZATION (OUTPATIENT)
Dept: LAB | Facility: HOSPITAL | Age: 61
End: 2021-04-13
Attending: EMERGENCY MEDICINE
Payer: COMMERCIAL

## 2021-04-13 DIAGNOSIS — Z23 NEED FOR VACCINATION: Primary | ICD-10-CM

## 2021-04-13 PROCEDURE — 0012A SARSCOV2 VAC 100MCG/0.5ML IM: CPT

## 2021-04-13 RX ORDER — SIMVASTATIN 20 MG
20 TABLET ORAL NIGHTLY
Qty: 90 TABLET | Refills: 0 | Status: SHIPPED | OUTPATIENT
Start: 2021-04-13 | End: 2021-07-14

## 2021-04-19 NOTE — TELEPHONE ENCOUNTER
Last filled: 1/20/21 #270 tab with 0 refills   LOV: 3/16/20   Future Appointments   Date Time Provider Robert Crocker   6/4/2021 12:00 PM [facility] RM[facility] EM [facility]       Please advise.

## 2021-05-07 ENCOUNTER — LAB REQUISITION (OUTPATIENT)
Dept: LAB | Facility: HOSPITAL | Age: 61
End: 2021-05-07
Payer: COMMERCIAL

## 2021-05-07 DIAGNOSIS — Z01.818 ENCOUNTER FOR OTHER PREPROCEDURAL EXAMINATION: ICD-10-CM

## 2021-05-28 RX ORDER — ERGOCALCIFEROL 1.25 MG/1
CAPSULE ORAL
Qty: 12 CAPSULE | Refills: 3 | Status: SHIPPED | OUTPATIENT
Start: 2021-05-28

## 2021-06-05 RX ORDER — QUETIAPINE FUMARATE 50 MG/1
50 TABLET, FILM COATED ORAL NIGHTLY
Qty: 20 TABLET | Refills: 0 | Status: SHIPPED | OUTPATIENT
Start: 2021-06-05 | End: 2021-06-25

## 2021-06-07 ENCOUNTER — TELEPHONE (OUTPATIENT)
Dept: FAMILY MEDICINE CLINIC | Facility: CLINIC | Age: 61
End: 2021-06-07

## 2021-06-25 ENCOUNTER — LAB ENCOUNTER (OUTPATIENT)
Dept: LAB | Age: 61
End: 2021-06-25
Attending: FAMILY MEDICINE
Payer: COMMERCIAL

## 2021-06-25 ENCOUNTER — OFFICE VISIT (OUTPATIENT)
Dept: FAMILY MEDICINE CLINIC | Facility: CLINIC | Age: 61
End: 2021-06-25
Payer: COMMERCIAL

## 2021-06-25 VITALS
BODY MASS INDEX: 40.12 KG/M2 | DIASTOLIC BLOOD PRESSURE: 72 MMHG | SYSTOLIC BLOOD PRESSURE: 116 MMHG | HEART RATE: 64 BPM | WEIGHT: 218 LBS | HEIGHT: 62 IN

## 2021-06-25 DIAGNOSIS — Z00.00 ANNUAL PHYSICAL EXAM: ICD-10-CM

## 2021-06-25 DIAGNOSIS — Z00.00 ANNUAL PHYSICAL EXAM: Primary | ICD-10-CM

## 2021-06-25 PROCEDURE — 85025 COMPLETE CBC W/AUTO DIFF WBC: CPT

## 2021-06-25 PROCEDURE — 80053 COMPREHEN METABOLIC PANEL: CPT

## 2021-06-25 PROCEDURE — 84443 ASSAY THYROID STIM HORMONE: CPT

## 2021-06-25 PROCEDURE — 99213 OFFICE O/P EST LOW 20 MIN: CPT | Performed by: FAMILY MEDICINE

## 2021-06-25 PROCEDURE — 3008F BODY MASS INDEX DOCD: CPT | Performed by: FAMILY MEDICINE

## 2021-06-25 PROCEDURE — 3074F SYST BP LT 130 MM HG: CPT | Performed by: FAMILY MEDICINE

## 2021-06-25 PROCEDURE — 36415 COLL VENOUS BLD VENIPUNCTURE: CPT

## 2021-06-25 PROCEDURE — 3078F DIAST BP <80 MM HG: CPT | Performed by: FAMILY MEDICINE

## 2021-06-25 PROCEDURE — 80061 LIPID PANEL: CPT

## 2021-06-25 RX ORDER — QUETIAPINE 50 MG/1
50 TABLET, FILM COATED ORAL NIGHTLY
Qty: 90 TABLET | Refills: 0 | Status: SHIPPED | OUTPATIENT
Start: 2021-06-25 | End: 2021-09-20

## 2021-06-26 ENCOUNTER — HOSPITAL ENCOUNTER (OUTPATIENT)
Dept: MAMMOGRAPHY | Facility: HOSPITAL | Age: 61
Discharge: HOME | End: 2021-06-26
Attending: FAMILY MEDICINE
Payer: COMMERCIAL

## 2021-06-26 DIAGNOSIS — Z12.31 ENCOUNTER FOR SCREENING MAMMOGRAM FOR BREAST CANCER: ICD-10-CM

## 2021-06-26 PROCEDURE — 77067 SCR MAMMO BI INCL CAD: CPT | Performed by: FAMILY MEDICINE

## 2021-06-26 PROCEDURE — 77063 BREAST TOMOSYNTHESIS BI: CPT | Performed by: FAMILY MEDICINE

## 2021-06-26 RX ORDER — LORATADINE 10 MG/1
TABLET ORAL
Qty: 90 TABLET | Refills: 1 | OUTPATIENT
Start: 2021-06-26

## 2021-07-01 NOTE — PROGRESS NOTES
HPI/Subjective:   Patient ID: Sanchez Figueroa is a 57year old female. Patient here for f/u anxiety. Doing quite well. Needs refill on medication       History/Other:   Review of Systems   Constitutional: Positive for fatigue.  Negative for activity ch Exam  Constitutional:       Appearance: She is well-developed. Cardiovascular:      Rate and Rhythm: Normal rate and regular rhythm. Heart sounds: Normal heart sounds. Pulmonary:      Effort: Pulmonary effort is normal. No respiratory distress.

## 2021-07-13 NOTE — TELEPHONE ENCOUNTER
LOV: 3/16/20  Last Refilled:#90, 0rfs  6/25/21    Future Appointments   Date Time Provider Robert Crocker   9/24/2021 11:30 AM Mimi Valles MD [facility]       Please advise.

## 2021-07-14 RX ORDER — SIMVASTATIN 20 MG
20 TABLET ORAL NIGHTLY
Qty: 90 TABLET | Refills: 0 | Status: SHIPPED | OUTPATIENT
Start: 2021-07-14

## 2021-08-19 NOTE — TELEPHONE ENCOUNTER
Unable to reach staff in mammography department ext [phone]. RN called  to be  transferred to a kenxus. Spoke to [place] at [facility].   [place] states there might have been a question regarding test for patient, but test was done and read by Dr. Baylee Camara none

## 2021-08-20 NOTE — TELEPHONE ENCOUNTER
Requested Prescriptions     Pending Prescriptions Disp Refills   • SERTRALINE 50 MG Oral Tab [Pharmacy Med Name: SERTRALINE HCL 50 MG TABLET] 90 tablet 0     Sig: TAKE 3 TABLETS BY MOUTH EVERY DAY     LF: 7/13/21 #90 TAB W/ 0 RF  LOV:  3/16/20   Future Wes - 130 U/L 65   Total Bilirubin      0.1 - 2.0 mg/dL 0.2   TOTAL PROTEIN      6.4 - 8.2 g/dL 7.7   Albumin      3.4 - 5.0 g/dL 3.6   Globulin      2.8 - 4.4 g/dL 4.1   A/G Ratio      1.0 - 2.0 0.9 (L)   Patient Fasting?        No

## 2021-08-30 RX ORDER — LORATADINE 10 MG/1
10 TABLET ORAL DAILY
Qty: 90 TABLET | Refills: 1 | Status: SHIPPED | OUTPATIENT
Start: 2021-08-30

## 2021-08-30 NOTE — TELEPHONE ENCOUNTER
Refill passed per [facility] protocol. Requested Prescriptions   Pending Prescriptions Disp Refills    loratadine 10 MG Oral Tab 90 tablet 1     Sig: Take 1 tablet (10 mg total) by mouth daily.         Allergy Medication Protocol Passed - 8/30/2021

## 2021-09-05 DIAGNOSIS — R49.0 HOARSENESS: ICD-10-CM

## 2021-09-05 RX ORDER — PANTOPRAZOLE SODIUM 40 MG/1
40 TABLET, DELAYED RELEASE ORAL
Qty: 90 TABLET | Refills: 1 | Status: SHIPPED | OUTPATIENT
Start: 2021-09-05 | End: 2022-02-22

## 2021-09-05 NOTE — TELEPHONE ENCOUNTER
Refill passed per [facility] [employer] protocol.     Requested Prescriptions   Pending Prescriptions Disp Refills    PANTOPRAZOLE 40 MG Oral Tab EC [Pharmacy Med Name: PANTOPRAZOLE SOD DR 40 MG TAB] 90 tablet 1     Sig: TOME GINNY TABLETA TODOS LOS FOSTER EN  Trisha Dixon        Gastrointestional Medication Protocol Passed - 9/5/2021  7:13 AM        Passed - Appointment in past 12 or next 3 months              Recent Outpatient Visits              2 months ago Annual physical exam    Byron Davila MD    Office Visit    5 months ago Encounter for screening mammogram for breast cancer    Byron Davila MD    Office Visit    9 months ago [employer], Quang Sepulveda MD    Office Visit    11 months ago [employer], Quang Sepulveda MD    Office Visit    1 year ago Hypothyroidism, unspecified type    Byron Davila MD    Office Visit            Future Appointments         Provider Department Appt Notes    In 2 weeks Martha Zhou MD [employer], [facility], [facility] [place] [facility] m

## 2021-09-20 RX ORDER — QUETIAPINE 50 MG/1
TABLET, FILM COATED ORAL
Qty: 90 TABLET | Refills: 0 | Status: SHIPPED | OUTPATIENT
Start: 2021-09-20 | End: 2021-12-22

## 2021-09-20 NOTE — TELEPHONE ENCOUNTER
Refill passed per [facility] protocol.     Requested Prescriptions   Pending Prescriptions Disp Refills    SERTRALINE 50 MG Oral Tab [Pharmacy Med Name: SERTRALINE HCL 50 MG TABLET] 90 tablet 0     Sig: TAKE 1 TABLET BY MOUTH EVERY DAY        Psychiatr

## 2021-09-20 NOTE — TELEPHONE ENCOUNTER
Medications pass protocol, but warning that nortriptyline and sertraline may cause serotonin syndrome reaction.

## 2021-10-01 ENCOUNTER — OFFICE VISIT (OUTPATIENT)
Dept: FAMILY MEDICINE CLINIC | Facility: CLINIC | Age: 61
End: 2021-10-01
Payer: COMMERCIAL

## 2021-10-01 VITALS
WEIGHT: 215 LBS | HEART RATE: 66 BPM | BODY MASS INDEX: 39.56 KG/M2 | HEIGHT: 62 IN | SYSTOLIC BLOOD PRESSURE: 134 MMHG | DIASTOLIC BLOOD PRESSURE: 82 MMHG

## 2021-10-01 DIAGNOSIS — M12.9 ARTHROPATHY: ICD-10-CM

## 2021-10-01 DIAGNOSIS — G25.0 BENIGN ESSENTIAL TREMOR: Primary | ICD-10-CM

## 2021-10-01 DIAGNOSIS — F31.30 BIPOLAR AFFECTIVE DISORDER, CURRENT EPISODE DEPRESSED, CURRENT EPISODE SEVERITY UNSPECIFIED (HCC): ICD-10-CM

## 2021-10-01 PROCEDURE — 3075F SYST BP GE 130 - 139MM HG: CPT | Performed by: FAMILY MEDICINE

## 2021-10-01 PROCEDURE — 3079F DIAST BP 80-89 MM HG: CPT | Performed by: FAMILY MEDICINE

## 2021-10-01 PROCEDURE — 99214 OFFICE O/P EST MOD 30 MIN: CPT | Performed by: FAMILY MEDICINE

## 2021-10-01 PROCEDURE — 3008F BODY MASS INDEX DOCD: CPT | Performed by: FAMILY MEDICINE

## 2021-10-01 RX ORDER — MELOXICAM 7.5 MG/1
7.5 TABLET ORAL DAILY
Qty: 30 TABLET | Refills: 1 | Status: SHIPPED | OUTPATIENT
Start: 2021-10-01 | End: 2021-11-30

## 2021-10-01 RX ORDER — PROPRANOLOL HYDROCHLORIDE 20 MG/1
20 TABLET ORAL 3 TIMES DAILY
Qty: 60 TABLET | Refills: 1 | Status: SHIPPED | OUTPATIENT
Start: 2021-10-01

## 2021-10-01 NOTE — PROGRESS NOTES
Subjective:   Patient ID: Julian Wade is a 57year old female. Has tremor benign most of the times. Mostly in her hands. Bipolar stable -had a problem with agression-feels well with that now. Has pain in both hands on and off. Has oA.    Yoana Lee Cap Take 1-2 PO Q HS. 180 capsule 3   • Hydrocortisone (ANUSOL-HC) 2.5 % External Cream Apply tid 60 g 0   • Nebulizer Does not apply Misc by Does not apply route.        Allergies:No Known Allergies    Objective:   Physical Exam  Constitutional:       Appe

## 2021-11-07 ENCOUNTER — IMMUNIZATION (OUTPATIENT)
Dept: LAB | Facility: HOSPITAL | Age: 61
End: 2021-11-07
Attending: EMERGENCY MEDICINE
Payer: COMMERCIAL

## 2021-11-07 DIAGNOSIS — Z23 NEED FOR VACCINATION: Primary | ICD-10-CM

## 2021-11-07 PROCEDURE — 0064A SARSCOV2 VAC 50MCG/0.25ML IM: CPT

## 2021-11-24 RX ORDER — FENOFIBRATE 160 MG/1
TABLET ORAL
Qty: 90 TABLET | Refills: 1 | Status: SHIPPED | OUTPATIENT
Start: 2021-11-24

## 2021-11-24 RX ORDER — LEVOTHYROXINE SODIUM 0.1 MG/1
100 TABLET ORAL DAILY
Qty: 90 TABLET | Refills: 1 | Status: SHIPPED | OUTPATIENT
Start: 2021-11-24

## 2021-11-24 NOTE — TELEPHONE ENCOUNTER
Refill passed per [facility] protocol.    Requested Prescriptions   Pending Prescriptions Disp Refills    FENOFIBRATE 160 MG Oral Tab [Pharmacy Med Name: FENOFIBRATE 160 MG TABLET] 90 tablet 1     Sig: TOME GINNY TABLETA TODOS LOS FOSTER        Cholesterol

## 2021-11-30 RX ORDER — MELOXICAM 7.5 MG/1
TABLET ORAL
Qty: 30 TABLET | Refills: 1 | Status: SHIPPED | OUTPATIENT
Start: 2021-11-30

## 2021-12-22 NOTE — TELEPHONE ENCOUNTER
Refill passed per [facility] protocol.  Rn unable to approve due to medication interaction   Requested Prescriptions   Pending Prescriptions Disp Refills    SERTRALINE 50 MG Oral Tab [Pharmacy Med Name: SERTRALINE HCL 50 MG TABLET] 90 tablet 0     Sig:

## 2021-12-24 RX ORDER — QUETIAPINE 50 MG/1
50 TABLET, FILM COATED ORAL NIGHTLY
Qty: 90 TABLET | Refills: 0 | Status: SHIPPED | OUTPATIENT
Start: 2021-12-24

## 2021-12-28 ENCOUNTER — OFFICE VISIT (OUTPATIENT)
Dept: FAMILY MEDICINE CLINIC | Facility: CLINIC | Age: 61
End: 2021-12-28
Payer: COMMERCIAL

## 2021-12-28 VITALS
DIASTOLIC BLOOD PRESSURE: 75 MMHG | HEIGHT: 62 IN | TEMPERATURE: 98 F | SYSTOLIC BLOOD PRESSURE: 145 MMHG | WEIGHT: 221 LBS | HEART RATE: 67 BPM | BODY MASS INDEX: 40.67 KG/M2

## 2021-12-28 DIAGNOSIS — M19.90 ARTHRITIS: Primary | ICD-10-CM

## 2021-12-28 DIAGNOSIS — F31.9 BIPOLAR 1 DISORDER (HCC): ICD-10-CM

## 2021-12-28 DIAGNOSIS — R32 URINARY INCONTINENCE, UNSPECIFIED TYPE: ICD-10-CM

## 2021-12-28 DIAGNOSIS — R25.1 TREMOR: ICD-10-CM

## 2021-12-28 PROCEDURE — 3078F DIAST BP <80 MM HG: CPT | Performed by: FAMILY MEDICINE

## 2021-12-28 PROCEDURE — 3008F BODY MASS INDEX DOCD: CPT | Performed by: FAMILY MEDICINE

## 2021-12-28 PROCEDURE — 3077F SYST BP >= 140 MM HG: CPT | Performed by: FAMILY MEDICINE

## 2021-12-28 PROCEDURE — 99214 OFFICE O/P EST MOD 30 MIN: CPT | Performed by: FAMILY MEDICINE

## 2021-12-28 RX ORDER — PROPRANOLOL HYDROCHLORIDE 40 MG/1
40 TABLET ORAL 2 TIMES DAILY
Qty: 180 TABLET | Refills: 1 | Status: SHIPPED | OUTPATIENT
Start: 2021-12-28

## 2021-12-29 NOTE — PROGRESS NOTES
Subjective:   Patient ID: Dave Kate is a 57year old female. Complaining about back pain   Also a lot of hand pain   Saw rheumatologist few years ago. Hands always feel hot.    Depression /bipolar controlled and better   Tremor slightly better Cap Take 1-2 PO Q HS. 180 capsule 3   • QUEtiapine 50 MG Oral Tab Take 1 tablet (50 mg total) by mouth nightly.  (Patient not taking: Reported on 12/28/2021) 90 tablet 0   • propranolol 20 MG Oral Tab Take 1 tablet (20 mg total) by mouth 3 (three) times jw

## 2022-02-03 ENCOUNTER — TELEPHONE (OUTPATIENT)
Dept: FAMILY MEDICINE CLINIC | Facility: CLINIC | Age: 62
End: 2022-02-03

## 2022-02-22 RX ORDER — LORATADINE 10 MG/1
TABLET ORAL
Qty: 90 TABLET | Refills: 1 | Status: SHIPPED | OUTPATIENT
Start: 2022-02-22

## 2022-02-22 RX ORDER — PANTOPRAZOLE SODIUM 40 MG/1
40 TABLET, DELAYED RELEASE ORAL
Qty: 90 TABLET | Refills: 1 | Status: SHIPPED | OUTPATIENT
Start: 2022-02-22

## 2022-02-22 NOTE — TELEPHONE ENCOUNTER
Refill passed per [employer] protocol.   Requested Prescriptions   Pending Prescriptions Disp Refills    LORATADINE 10 MG Oral Tab [Pharmacy Med Name: LORATADINE 10 MG TABLET] 90 tablet 1     Sig: TAKE 1 TABLET BY MOUTH EVERY DAY        Allergy Medication Protocol Passed - 2/22/2022 12:12 AM        Passed - Appoinment in past 12 or next 3 months           PANTOPRAZOLE 40 MG Oral Tab EC [Pharmacy Med Name: PANTOPRAZOLE SOD DR 40 MG TAB] 90 tablet 1     Sig: TAKE 1 TABLET BY MOUTH BEFORE BREAKFAST        Gastrointestional Medication Protocol Passed - 2/22/2022 12:12 AM        Passed - Appointment in past 12 or next 3 months            Recent Outpatient Visits              1 month ago Yael Ma MD    Office Visit    4 months ago Benign essential tremor    Anabell Hartmann MD    Office Visit    8 months ago Annual physical exam    Anabell Hartmann MD    Office Visit    11 months ago Encounter for screening mammogram for breast cancer    Anabell Hartmann MD    Office Visit    1 year ago Gergorio Cooper MD    Office Visit          Future Appointments         Provider Department Appt Notes    In 1 month Mimi Valles MD [employer], Ori 2 months follow up

## 2022-03-23 RX ORDER — QUETIAPINE FUMARATE 50 MG/1
50 TABLET, FILM COATED ORAL NIGHTLY
Qty: 90 TABLET | Refills: 0 | Status: SHIPPED | OUTPATIENT
Start: 2022-03-23

## 2022-03-23 NOTE — TELEPHONE ENCOUNTER
Please advise on refill request due to Nortriptyline and Meloxicam    Drug-Drug: sertraline and nortriptyline  The plasma concentrations and pharmacologic effects of tricyclic antidepressants (TCAs) may be increased by sertraline. Additive serotonergic effects may also occur during coadministration of these agents, and the risk of developing serotonin syndrome may be increased.       Requested Prescriptions   Pending Prescriptions Disp Refills    SERTRALINE 50 MG Oral Tab [Pharmacy Med Name: SERTRALINE HCL 50 MG TABLET] 90 tablet 0     Sig: TAKE 1 TABLET BY MOUTH EVERY DAY        Psychiatric Non-Scheduled (Anti-Anxiety) Passed - 3/22/2022 12:09 AM        Passed - Appointment in last 6 or next 3 months           QUETIAPINE 50 MG Oral Tab [Pharmacy Med Name: QUETIAPINE FUMARATE 50 MG TAB] 90 tablet 0     Sig: TAKE 1 TABLET BY MOUTH EVERY DAY AT NIGHT        Psychiatric Non-Scheduled (Anti-Anxiety) Passed - 3/22/2022 12:09 AM        Passed - Appointment in last 6 or next 3 months              Recent Outpatient Visits              2 months ago Liam Enrique MD    Office Visit    5 months ago Benign essential tremor    Brenda Yi MD    Office Visit    9 months ago Annual physical exam    Brenda Yi MD    Office Visit    1 year ago Encounter for screening mammogram for breast cancer    Brenda Yi MD    Office Visit    1 year ago Ceferino Machuca MD    Office Visit            Future Appointments         Provider Department Appt Notes    In 1 week Josué Flowers MD [facility], [facility], [facility] 2 months follow up

## 2022-03-29 ENCOUNTER — OFFICE VISIT (OUTPATIENT)
Dept: FAMILY MEDICINE CLINIC | Facility: CLINIC | Age: 62
End: 2022-03-29
Payer: COMMERCIAL

## 2022-03-29 VITALS
BODY MASS INDEX: 41.96 KG/M2 | DIASTOLIC BLOOD PRESSURE: 74 MMHG | SYSTOLIC BLOOD PRESSURE: 122 MMHG | HEIGHT: 62 IN | HEART RATE: 66 BPM | WEIGHT: 228 LBS

## 2022-03-29 DIAGNOSIS — H10.12 ACUTE ALLERGIC CONJUNCTIVITIS OF LEFT EYE: ICD-10-CM

## 2022-03-29 DIAGNOSIS — F31.9 BIPOLAR 1 DISORDER (HCC): ICD-10-CM

## 2022-03-29 DIAGNOSIS — Z12.39 ENCOUNTER FOR SCREENING FOR MALIGNANT NEOPLASM OF BREAST, UNSPECIFIED SCREENING MODALITY: Primary | ICD-10-CM

## 2022-03-29 DIAGNOSIS — E66.9 OBESITY WITHOUT SERIOUS COMORBIDITY, UNSPECIFIED CLASSIFICATION, UNSPECIFIED OBESITY TYPE: ICD-10-CM

## 2022-03-29 DIAGNOSIS — R25.1 TREMOR: ICD-10-CM

## 2022-03-29 PROCEDURE — 3074F SYST BP LT 130 MM HG: CPT | Performed by: FAMILY MEDICINE

## 2022-03-29 PROCEDURE — 99214 OFFICE O/P EST MOD 30 MIN: CPT | Performed by: FAMILY MEDICINE

## 2022-03-29 PROCEDURE — 3078F DIAST BP <80 MM HG: CPT | Performed by: FAMILY MEDICINE

## 2022-03-29 PROCEDURE — 3008F BODY MASS INDEX DOCD: CPT | Performed by: FAMILY MEDICINE

## 2022-03-29 RX ORDER — OLOPATADINE HYDROCHLORIDE 1 MG/ML
1 SOLUTION/ DROPS OPHTHALMIC 2 TIMES DAILY
Qty: 1 EACH | Refills: 0 | Status: SHIPPED | OUTPATIENT
Start: 2022-03-29

## 2022-03-29 RX ORDER — TRIAMCINOLONE ACETONIDE 0.25 MG/G
CREAM TOPICAL
Qty: 45 G | Refills: 0 | Status: SHIPPED | OUTPATIENT
Start: 2022-03-29

## 2022-04-23 RX ORDER — OLOPATADINE HYDROCHLORIDE 1 MG/ML
1 SOLUTION/ DROPS OPHTHALMIC EVERY 12 HOURS
Qty: 5 ML | Refills: 0 | Status: SHIPPED | OUTPATIENT
Start: 2022-04-23

## 2022-05-07 ENCOUNTER — NURSE TRIAGE (OUTPATIENT)
Dept: FAMILY MEDICINE CLINIC | Facility: CLINIC | Age: 62
End: 2022-05-07

## 2022-05-09 RX ORDER — OLOPATADINE HYDROCHLORIDE 1 MG/ML
SOLUTION/ DROPS OPHTHALMIC
Qty: 5 ML | Refills: 0 | OUTPATIENT
Start: 2022-05-09

## 2022-05-09 NOTE — TELEPHONE ENCOUNTER
First Call attempt. Left Voicemail to call back our office. Office phone number provided with office hours. 4/23 - eye drops prescribed, initially.

## 2022-05-15 NOTE — TELEPHONE ENCOUNTER
Please review. Protocol Failed or has No Protocol.     Requested Prescriptions   Pending Prescriptions Disp Refills    ERGOCALCIFEROL 1.25 MG (36688 UT) Oral Cap [Pharmacy Med Name: VITAMIN D2 1.25MG(50,000 UNIT)] 12 capsule 3     Sig: TOME GINNY CAPSULA POR VIA ORAL GINNY VES POR SEMANA        There is no refill protocol information for this order           Future Appointments         Provider Department Appt Notes    In 1 month Vivi Ceron MD [facility], [facility], [facility] follow up    In 2 months Aleksandr Humphrey MD [address]            Recent Outpatient Visits              1 month ago Encounter for screening for malignant neoplasm of breast, unspecified screening modality    Marcial Rascon MD    Office Visit    4 months ago Angella Allan MD    Office Visit    7 months ago Benign essential tremor    Marcial Rascon MD    Office Visit    10 months ago Annual physical exam    Marcial Rascon MD    Office Visit    1 year ago Encounter for screening mammogram for breast cancer    Marcial Rascon MD    Office Visit

## 2022-05-16 RX ORDER — ERGOCALCIFEROL 1.25 MG/1
CAPSULE ORAL
Qty: 12 CAPSULE | Refills: 3 | Status: SHIPPED | OUTPATIENT
Start: 2022-05-16

## 2022-05-16 RX ORDER — OLOPATADINE HYDROCHLORIDE 1 MG/ML
1 SOLUTION/ DROPS OPHTHALMIC EVERY 12 HOURS
Qty: 5 ML | Refills: 0 | Status: CANCELLED | OUTPATIENT
Start: 2022-05-16

## 2022-05-16 NOTE — TELEPHONE ENCOUNTER
Patient is calling for medication for her eyes. Per patient she was advised it would be sent to her pharmacy but nothing was sent. Patient currently is using Olopatadine Ophthalmic and at the end of the phone call she advised she doesn't need anything yet but if she does she will call us back.

## 2022-05-18 ENCOUNTER — TELEPHONE (OUTPATIENT)
Dept: FAMILY MEDICINE CLINIC | Facility: CLINIC | Age: 62
End: 2022-05-18

## 2022-05-18 NOTE — TELEPHONE ENCOUNTER
Patient came to clinic to request letter for [employer] duty to be excuse, [place] date 6-6-22. Patient was informed Dr Rey Slater would not be in the office until next week. Please call when letter is ready would like it by next week to send the form in.

## 2022-05-18 NOTE — TELEPHONE ENCOUNTER
Patient states she was called for [facility] Duty and would like to know if Dr. Olga Alejo would be able to excuse her. 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 Patient will be dropping off forms for completion. Please advise.

## 2022-05-21 RX ORDER — FENOFIBRATE 160 MG/1
TABLET ORAL
Qty: 90 TABLET | Refills: 1 | Status: SHIPPED | OUTPATIENT
Start: 2022-05-21

## 2022-05-21 RX ORDER — LEVOTHYROXINE SODIUM 0.1 MG/1
TABLET ORAL
Qty: 90 TABLET | Refills: 1 | Status: SHIPPED | OUTPATIENT
Start: 2022-05-21

## 2022-05-21 NOTE — TELEPHONE ENCOUNTER
Refill passed per [address] [facility] protocol.     Requested Prescriptions   Pending Prescriptions Disp Refills    FENOFIBRATE 160 MG Oral Tab [Pharmacy Med Name: FENOFIBRATE 160 MG TABLET] 90 tablet 1     Sig: TAKE 1 TABLET BY MOUTH EVERY DAY        Cholesterol Medication Protocol Passed - 5/21/2022  7:34 AM        Passed - ALT in past 12 months        Passed - LDL in past 12 months        Passed - Last ALT < 80       Lab Results   Component Value Date    ALT 24 06/25/2021             Passed - Last LDL < 130     Lab Results   Component Value Date    LDL 99 06/25/2021               Passed - Appointment in past 12 or next 3 months           LEVOTHYROXINE 100 MCG Oral Tab [Pharmacy Med Name: LEVOTHYROXINE 100 MCG TABLET] 90 tablet 1     Sig: TAKE 1 TABLET BY MOUTH EVERY DAY        Thyroid Medication Protocol Passed - 5/21/2022  7:34 AM        Passed - TSH in past 12 months        Passed - Last TSH value is normal     Lab Results   Component Value Date    TSH 0.568 06/25/2021                 Passed - Appointment in past 12 or next 3 months              Recent Outpatient Visits              1 month ago Encounter for screening for malignant neoplasm of breast, unspecified screening modality    Kyree Freedman MD    Office Visit    4 months ago [address] [place] [address], Jermaine Posadas MD    Office Visit    7 months ago Benign essential tremor    Kyree Freedman MD    Office Visit    11 months ago Annual physical exam    Kyree Freedman MD    Office Visit    1 year ago Encounter for screening mammogram for breast cancer    [address], [address] [place], [place] [address]Jermaine MD    Office Visit            Future Appointments         Provider Department Appt Notes    In 3 weeks Concepcion Hanson MD [address] follow up    In 1 month Ai Uma Gallardo, [address] [place] [address] PPO  NON SX CONSULT

## 2022-05-23 NOTE — TELEPHONE ENCOUNTER
Patient would like the letter printed and will pick it up this afternoon.      [facility] staff please assist.

## 2022-05-24 NOTE — TELEPHONE ENCOUNTER
Called pt name and date of birth verified per pt letter was picked up yesterday, no further questions

## 2022-05-27 RX ORDER — SIMVASTATIN 20 MG
20 TABLET ORAL NIGHTLY
Qty: 90 TABLET | Refills: 1 | Status: SHIPPED | OUTPATIENT
Start: 2022-05-27

## 2022-05-27 NOTE — TELEPHONE ENCOUNTER
Refill passed per [facility] protocol    Requested Prescriptions   Pending Prescriptions Disp Refills    SIMVASTATIN 20 MG Oral Tab [Pharmacy Med Name: SIMVASTATIN 20 MG TABLET] 90 tablet 0     Sig: TAKE 1 TABLET (20 MG TOTAL) BY MOUTH NIGHTLY.  AT BEDTIME        Cholesterol Medication Protocol Passed - 5/27/2022  2:51 PM        Passed - ALT in past 12 months        Passed - LDL in past 12 months        Passed - Last ALT < 80       Lab Results   Component Value Date    ALT 24 06/25/2021             Passed - Last LDL < 130     Lab Results   Component Value Date    LDL 99 06/25/2021               Passed - Appointment in past 12 or next 3 months              Future Appointments         Provider Department Appt Notes    In 2 weeks Silvia Luther MD [facility], Ori follow up    In 1 month Mal Tai, [facility], [address] [place] [address],[address], [address] [facility] [address] Visits              1 month ago Encounter for screening for malignant neoplasm of breast, unspecified screening modality    Terra Tsang MD    Office Visit    5 months ago Grazyna Lindsay MD    Office Visit    7 months ago Benign essential tremor    Terra Tsang MD    Office Visit    11 months ago Annual physical exam    Terra Tsang MD    Office Visit    1 year ago Encounter for screening mammogram for breast cancer    Terra Tsang MD    Office Visit

## 2022-06-07 ENCOUNTER — TELEPHONE (OUTPATIENT)
Dept: FAMILY MEDICINE CLINIC | Facility: CLINIC | Age: 62
End: 2022-06-07

## 2022-06-07 NOTE — TELEPHONE ENCOUNTER
Per daughter not on LORETO patient's dad  on  and patient is not doing well and the family is concerned. Patient is not sleeping, her blood pressure is elevated for the past two days in the 160's, headache in the back of the head. per daughter patient is unable to speak due to crying, and is very emotional. Daughter wants to know if there is a medication that can be prescribed. Instructed daughter if s/sx worsen to take patient to urgent care or ED.

## 2022-06-08 ENCOUNTER — TELEPHONE (OUTPATIENT)
Dept: FAMILY MEDICINE CLINIC | Facility: CLINIC | Age: 62
End: 2022-06-08

## 2022-06-08 NOTE — TELEPHONE ENCOUNTER
I received a call from pt niece Inocencia Roland. She stated that pt dad passed away and she is having a hard time and pt needs something to help her calm down. Also she stated that her blood pressure is a little elevated. Like in the 160/78. Inocencia Roland was informed if her blood pressure gets high and she develops a headache blurry vision,chest pain or sob to please take her to ER. She verbalized understanding. Inocencia Roland stated that pt  has a appt to see you at 9:45 am and wondering if pt can be added to this appt as  can then drive pt to the appt.  do you approve? Trista aware this will be addressed in the morning as Dr. Darlene Flynn is not in the office at this time.

## 2022-06-15 ENCOUNTER — LAB ENCOUNTER (OUTPATIENT)
Dept: LAB | Age: 62
End: 2022-06-15
Attending: FAMILY MEDICINE
Payer: COMMERCIAL

## 2022-06-15 DIAGNOSIS — Z00.00 ANNUAL PHYSICAL EXAM: ICD-10-CM

## 2022-06-15 LAB
ALBUMIN SERPL-MCNC: 3.6 G/DL (ref 3.4–5)
ALBUMIN/GLOB SERPL: 1 {RATIO} (ref 1–2)
ALP LIVER SERPL-CCNC: 45 U/L
ALT SERPL-CCNC: 26 U/L
ANION GAP SERPL CALC-SCNC: 7 MMOL/L (ref 0–18)
AST SERPL-CCNC: 19 U/L (ref 15–37)
BASOPHILS # BLD AUTO: 0.07 X10(3) UL (ref 0–0.2)
BASOPHILS NFR BLD AUTO: 0.8 %
BILIRUB SERPL-MCNC: 0.3 MG/DL (ref 0.1–2)
BUN BLD-MCNC: 24 MG/DL (ref 7–18)
BUN/CREAT SERPL: 32.4 (ref 10–20)
CALCIUM BLD-MCNC: 9.4 MG/DL (ref 8.5–10.1)
CHLORIDE SERPL-SCNC: 105 MMOL/L (ref 98–112)
CHOLEST SERPL-MCNC: 181 MG/DL (ref ?–200)
CO2 SERPL-SCNC: 28 MMOL/L (ref 21–32)
CREAT BLD-MCNC: 0.74 MG/DL
DEPRECATED RDW RBC AUTO: 42.2 FL (ref 35.1–46.3)
EOSINOPHIL # BLD AUTO: 0.14 X10(3) UL (ref 0–0.7)
EOSINOPHIL NFR BLD AUTO: 1.5 %
ERYTHROCYTE [DISTWIDTH] IN BLOOD BY AUTOMATED COUNT: 13.2 % (ref 11–15)
FASTING PATIENT LIPID ANSWER: YES
FASTING STATUS PATIENT QL REPORTED: YES
GLOBULIN PLAS-MCNC: 3.7 G/DL (ref 2.8–4.4)
GLUCOSE BLD-MCNC: 94 MG/DL (ref 70–99)
HCT VFR BLD AUTO: 37.2 %
HDLC SERPL-MCNC: 55 MG/DL (ref 40–59)
HGB BLD-MCNC: 11.6 G/DL
IMM GRANULOCYTES # BLD AUTO: 0.05 X10(3) UL (ref 0–1)
IMM GRANULOCYTES NFR BLD: 0.5 %
LDLC SERPL CALC-MCNC: 99 MG/DL (ref ?–100)
LYMPHOCYTES # BLD AUTO: 3.19 X10(3) UL (ref 1–4)
LYMPHOCYTES NFR BLD AUTO: 34.6 %
MCH RBC QN AUTO: 27.4 PG (ref 26–34)
MCHC RBC AUTO-ENTMCNC: 31.2 G/DL (ref 31–37)
MCV RBC AUTO: 87.9 FL
MONOCYTES # BLD AUTO: 0.6 X10(3) UL (ref 0.1–1)
MONOCYTES NFR BLD AUTO: 6.5 %
NEUTROPHILS # BLD AUTO: 5.18 X10 (3) UL (ref 1.5–7.7)
NEUTROPHILS # BLD AUTO: 5.18 X10(3) UL (ref 1.5–7.7)
NEUTROPHILS NFR BLD AUTO: 56.1 %
NONHDLC SERPL-MCNC: 126 MG/DL (ref ?–130)
OSMOLALITY SERPL CALC.SUM OF ELEC: 294 MOSM/KG (ref 275–295)
PLATELET # BLD AUTO: 310 10(3)UL (ref 150–450)
POTASSIUM SERPL-SCNC: 4 MMOL/L (ref 3.5–5.1)
PROT SERPL-MCNC: 7.3 G/DL (ref 6.4–8.2)
RBC # BLD AUTO: 4.23 X10(6)UL
SODIUM SERPL-SCNC: 140 MMOL/L (ref 136–145)
TRIGL SERPL-MCNC: 158 MG/DL (ref 30–149)
TSI SER-ACNC: 2.7 MIU/ML (ref 0.36–3.74)
VLDLC SERPL CALC-MCNC: 26 MG/DL (ref 0–30)
WBC # BLD AUTO: 9.2 X10(3) UL (ref 4–11)

## 2022-06-15 PROCEDURE — 84443 ASSAY THYROID STIM HORMONE: CPT

## 2022-06-15 PROCEDURE — 80061 LIPID PANEL: CPT

## 2022-06-15 PROCEDURE — 36415 COLL VENOUS BLD VENIPUNCTURE: CPT

## 2022-06-15 PROCEDURE — 80053 COMPREHEN METABOLIC PANEL: CPT

## 2022-06-15 PROCEDURE — 85025 COMPLETE CBC W/AUTO DIFF WBC: CPT

## 2022-06-21 RX ORDER — LORATADINE 10 MG/1
TABLET ORAL
Qty: 90 TABLET | Refills: 1 | Status: SHIPPED | OUTPATIENT
Start: 2022-06-21

## 2022-06-25 RX ORDER — PROPRANOLOL HYDROCHLORIDE 40 MG/1
TABLET ORAL
Qty: 180 TABLET | Refills: 1 | Status: SHIPPED | OUTPATIENT
Start: 2022-06-25

## 2022-07-05 ENCOUNTER — HOSPITAL ENCOUNTER (EMERGENCY)
Facility: HOSPITAL | Age: 62
Discharge: HOME | End: 2022-07-05
Attending: EMERGENCY MEDICINE
Payer: COMMERCIAL

## 2022-07-05 ENCOUNTER — APPOINTMENT (OUTPATIENT)
Dept: GENERAL RADIOLOGY | Facility: HOSPITAL | Age: 62
End: 2022-07-05
Payer: COMMERCIAL

## 2022-07-05 VITALS
DIASTOLIC BLOOD PRESSURE: 59 MMHG | SYSTOLIC BLOOD PRESSURE: 120 MMHG | HEART RATE: 58 BPM | OXYGEN SATURATION: 91 % | TEMPERATURE: 98 F | RESPIRATION RATE: 20 BRPM

## 2022-07-05 DIAGNOSIS — R07.9 CHEST PAIN OF UNCERTAIN ETIOLOGY: Primary | ICD-10-CM

## 2022-07-05 LAB
ANION GAP SERPL CALC-SCNC: 8 MMOL/L (ref 0–18)
BASOPHILS # BLD AUTO: 0.04 X10(3) UL (ref 0–0.2)
BASOPHILS NFR BLD AUTO: 0.3 %
BUN BLD-MCNC: 26 MG/DL (ref 7–18)
BUN/CREAT SERPL: 24.8 (ref 10–20)
CALCIUM BLD-MCNC: 8.9 MG/DL (ref 8.5–10.1)
CHLORIDE SERPL-SCNC: 105 MMOL/L (ref 98–112)
CO2 SERPL-SCNC: 28 MMOL/L (ref 21–32)
CREAT BLD-MCNC: 1.05 MG/DL
DEPRECATED RDW RBC AUTO: 43.2 FL (ref 35.1–46.3)
EOSINOPHIL # BLD AUTO: 0.11 X10(3) UL (ref 0–0.7)
EOSINOPHIL NFR BLD AUTO: 1 %
ERYTHROCYTE [DISTWIDTH] IN BLOOD BY AUTOMATED COUNT: 13.5 % (ref 11–15)
GLUCOSE BLD-MCNC: 130 MG/DL (ref 70–99)
HCT VFR BLD AUTO: 35 %
HGB BLD-MCNC: 10.9 G/DL
IMM GRANULOCYTES # BLD AUTO: 0.07 X10(3) UL (ref 0–1)
IMM GRANULOCYTES NFR BLD: 0.6 %
LYMPHOCYTES # BLD AUTO: 1.53 X10(3) UL (ref 1–4)
LYMPHOCYTES NFR BLD AUTO: 13.2 %
MCH RBC QN AUTO: 27.3 PG (ref 26–34)
MCHC RBC AUTO-ENTMCNC: 31.1 G/DL (ref 31–37)
MCV RBC AUTO: 87.7 FL
MONOCYTES # BLD AUTO: 0.22 X10(3) UL (ref 0.1–1)
MONOCYTES NFR BLD AUTO: 1.9 %
NEUTROPHILS # BLD AUTO: 9.59 X10 (3) UL (ref 1.5–7.7)
NEUTROPHILS # BLD AUTO: 9.59 X10(3) UL (ref 1.5–7.7)
NEUTROPHILS NFR BLD AUTO: 83 %
OSMOLALITY SERPL CALC.SUM OF ELEC: 299 MOSM/KG (ref 275–295)
PLATELET # BLD AUTO: 285 10(3)UL (ref 150–450)
POTASSIUM SERPL-SCNC: 4.6 MMOL/L (ref 3.5–5.1)
RBC # BLD AUTO: 3.99 X10(6)UL
SODIUM SERPL-SCNC: 141 MMOL/L (ref 136–145)
TROPONIN I HIGH SENSITIVITY: 3 NG/L
WBC # BLD AUTO: 11.6 X10(3) UL (ref 4–11)

## 2022-07-05 PROCEDURE — 99284 EMERGENCY DEPT VISIT MOD MDM: CPT

## 2022-07-05 PROCEDURE — 93010 ELECTROCARDIOGRAM REPORT: CPT | Performed by: EMERGENCY MEDICINE

## 2022-07-05 PROCEDURE — 36415 COLL VENOUS BLD VENIPUNCTURE: CPT

## 2022-07-05 PROCEDURE — 71045 X-RAY EXAM CHEST 1 VIEW: CPT

## 2022-07-05 PROCEDURE — 85025 COMPLETE CBC W/AUTO DIFF WBC: CPT

## 2022-07-05 PROCEDURE — 84484 ASSAY OF TROPONIN QUANT: CPT

## 2022-07-05 PROCEDURE — 93005 ELECTROCARDIOGRAM TRACING: CPT

## 2022-07-05 PROCEDURE — 80048 BASIC METABOLIC PNL TOTAL CA: CPT

## 2022-07-05 RX ORDER — MAGNESIUM HYDROXIDE/ALUMINUM HYDROXICE/SIMETHICONE 120; 1200; 1200 MG/30ML; MG/30ML; MG/30ML
30 SUSPENSION ORAL ONCE
Status: COMPLETED | OUTPATIENT
Start: 2022-07-05 | End: 2022-07-05

## 2022-07-05 RX ORDER — ACETAMINOPHEN 500 MG
1000 TABLET ORAL ONCE
Status: COMPLETED | OUTPATIENT
Start: 2022-07-05 | End: 2022-07-05

## 2022-07-05 NOTE — ED INITIAL ASSESSMENT (HPI)
Patient arrives ambulatory through triage with c/o chest pain for a week, left sided neck for 2 weeks, and fatigue that began this morning.

## 2022-07-12 ENCOUNTER — OFFICE VISIT (OUTPATIENT)
Dept: FAMILY MEDICINE CLINIC | Facility: CLINIC | Age: 62
End: 2022-07-12
Payer: COMMERCIAL

## 2022-07-12 VITALS
HEART RATE: 65 BPM | DIASTOLIC BLOOD PRESSURE: 71 MMHG | SYSTOLIC BLOOD PRESSURE: 128 MMHG | WEIGHT: 230 LBS | BODY MASS INDEX: 42.33 KG/M2 | HEIGHT: 62 IN

## 2022-07-12 DIAGNOSIS — F32.89 OTHER DEPRESSION: ICD-10-CM

## 2022-07-12 DIAGNOSIS — R07.89 CHEST DISCOMFORT: ICD-10-CM

## 2022-07-12 DIAGNOSIS — N39.41 URGE INCONTINENCE OF URINE: Primary | ICD-10-CM

## 2022-07-12 PROCEDURE — 3078F DIAST BP <80 MM HG: CPT | Performed by: FAMILY MEDICINE

## 2022-07-12 PROCEDURE — 3008F BODY MASS INDEX DOCD: CPT | Performed by: FAMILY MEDICINE

## 2022-07-12 PROCEDURE — 3074F SYST BP LT 130 MM HG: CPT | Performed by: FAMILY MEDICINE

## 2022-07-12 PROCEDURE — 99214 OFFICE O/P EST MOD 30 MIN: CPT | Performed by: FAMILY MEDICINE

## 2022-07-12 RX ORDER — HYDROCORTISONE 25 MG/G
1 CREAM TOPICAL 2 TIMES DAILY
Qty: 1 EACH | Refills: 1 | Status: SHIPPED | OUTPATIENT
Start: 2022-07-12

## 2022-07-12 RX ORDER — SOLIFENACIN SUCCINATE 5 MG/1
5 TABLET, FILM COATED ORAL DAILY
Qty: 90 TABLET | Refills: 1 | Status: SHIPPED | OUTPATIENT
Start: 2022-07-12

## 2022-07-18 ENCOUNTER — LAB ENCOUNTER (OUTPATIENT)
Dept: LAB | Facility: HOSPITAL | Age: 62
End: 2022-07-18
Attending: INTERNAL MEDICINE
Payer: COMMERCIAL

## 2022-07-18 ENCOUNTER — OFFICE VISIT (OUTPATIENT)
Dept: SURGERY | Facility: CLINIC | Age: 62
End: 2022-07-18
Payer: COMMERCIAL

## 2022-07-18 VITALS
OXYGEN SATURATION: 98 % | HEART RATE: 63 BPM | HEIGHT: 61.7 IN | SYSTOLIC BLOOD PRESSURE: 135 MMHG | DIASTOLIC BLOOD PRESSURE: 75 MMHG | BODY MASS INDEX: 42.33 KG/M2 | WEIGHT: 230 LBS

## 2022-07-18 DIAGNOSIS — R73.09 ABNORMAL BLOOD SUGAR: ICD-10-CM

## 2022-07-18 DIAGNOSIS — G47.33 OSA ON CPAP: ICD-10-CM

## 2022-07-18 DIAGNOSIS — Z99.89 OSA ON CPAP: ICD-10-CM

## 2022-07-18 DIAGNOSIS — R60.0 LOWER EXTREMITY EDEMA: ICD-10-CM

## 2022-07-18 DIAGNOSIS — R63.5 WEIGHT GAIN: ICD-10-CM

## 2022-07-18 DIAGNOSIS — E78.5 DYSLIPIDEMIA: ICD-10-CM

## 2022-07-18 DIAGNOSIS — E66.01 MORBID OBESITY WITH BMI OF 40.0-44.9, ADULT (HCC): ICD-10-CM

## 2022-07-18 DIAGNOSIS — G47.33 OSA ON CPAP: Primary | ICD-10-CM

## 2022-07-18 DIAGNOSIS — Z99.89 OSA ON CPAP: Primary | ICD-10-CM

## 2022-07-18 LAB
EST. AVERAGE GLUCOSE BLD GHB EST-MCNC: 154 MG/DL (ref 68–126)
HBA1C MFR BLD: 7 % (ref ?–5.7)
VIT B12 SERPL-MCNC: 1486 PG/ML (ref 193–986)

## 2022-07-18 PROCEDURE — 99204 OFFICE O/P NEW MOD 45 MIN: CPT | Performed by: INTERNAL MEDICINE

## 2022-07-18 PROCEDURE — 3051F HG A1C>EQUAL 7.0%<8.0%: CPT | Performed by: FAMILY MEDICINE

## 2022-07-18 PROCEDURE — 82607 VITAMIN B-12: CPT

## 2022-07-18 PROCEDURE — 83036 HEMOGLOBIN GLYCOSYLATED A1C: CPT

## 2022-07-18 PROCEDURE — 3075F SYST BP GE 130 - 139MM HG: CPT | Performed by: INTERNAL MEDICINE

## 2022-07-18 PROCEDURE — 36415 COLL VENOUS BLD VENIPUNCTURE: CPT

## 2022-07-18 PROCEDURE — 3008F BODY MASS INDEX DOCD: CPT | Performed by: INTERNAL MEDICINE

## 2022-07-18 PROCEDURE — 82397 CHEMILUMINESCENT ASSAY: CPT

## 2022-07-18 PROCEDURE — 3078F DIAST BP <80 MM HG: CPT | Performed by: INTERNAL MEDICINE

## 2022-07-18 RX ORDER — TOPIRAMATE 25 MG/1
25 TABLET ORAL EVERY EVENING
Qty: 30 TABLET | Refills: 2 | Status: SHIPPED | OUTPATIENT
Start: 2022-07-18

## 2022-07-18 RX ORDER — HYDROCHLOROTHIAZIDE 12.5 MG/1
12.5 CAPSULE, GELATIN COATED ORAL DAILY
Qty: 30 CAPSULE | Refills: 1 | Status: SHIPPED | OUTPATIENT
Start: 2022-07-18

## 2022-07-21 LAB — LEPTIN: 43.8 NG/ML

## 2022-07-25 RX ORDER — QUETIAPINE FUMARATE 50 MG/1
TABLET, FILM COATED ORAL
Qty: 90 TABLET | Refills: 0 | Status: SHIPPED | OUTPATIENT
Start: 2022-07-25

## 2022-08-09 DIAGNOSIS — R60.0 LOWER EXTREMITY EDEMA: ICD-10-CM

## 2022-08-09 RX ORDER — HYDROCHLOROTHIAZIDE 12.5 MG/1
CAPSULE, GELATIN COATED ORAL
Qty: 30 CAPSULE | Refills: 1 | Status: SHIPPED | OUTPATIENT
Start: 2022-08-09

## 2022-08-17 DIAGNOSIS — R49.0 HOARSENESS: ICD-10-CM

## 2022-08-17 RX ORDER — PANTOPRAZOLE SODIUM 40 MG/1
40 TABLET, DELAYED RELEASE ORAL
Qty: 30 TABLET | Refills: 0 | Status: SHIPPED | OUTPATIENT
Start: 2022-08-17 | End: 2022-11-29

## 2022-09-06 RX ORDER — SOLIFENACIN SUCCINATE 5 MG/1
5 TABLET, FILM COATED ORAL DAILY
Qty: 90 TABLET | Refills: 1 | Status: SHIPPED | OUTPATIENT
Start: 2022-09-06

## 2022-09-13 ENCOUNTER — OFFICE VISIT (OUTPATIENT)
Dept: SURGERY | Facility: CLINIC | Age: 62
End: 2022-09-13
Payer: COMMERCIAL

## 2022-09-13 VITALS
OXYGEN SATURATION: 94 % | HEART RATE: 76 BPM | WEIGHT: 229 LBS | BODY MASS INDEX: 42.14 KG/M2 | DIASTOLIC BLOOD PRESSURE: 76 MMHG | HEIGHT: 61.7 IN | SYSTOLIC BLOOD PRESSURE: 135 MMHG

## 2022-09-13 DIAGNOSIS — Z99.89 OSA ON CPAP: Primary | ICD-10-CM

## 2022-09-13 DIAGNOSIS — R60.0 LOWER EXTREMITY EDEMA: ICD-10-CM

## 2022-09-13 DIAGNOSIS — G47.33 OSA ON CPAP: Primary | ICD-10-CM

## 2022-09-13 DIAGNOSIS — E66.01 MORBID OBESITY WITH BMI OF 40.0-44.9, ADULT (HCC): ICD-10-CM

## 2022-09-13 DIAGNOSIS — E78.5 DYSLIPIDEMIA: ICD-10-CM

## 2022-09-13 PROCEDURE — 99214 OFFICE O/P EST MOD 30 MIN: CPT | Performed by: INTERNAL MEDICINE

## 2022-09-13 PROCEDURE — 3078F DIAST BP <80 MM HG: CPT | Performed by: INTERNAL MEDICINE

## 2022-09-13 PROCEDURE — 3008F BODY MASS INDEX DOCD: CPT | Performed by: INTERNAL MEDICINE

## 2022-09-13 PROCEDURE — 3075F SYST BP GE 130 - 139MM HG: CPT | Performed by: INTERNAL MEDICINE

## 2022-09-13 RX ORDER — PHENTERMINE HYDROCHLORIDE 15 MG/1
15 CAPSULE ORAL EVERY MORNING
Qty: 30 CAPSULE | Refills: 2 | Status: SHIPPED | OUTPATIENT
Start: 2022-09-13

## 2022-09-16 ENCOUNTER — TELEPHONE (OUTPATIENT)
Dept: FAMILY MEDICINE CLINIC | Facility: CLINIC | Age: 62
End: 2022-09-16

## 2022-09-16 DIAGNOSIS — G47.30 SLEEP APNEA, UNSPECIFIED TYPE: Primary | ICD-10-CM

## 2022-09-16 NOTE — TELEPHONE ENCOUNTER
Patient was told she needs to use CPAP machine by her Bariatric physician  but would like a new machine since she stopped using the one she had due to recall. Patient would like to speak to someone from office in order to receive new machine.

## 2022-09-16 NOTE — TELEPHONE ENCOUNTER
Dr Eli Butler please advise on a message below. LOV with you 7/12/2022. Does a pt need to be seen in the office or schedule virtual appt in re: new CPAP machine?

## 2022-09-18 NOTE — TELEPHONE ENCOUNTER
I placed referral for cpap machine   I am not sure if this is sufficient   I also do not know what are the settings needed  Please assist if any issues

## 2022-09-19 NOTE — TELEPHONE ENCOUNTER
Faxed to [address] patient with language lines Ирина Matthews GL#[id number]  No answer voicemail not set up

## 2022-09-21 ENCOUNTER — LAB ENCOUNTER (OUTPATIENT)
Dept: LAB | Age: 62
End: 2022-09-21

## 2022-09-21 ENCOUNTER — OFFICE VISIT (OUTPATIENT)
Dept: FAMILY MEDICINE CLINIC | Facility: CLINIC | Age: 62
End: 2022-09-21

## 2022-09-21 VITALS
HEIGHT: 61.7 IN | BODY MASS INDEX: 42.33 KG/M2 | RESPIRATION RATE: 16 BRPM | DIASTOLIC BLOOD PRESSURE: 73 MMHG | SYSTOLIC BLOOD PRESSURE: 119 MMHG | WEIGHT: 230 LBS | TEMPERATURE: 97 F | HEART RATE: 66 BPM | OXYGEN SATURATION: 96 %

## 2022-09-21 DIAGNOSIS — R05.1 ACUTE COUGH: Primary | ICD-10-CM

## 2022-09-21 DIAGNOSIS — Z20.822 SUSPECTED COVID-19 VIRUS INFECTION: ICD-10-CM

## 2022-09-21 DIAGNOSIS — J02.9 SORE THROAT: ICD-10-CM

## 2022-09-21 PROCEDURE — 3008F BODY MASS INDEX DOCD: CPT

## 2022-09-21 PROCEDURE — 3074F SYST BP LT 130 MM HG: CPT

## 2022-09-21 PROCEDURE — 99213 OFFICE O/P EST LOW 20 MIN: CPT

## 2022-09-21 PROCEDURE — 3078F DIAST BP <80 MM HG: CPT

## 2022-09-21 RX ORDER — FLUTICASONE PROPIONATE 50 MCG
2 SPRAY, SUSPENSION (ML) NASAL DAILY
Qty: 16 G | Refills: 0 | Status: SHIPPED | OUTPATIENT
Start: 2022-09-21 | End: 2023-09-16

## 2022-09-21 RX ORDER — ALBUTEROL SULFATE 90 UG/1
2 AEROSOL, METERED RESPIRATORY (INHALATION) EVERY 4 HOURS PRN
Qty: 18 G | Refills: 0 | Status: SHIPPED | OUTPATIENT
Start: 2022-09-21

## 2022-09-21 RX ORDER — CODEINE PHOSPHATE AND GUAIFENESIN 10; 100 MG/5ML; MG/5ML
10 SOLUTION ORAL EVERY 6 HOURS PRN
Qty: 120 ML | Refills: 0 | Status: SHIPPED | OUTPATIENT
Start: 2022-09-21

## 2022-09-22 LAB — SARS-COV-2 RNA RESP QL NAA+PROBE: NOT DETECTED

## 2022-09-26 ENCOUNTER — TELEPHONE (OUTPATIENT)
Dept: FAMILY MEDICINE CLINIC | Facility: CLINIC | Age: 62
End: 2022-09-26

## 2022-09-29 ENCOUNTER — TELEPHONE (OUTPATIENT)
Dept: RHEUMATOLOGY | Facility: CLINIC | Age: 62
End: 2022-09-29

## 2022-09-29 NOTE — TELEPHONE ENCOUNTER
Patient states she is having joint pain in her left knee, hand joints and back. Patient would like to know if doctor can prescribe her prednisone. Patient was last seen in 2020 & scheduled for a follow up on 10/24.

## 2022-09-29 NOTE — TELEPHONE ENCOUNTER
Noted.      Future Appointments   Date Time Provider Robert Crocker   10/3/2022  4:30 PM Rasahd Avalos MD [facility]   10/11/2022  2:15 PM Josephine Rojas MD [facility]   12/27/2022  2:30 PM Malini Vernon MD [address] [place] [address]

## 2022-09-29 NOTE — TELEPHONE ENCOUNTER
Pt on the phone returning missed call.  Pt was informed about the message below and scheduled an appointment for 10/3 at 4:30 pm. Please advise

## 2022-10-13 RX ORDER — ALBUTEROL SULFATE 90 UG/1
2 AEROSOL, METERED RESPIRATORY (INHALATION) EVERY 4 HOURS PRN
Qty: 6.7 EACH | Refills: 1 | Status: SHIPPED | OUTPATIENT
Start: 2022-10-13

## 2022-10-13 RX ORDER — FLUTICASONE PROPIONATE 50 MCG
2 SPRAY, SUSPENSION (ML) NASAL DAILY
Qty: 16 ML | Refills: 1 | Status: SHIPPED | OUTPATIENT
Start: 2022-10-13

## 2022-10-13 RX ORDER — TOPIRAMATE 25 MG/1
TABLET ORAL
Qty: 90 TABLET | Refills: 0 | OUTPATIENT
Start: 2022-10-13

## 2022-10-13 NOTE — TELEPHONE ENCOUNTER
[facility]. Requested Prescriptions   Pending Prescriptions Disp Refills    ALBUTEROL 108 (90 Base) MCG/ACT Inhalation Aero Soln [Pharmacy Med Name: ALBUTEROL HFA (PROVENTIL) INH] 6.7 each 0     Sig: INHALE 2 PUFFS INTO THE LUNGS EVERY 4 HOURS AS NEEDED FOR WHEEZING OR SHORTNESS OF BREATH.         Asthma & COPD Medication Protocol Passed - 10/13/2022 10:34 AM        Passed - In person appointment or virtual visit in the past 6 mos or appointment in next 3 mos       Recent Outpatient Visits              2 days ago Other depression    [facility], [address] MD    Office Visit    3 weeks ago Acute cough    Thomas Dailey, MARY ANN Herron    Office Visit    1 month ago GÉNESIS on CPAP    Windell Kawasaki, MD    Office Visit    2 months ago GÉNESIS on CPAP    [address] [place] [address], Michael Segura MD    Office Visit    3 months ago Urge incontinence of urine    [facility], [address] MD    Office Visit     Future Appointments         Provider Department Appt Notes    In 1 week [facility], [facility], [facility] arthritis f/u    In 1 month Luzmaria Vora MD [facility], [facility], [facility] follow up    In 2 months Merna Kwong, [address] [facility] [address], [address], [place] [employer] PPO  NON SX F/U                  FLUTICASONE PROPIONATE 50 MCG/ACT Nasal Suspension [place] Med Name: FLUTICASONE PROP 50 MCG SPRAY] 16 mL 0     Sig: SPRAY 2 SPRAYS INTO EACH NOSTRIL EVERY DAY        Allergy Medication Protocol Passed - 10/13/2022 10:34 AM        Passed - In person appointment or virtual visit in the past 12 mos or appointment in next 3 mos       Recent Outpatient Visits              2 days ago Other depression    [facility], [address] Eloy Grider MD    Office Visit    3 weeks ago Acute cough    Thomas Benavides Ruston, MARY ANN    Office Visit    1 month ago GÉNESIS on CPAP    Ирина Alvarez MD    Office Visit    2 months ago GÉNESIS on CPAP    Ирина Alvarez MD    Office Visit    3 months ago Urge incontinence of urine    [place] [employer] [facility], [facility], [address] MD    Office Visit     Future Appointments         Provider Department Appt Notes    In 1 week Stefanie Swan MD [facility], [facility] arthritis f/u    In 1 month Vinod Branch MD [place] [employer], [facility], Ori 1 month follow up    In 2 months Sina Osorio MD [address], [address], [place] BCBS PPO  NON SX F/U                     Recent Outpatient Visits              2 days ago Other depression    [place] [facility], [address] MD    Office Visit    3 weeks ago Acute cough    [facility], [address] Torres [place], MARY ANN    Office Visit    1 month ago GÉNESIS on CPAP    Ирина Alvarez MD    Office Visit    2 months ago GÉNESIS on CPAP    Ирина Alvarez MD    Office Visit    3 months ago Urge incontinence of urine    [facility], [facility], [address] Torres MD    Office Visit            Future Appointments         Provider Department Appt Notes    In 1 week Stefanie Swan MD [facility], [facility]    In 1 month Vinod Branch MD [place] [facility], [facility], Ori 1 month follow up    In 2 months Sina Osorio MD [address], [address], [place] BCBS PPO  NON SX F/U

## 2022-10-14 ENCOUNTER — TELEPHONE (OUTPATIENT)
Dept: FAMILY MEDICINE CLINIC | Facility: CLINIC | Age: 62
End: 2022-10-14

## 2022-10-14 NOTE — TELEPHONE ENCOUNTER
----- Message from [facility] sent at 10/14/2022  1:55 PM CDT -----  Regarding: Joby Anderson all is well. I reached out to your patient and discussed behavioral health options and services. Pt states that she would like some time to decide on engagement in therapy services. Pt was informed to call the [facility] team if she needs assistance in the future. Pt did voice some concerns regarding recent prescribed medications, mainly when to take the medications as she states she is having trouble sleeping. Pt also reports concern regarding a \"machine\" that was given to her by your office. If someone from the office can give her a call at your earliest convenience.

## 2022-10-14 NOTE — TELEPHONE ENCOUNTER
Macedonian Language Florentin Luis ID # [id number] (verified Name and ). Clarified to take Wellbutrin every day as instructed. She reports that this medication is much better than the last one. Relayed that sometimes medication takes a while to work. Patient just started taking the medication on 10/11. She will schedule an appointment for sleep apnea as recommended.

## 2022-10-14 NOTE — TELEPHONE ENCOUNTER
----- Message from Joel Holloway MD sent at 10/14/2022  3:16 PM CDT -----  Regarding: FW: Soto Obey  Please call the patient and clarify the issue  ----- Message -----  From: Radha Zelaya LCPC  Sent: 10/14/2022   2:00 PM CDT  To: Joel Holloway MD  Subject: Enrrique Og Poisson all is well. I reached out to your patient and discussed behavioral health options and services. Pt states that she would like some time to decide on engagement in therapy services. Pt was informed to call the [facility] team if she needs assistance in the future. Pt did voice some concerns regarding recent prescribed medications, mainly when to take the medications as she states she is having trouble sleeping. Pt also reports concern regarding a \"machine\" that was given to her by your office. If someone from the office can give her a call at your earliest convenience.

## 2022-10-17 NOTE — TELEPHONE ENCOUNTER
Dr. Ye Diana Please see Gem RN message below. I also called pt regarding the comment about a machine that was given to her at the office. Pt stated that she did not say she was given a machine. Pt stated that she needs a New pap machine. Noted that this was ordered on  9/17. Triage Support can the [facility] be [facility] with demographic.

## 2022-10-20 NOTE — PROGRESS NOTES
Brea Jaimes is a 69-year-old woman with diffuse myofascial pain, depression, and osteoarthritis, who I last saw 3/16/2022. A  was on the line with us, Carlos Santiago ([id number]). 1 month ago she had a severe flare of pain all over. After a week, it got better on its own. She just now is in for her follow-up appointment. She is on Wellbutrin 150 mg a day for depression and anxiety, which is working. She is afraid of meloxicam, and is sticking with Tylenol. Today her pain is reasonable. Tylenol helps. Her knees and hand PIP and DIP joints are uncomfortable. She recalls that nortriptyline 10 to 20 mg at bedtime helped years ago. She would like to go back on it. Her muscles tighten up     Review of Systems   Constitutional: Positive for fatigue. Negative for chills, diaphoresis and fever. Respiratory: Negative for shortness of breath. Cardiovascular: Negative for chest pain. Gastrointestinal: Negative for abdominal pain. Skin: Negative for rash. Hematological: Negative for adenopathy. Allergies:No Known Allergies     PHYSICAL EXAM:   Blood pressure 109/67, pulse 66, height 5' 1\" (1.549 m), weight 224 lb (101.6 kg). Constitutional: She is oriented to person, place, and time. She appears well-developed and well-nourished. HENT:   Head: Normocephalic. Eyes: Conjunctivae are normal.   Heart: Regular S1 and S2.  Lungs: Clear breath sounds. Abdomen: Nontender. Musculoskeletal:    She walks without a limp. There is mild tenderness to palpation of the muscles across her upper back and shoulders. Her knees flex and extend fully with minimal discomfort and no effusions. Her knees flex and extend well, with some crepitance and pain. She has Heberden's and Maritza's nodes across her hands. Neurological: She is alert and oriented to person, place, and time. Psychiatric: She has a normal mood and affect. ASSESSMENT/PLAN:     1. Osteoarthritis.  She will try to do better with her exercise. Tylenol, up to 3000 mg a day, as needed. 2. Fibromyalgia. Amitriptyline anad gabapentin 300 at bedtime made her too drowsy into the AM. Continue Wellbutrin, which is working for depression and anxiety. Exercise. Will restart nortriptyline 10-20 mg at bedtime, which helps a lot. 3. Depression. Wellbutrin. 4.  Bone health. She will take 1500 mg of calcium and 1000 units of vitamin D daily. I will see her back in 6 months.

## 2022-10-21 ENCOUNTER — OFFICE VISIT (OUTPATIENT)
Dept: FAMILY MEDICINE CLINIC | Facility: CLINIC | Age: 62
End: 2022-10-21
Payer: COMMERCIAL

## 2022-10-21 VITALS
WEIGHT: 223 LBS | RESPIRATION RATE: 16 BRPM | DIASTOLIC BLOOD PRESSURE: 70 MMHG | SYSTOLIC BLOOD PRESSURE: 116 MMHG | HEIGHT: 61 IN | BODY MASS INDEX: 42.1 KG/M2 | TEMPERATURE: 97 F | HEART RATE: 56 BPM

## 2022-10-21 DIAGNOSIS — E11.69 DIABETES MELLITUS TYPE 2 IN OBESE (HCC): Primary | ICD-10-CM

## 2022-10-21 DIAGNOSIS — E66.9 DIABETES MELLITUS TYPE 2 IN OBESE (HCC): Primary | ICD-10-CM

## 2022-10-21 LAB
CARTRIDGE LOT#: ABNORMAL NUMERIC
HEMOGLOBIN A1C: 6.2 % (ref 4.3–5.6)

## 2022-10-21 PROCEDURE — 3078F DIAST BP <80 MM HG: CPT | Performed by: FAMILY MEDICINE

## 2022-10-21 PROCEDURE — 99214 OFFICE O/P EST MOD 30 MIN: CPT | Performed by: FAMILY MEDICINE

## 2022-10-21 PROCEDURE — 3044F HG A1C LEVEL LT 7.0%: CPT | Performed by: FAMILY MEDICINE

## 2022-10-21 PROCEDURE — 83036 HEMOGLOBIN GLYCOSYLATED A1C: CPT | Performed by: FAMILY MEDICINE

## 2022-10-21 PROCEDURE — 3074F SYST BP LT 130 MM HG: CPT | Performed by: FAMILY MEDICINE

## 2022-10-21 PROCEDURE — 3008F BODY MASS INDEX DOCD: CPT | Performed by: FAMILY MEDICINE

## 2022-10-24 ENCOUNTER — OFFICE VISIT (OUTPATIENT)
Dept: RHEUMATOLOGY | Facility: CLINIC | Age: 62
End: 2022-10-24
Payer: COMMERCIAL

## 2022-10-24 VITALS
WEIGHT: 224 LBS | DIASTOLIC BLOOD PRESSURE: 67 MMHG | HEART RATE: 66 BPM | BODY MASS INDEX: 42.29 KG/M2 | HEIGHT: 61 IN | SYSTOLIC BLOOD PRESSURE: 109 MMHG

## 2022-10-24 DIAGNOSIS — M79.7 FIBROMYALGIA: ICD-10-CM

## 2022-10-24 DIAGNOSIS — M19.91 PRIMARY OSTEOARTHRITIS, UNSPECIFIED SITE: Primary | ICD-10-CM

## 2022-10-24 PROCEDURE — 3008F BODY MASS INDEX DOCD: CPT | Performed by: INTERNAL MEDICINE

## 2022-10-24 PROCEDURE — 3074F SYST BP LT 130 MM HG: CPT | Performed by: INTERNAL MEDICINE

## 2022-10-24 PROCEDURE — 3078F DIAST BP <80 MM HG: CPT | Performed by: INTERNAL MEDICINE

## 2022-10-24 PROCEDURE — 99213 OFFICE O/P EST LOW 20 MIN: CPT | Performed by: INTERNAL MEDICINE

## 2022-10-24 RX ORDER — MELOXICAM 7.5 MG/1
TABLET ORAL
Qty: 30 TABLET | Refills: 1 | Status: SHIPPED | OUTPATIENT
Start: 2022-10-24 | End: 2022-10-24

## 2022-10-24 RX ORDER — NORTRIPTYLINE HYDROCHLORIDE 10 MG/1
CAPSULE ORAL
Qty: 180 CAPSULE | Refills: 3 | Status: SHIPPED | OUTPATIENT
Start: 2022-10-24

## 2022-10-25 ENCOUNTER — TELEPHONE (OUTPATIENT)
Dept: RHEUMATOLOGY | Facility: CLINIC | Age: 62
End: 2022-10-25

## 2022-10-25 NOTE — TELEPHONE ENCOUNTER
Pharmacy currently at lunch, left message okay to dispense medications. Call office back with questions.

## 2022-10-25 NOTE — TELEPHONE ENCOUNTER
Please let her pharmacy know that it is OK for her to take very low-dose nortriptyline at bedtime, in addition to her bupropion. Low-dose nortriptyline before bedtime worked very well for her in the past, and she tolerated it well. Thank you.

## 2022-11-07 RX ORDER — LEVOTHYROXINE SODIUM 0.1 MG/1
TABLET ORAL
Qty: 90 TABLET | Refills: 1 | Status: SHIPPED | OUTPATIENT
Start: 2022-11-07

## 2022-11-07 RX ORDER — FENOFIBRATE 160 MG/1
TABLET ORAL
Qty: 90 TABLET | Refills: 1 | Status: SHIPPED | OUTPATIENT
Start: 2022-11-07

## 2022-11-07 NOTE — TELEPHONE ENCOUNTER
Refill passed per [facility] protocol.     Requested Prescriptions   Pending Prescriptions Disp Refills    FENOFIBRATE 160 MG Oral Tab [Pharmacy Med Name: FENOFIBRATE 160 MG TABLET] 90 tablet 1     Sig: TAKE 1 TABLET BY MOUTH EVERY DAY       Cholesterol Medication Protocol Passed - 11/5/2022 10:24 AM        Passed - ALT in past 12 months        Passed - LDL in past 12 months        Passed - Last ALT < 80     Lab Results   Component Value Date    ALT 26 06/15/2022             Passed - Last LDL < 130     Lab Results   Component Value Date    LDL 99 06/15/2022             Passed - In person appointment or virtual visit in the past 12 mos or appointment in next 3 mos     Recent Outpatient Visits              2 weeks ago Primary osteoarthritis, unspecified site    [facility], [address], Carl Paul MD    Office Visit    2 weeks ago Diabetes mellitus type 2 in obese [facility])    [facility], [address] Torres MD    Office Visit    3 weeks ago Other depression    [facility], [address] Torres MD    Office Visit    1 month ago Acute cough    [facility], [address] Thomas Torres Dustin Eastern, APRN    Office Visit    1 month ago GÉNESIS on CPAP    Iwona Millard MD    Office Visit          Future Appointments         Provider Department Appt Notes    In 1 week Usha Shea MD [facility], Ori 1 month follow up    In 1 month [facility] THE [address] [facility]     In 809 Nitohilaria Shepard MD [address] [place] [address], [address] [facility] [address], [address] [place] [address] PPO  NON SX F/U    In 5 months [place] Pa MD [facility], [address], [place] 6 mo f/u                 LEVOTHYROXINE 100 MCG Oral Tab [Pharmacy Med Name: LEVOTHYROXINE 100 MCG TABLET] 90 tablet 1     Sig: TAKE 1 TABLET BY MOUTH EVERY DAY Thyroid Medication Protocol Passed - 11/5/2022 10:24 AM        Passed - TSH in past 12 months        Passed - Last TSH value is normal     Lab Results   Component Value Date    TSH 2.700 06/15/2022                 Passed - In person appointment or virtual visit in the past 12 mos or appointment in next 3 mos     Recent Outpatient Visits              2 weeks ago Primary osteoarthritis, unspecified site    [facility], [address], Edel Crystal MD    Office Visit    2 weeks ago Diabetes mellitus type 2 in obese [facility])    Sophia Holly MD    Office Visit    3 weeks ago Other depression    [place] [facility], [facility], [address] RAMOS MD    Office Visit    1 month ago Acute cough    [facility], [facility], Thomas RAMOS Ruston, APRN    Office Visit    1 month ago GÉNESIS on CPAP    Tono Jimenez MD    Office Visit          Future Appointments         Provider Department Appt Notes    In 1 week Rene Rooney MD [facility], [facility], Ori 1 month follow up    In 1 month [facility] [address] [place] [address]     In 809 [facility], [facility], [address], [address] PPO  NON SX F/U    In 5 months Melissa Cox MD [facility], [address], [place] 6 mo f/u                    Recent Outpatient Visits              2 weeks ago Primary osteoarthritis, unspecified site    [facility], [address], Edel Crystal MD    Office Visit    2 weeks ago Diabetes mellitus type 2 in obese [facility])    Sophia Holly MD    Office Visit    3 weeks ago Other depression    Sophia Holly MD    Office Visit    1 month ago Acute cough    [address], Nils Guzman, MARY ANN    Office Visit    1 month ago GÉNESIS on CPAP    [facility],[address] Floor, Sai Thomason MD    Office Visit            Future Appointments         Provider Department Appt Notes    In 1 week Mimi Valles MD [facility], [facility] 1 month follow up    In 1 month [facility] [address] [place] [address]     In 1 month Nataly Centeno MD [address] [facility],[address] Floor, [place] BCBS PPO  NON SX F/U    In 5 months Richard Kitchen MD [facility], [address], [place] 6 mo f/u

## 2022-11-29 DIAGNOSIS — R49.0 HOARSENESS: ICD-10-CM

## 2022-11-29 RX ORDER — SIMVASTATIN 20 MG
TABLET ORAL
Qty: 90 TABLET | Refills: 1 | Status: SHIPPED | OUTPATIENT
Start: 2022-11-29

## 2022-11-29 RX ORDER — PANTOPRAZOLE SODIUM 40 MG/1
40 TABLET, DELAYED RELEASE ORAL
Qty: 90 TABLET | Refills: 1 | Status: SHIPPED | OUTPATIENT
Start: 2022-11-29

## 2022-11-29 NOTE — TELEPHONE ENCOUNTER
Refill passed per [facility] protocol. Requested Prescriptions   Pending Prescriptions Disp Refills    SIMVASTATIN 20 MG Oral Tab [Pharmacy Med Name: SIMVASTATIN 20 MG TABLET] 90 tablet 1     Sig: TAKE 1 TABLET BY MOUTH NIGHTLY. AT BEDTIME.        Cholesterol Medication Protocol Passed - 11/29/2022 12:35 PM        Passed - ALT in past 12 months        Passed - LDL in past 12 months        Passed - Last ALT < 80     Lab Results   Component Value Date    ALT 26 06/15/2022             Passed - Last LDL < 130     Lab Results   Component Value Date    LDL 99 06/15/2022             Passed - In person appointment or virtual visit in the past 12 mos or appointment in next 3 mos     Recent Outpatient Visits              2 weeks ago Other depression    [facility], [address] MD    Office Visit    1 month ago Primary osteoarthritis, unspecified site    [facility], [address], Carl Paul MD    Office Visit    1 month ago Diabetes mellitus type 2 in obese [facility])    Daysi Mills MD    Office Visit    1 month ago Other depression    [facility], [address] MD    Office Visit    2 months ago Acute cough    [address], Thurl Ehsan, APRN    Office Visit          Future Appointments         Provider Department Appt Notes    In 1 week [facility] SYSTEM OF THE [place] [id number] [address]     In 4 weeks Dawnkeesha Saunders, [address] [place] [address],2Nd Floor, [place] BC[employer] PPO  NON SX F/U    In 2 months Usha Shea MD [employer] [place], [facility], DarylBanner [facility] 3 months follow up    In 5 months Milton Pa MD [facility], [address], [place] 6 mo f/u                 PANTOPRAZOLE 40 MG Oral Tab EC [Pharmacy Med Name: PANTOPRAZOLE SOD DR 40 MG TAB] 30 tablet 0     Sig: TOME GINNY TABLETA POR VIA ORAL BEFORE BREAKFAST       Gastrointestional Medication Protocol Passed - 11/29/2022 12:35 PM        Passed - In person appointment or virtual visit in the past 12 mos or appointment in next 3 mos     Recent Outpatient Visits              2 weeks ago Other depression    [address], [address] MD    Office Visit    1 month ago Primary osteoarthritis, unspecified site    [facility], [address], Edel Crystal MD    Office Visit    1 month ago Diabetes mellitus type 2 in obese [facility])    Sophia Holly MD    Office Visit    1 month ago Other depression    [address], [address] MD    Office Visit    2 months ago Acute cough    [address], [place] MARY ANN Camacho    Office Visit          Future Appointments         Provider Department Appt Notes    In 1 week [facility] OF [address] . [address] [place] [address]     In 4 weeks Ander Pérez, [facility], [address], [place] BCBS PPO  NON SX F/U    In 2 months Rene Rooney MD [address] 3 months follow up    In 5 months Melissa Cox MD [facility], [address] 6 mo f/u                  Recent Outpatient Visits              2 weeks ago Other depression    [address], [address] MD    Office Visit    1 month ago Primary osteoarthritis, unspecified site    [facility], [address], Edel Crystal MD    Office Visit    1 month ago Diabetes mellitus type 2 in obese [facility])    Sophia Holly MD    Office Visit    1 month ago Other depression    Sophia Holly MD    Office Visit    2 months ago Acute cough    [address] MARY ANN Nesbitt    Office Visit          Future Appointments         Provider Department Appt Notes    In 1 week [facility] SYSTEM OF THE [facility] [address]     In 4 weeks Samir Schulte MD [address] [facility],2Nd Floor, [place] BCBS PPO  NON SX F/U    In 2 months Willis Huerta MD [facility] 3 months follow up    In 5 months Elena Woods MD [facility], [address] [place] [address],[address], [place] 6 mo f/u

## 2022-12-05 ENCOUNTER — TELEPHONE (OUTPATIENT)
Dept: FAMILY MEDICINE CLINIC | Facility: CLINIC | Age: 62
End: 2022-12-05

## 2022-12-05 DIAGNOSIS — L65.9 HAIR LOSS: Primary | ICD-10-CM

## 2022-12-05 RX ORDER — QUETIAPINE FUMARATE 50 MG/1
50 TABLET, EXTENDED RELEASE ORAL NIGHTLY
Qty: 90 TABLET | Refills: 1 | Status: SHIPPED | OUTPATIENT
Start: 2022-12-05

## 2022-12-05 RX ORDER — MINOXIDIL 2 %
1 SOLUTION, NON-ORAL TOPICAL DAILY
Qty: 1 EACH | Refills: 3 | Status: SHIPPED | OUTPATIENT
Start: 2022-12-05

## 2022-12-05 NOTE — TELEPHONE ENCOUNTER
With Language Line  Talia Molina  ID # [id number]    Patient calling ( identified name and  ) states was started on new medication and her hair is falling out , it is worse when she kearney her hair   Asked patient which medication is she referring to? ?     States Metformin and Wellbutrin ; Has been happening for about one month     She does not check  Her BG states  she is pre-diabetic,  Does feel  better with the Wellbutrin       States she takes her Levothyroxine as directed      ( Also asking about Quetiapine , explained this med has been discontinued and has rx for Simvastatin  Patient states was not told to stop Quetiapine  )       Patient is very excited, talks fast,  Over talking the      Asking if she should continue the Metformin and Wellbutrin  Or what is  next step ? Please advise and thank you. Best call back number: Dameon Puente  at [phone]   ( Patient advised we may be  calling back with an  .   The number will not be listed as our office, so please answer the call )

## 2022-12-06 NOTE — TELEPHONE ENCOUNTER
I[place]ic Language Line: Khris Gavinarun ID # [id number] (verified Name and ). Relayed Dr. Alexei Mayfield message below. Patient verbalized understanding and had no further questions at this time.

## 2022-12-10 ENCOUNTER — HOSPITAL ENCOUNTER (OUTPATIENT)
Dept: MAMMOGRAPHY | Facility: HOSPITAL | Age: 62
Discharge: HOME | End: 2022-12-10
Attending: FAMILY MEDICINE
Payer: COMMERCIAL

## 2022-12-10 DIAGNOSIS — Z12.31 ENCOUNTER FOR SCREENING MAMMOGRAM FOR BREAST CANCER: ICD-10-CM

## 2022-12-10 PROCEDURE — 77063 BREAST TOMOSYNTHESIS BI: CPT | Performed by: FAMILY MEDICINE

## 2022-12-10 PROCEDURE — 77067 SCR MAMMO BI INCL CAD: CPT | Performed by: FAMILY MEDICINE

## 2022-12-16 RX ORDER — LORATADINE 10 MG/1
TABLET ORAL
Qty: 90 TABLET | Refills: 1 | Status: SHIPPED | OUTPATIENT
Start: 2022-12-16

## 2022-12-16 RX ORDER — PROPRANOLOL HYDROCHLORIDE 40 MG/1
40 TABLET ORAL 2 TIMES DAILY
Qty: 180 TABLET | Refills: 1 | Status: SHIPPED | OUTPATIENT
Start: 2022-12-16

## 2022-12-16 NOTE — TELEPHONE ENCOUNTER
Refill passed per [address] protocol.     Requested Prescriptions   Pending Prescriptions Disp Refills    LORATADINE 10 MG Oral Tab [Pharmacy Med Name: LORATADINE 10 MG TABLET] 90 tablet 1     Sig: TAKE 1 TABLET BY MOUTH EVERY DAY       Allergy Medication Protocol Passed - 12/16/2022 12:15 AM        Passed - In person appointment or virtual visit in the past 12 mos or appointment in next 3 mos     Recent Outpatient Visits              1 month ago Other depression    [address], [address] MD    Office Visit    1 month ago Primary osteoarthritis, unspecified site    [facility], [address], Nissa Puente MD    Office Visit    1 month ago Diabetes mellitus type 2 in [facility])    Deonna Zarate MD    Office Visit    2 months ago Other depression    [address], [address] MD    Office Visit    2 months ago Acute cough    [address], [place], Mague Jiang, APRN    Office Visit          Future Appointments         Provider Department Appt Notes    In 1 week Nora Angel, [facility], [address], [place] BCBS PPO  NON SX F/U    In 2 months Mohan Thrasher MD [address] 3 months follow up    In 4 months Carl Howard MD [facility], [address], [place] 6 mo f/u                 PROPRANOLOL 40 MG Oral Tab [Pharmacy Med Name: PROPRANOLOL 40 MG TABLET] 180 tablet 1     Sig: TAKE 1 TABLET BY MOUTH TWICE A DAY       Hypertensive Medications Protocol Passed - 12/16/2022 12:15 AM        Passed - In person appointment in the past 12 or next 3 months     Recent Outpatient Visits              1 month ago Other depression    Deonna Zarate MD    Office Visit    1 month ago Primary osteoarthritis, unspecified site    [address] [facility], [address], Elvira Goldberg MD    Office Visit    1 month ago Diabetes mellitus type 2 in obese [facility])    Kyree Freedman MD    Office Visit    2 months ago Other depression    [place] [employer] [place], [facility], [address], Jermaine Posadas MD    Office Visit    2 months ago Acute cough    Thomas Lyles Hewitt Pop, APRN    Office Visit          Future Appointments         Provider Department Appt Notes    In 1 week Wyatt Varghese MD [address], [address], [place] BCBS PPO  NON SX F/U    In 2 months Concepcion Hanson MD [place] [employer] [facility] [facility], [facility] 3 months follow up    In 4 months [address] Guillory MD [facility], [address], [place] 6 mo f/u               Passed - Last BP reading less than 140/90     BP Readings from Last 1 Encounters:  11/15/22 : 107/68              Passed - CMP or BMP in past 6 months     Recent Results (from the past 4392 hour(s))   Basic Metabolic Panel (8)    Collection Time: 07/05/22  2:02 AM   Result Value Ref Range    Glucose 130 (H) 70 - 99 mg/dL    Sodium 141 136 - 145 mmol/L    Potassium 4.6 3.5 - 5.1 mmol/L    Chloride 105 98 - 112 mmol/L    CO2 28.0 21.0 - 32.0 mmol/L    Anion Gap 8 0 - 18 mmol/L    BUN 26 (H) 7 - 18 mg/dL    Creatinine 1.05 (H) 0.55 - 1.02 mg/dL    BUN/CREA Ratio 24.8 (H) 10.0 - 20.0    Calcium, Total 8.9 8.5 - 10.1 mg/dL    Calculated Osmolality 299 (H) 275 - 295 mOsm/kg    GFR, Non- 57 (L) >=60    GFR, -American 65 >=60     *Note: Due to a large number of results and/or encounters for the requested time period, some results have not been displayed. A complete set of results can be found in Results Review.                Passed - In person appointment or virtual visit in the past 6 months     Recent Outpatient Visits              1 month ago Other depression    [facility] [facility], Jayson Bauer MD    Office Visit    1 month ago Primary osteoarthritis, unspecified site    [facility], [address], Sybil Mackay MD    Office Visit    1 month ago Diabetes mellitus type 2 in obese [facility])    Karen [address] MD    Office Visit    2 months ago Other depression    [place] [employer] [place], [facility], [address] MD    Office Visit    2 months ago Acute cough    [address], [place] Ve[place] Guzman APRN    Office Visit          Future Appointments         Provider Department Appt Notes    In 1 week Malini Vernon MD [address] [facility],2Nd Floor, [place] BCBS PPO  NON SX F/U    In 2 months Josephine Rojas MD [employer] [facility], [facility] 3 months follow up    In 4 months Rashad Avalos MD [facility], [address], [place] 6 mo f/u               Passed - [facility] or GFRNAA > 50     GFR Evaluation  GFRNAA: 57 , resulted on 7/5/2022             Recent Outpatient Visits              1 month ago Other depression    [employer] [facility], [address] MD    Office Visit    1 month ago Primary osteoarthritis, unspecified site    [facility], [address], Sybil Mackay MD    Office Visit    1 month ago Diabetes mellitus type 2 in obese [facility])    Karen [address] MD    Office Visit    2 months ago Other depression    [address] MD    Office Visit    2 months ago Acute cough    [address], [place] MARY ANN Lima    Office Visit          Future Appointments         Provider Department Appt Notes    In 1 week aMlini Vernon MD [address], [address], [place] BCBS PPO  NON SX F/U    In 2 months Josephine Rojas MD [employer], [address] 3 months follow up    In 4 months Yamil Wesley MD [facility], [address] 6 mo f/u

## 2023-01-06 RX ORDER — BUPROPION HYDROCHLORIDE 150 MG/1
150 TABLET, EXTENDED RELEASE ORAL DAILY
Qty: 90 TABLET | Refills: 0 | Status: SHIPPED | OUTPATIENT
Start: 2023-01-06

## 2023-01-06 NOTE — TELEPHONE ENCOUNTER
Refill passed per [facility] protocol.     Requested Prescriptions   Pending Prescriptions Disp Refills    BUPROPION  MG Oral Tablet 12 Hr [Pharmacy Med Name: BUPROPION HCL  MG TABLET] 90 tablet 0     Sig: MADAY FOSTER       Psychiatric Non-Scheduled (Anti-Anxiety) Passed - 1/6/2023 12:14 AM        Passed - In person appointment or virtual visit in the past 6 mos or appointment in next 3 mos     Recent Outpatient Visits              1 month ago Other depression    [place] [employer] [facility], [address] MD    Office Visit    2 months ago Primary osteoarthritis, unspecified site    [facility], [address], Santi Chiu MD    Office Visit    2 months ago Diabetes mellitus type 2 in [facility])    Christiano Morales MD    Office Visit    2 months ago Other depression    [place] [facility] [facility] [facility], [address] MD    Office Visit    3 months ago Acute cough    [address], [place], Hiwot Berger, APRN    Office Visit          Future Appointments         Provider Department Appt Notes    In 1 month Leslie Mathew MD [facility] [facility], [address] 3 months follow up    In 1 month Sugey Austin MD [employer], [address], [address] PPO  NON SX F/U    In 3 months Johnson Stanton MD [facility], [address] 6 mo f/u                    Recent Outpatient Visits              1 month ago Other depression    Christiano Morales MD    Office Visit    2 months ago Primary osteoarthritis, unspecified site    [facility] [place], Santi Chiu MD    Office Visit    2 months ago Diabetes mellitus type 2 in [facility])    Christiano Morales MD    Office Visit    2 months ago Other depression    [facility], [address] MD    Office Visit    3 months ago Acute cough    [address], [place] Michel Cintron, MARY ANN    Office Visit            Future Appointments         Provider Department Appt Notes    In 1 month Sami Rios MD [address] 3 months follow up    In 1 month Heather Mclean MD [employer], [address] [facility] P[facility]  NON SX F/U    In 3 months Elvis Suresh MD [facility], [address], [place] 6 mo f/u

## 2023-02-01 ENCOUNTER — TELEPHONE (OUTPATIENT)
Dept: FAMILY MEDICINE CLINIC | Facility: CLINIC | Age: 63
End: 2023-02-01

## 2023-02-01 NOTE — TELEPHONE ENCOUNTER
Patient wants a letter stating how much is her glucose , eye doctor requesting.  Please call when ready for

## 2023-02-16 LAB — AMB EXT COVID-19 RESULT: DETECTED

## 2023-02-17 ENCOUNTER — TELEMEDICINE (OUTPATIENT)
Dept: FAMILY MEDICINE CLINIC | Facility: CLINIC | Age: 63
End: 2023-02-17
Payer: COMMERCIAL

## 2023-02-17 ENCOUNTER — NURSE TRIAGE (OUTPATIENT)
Dept: FAMILY MEDICINE CLINIC | Facility: CLINIC | Age: 63
End: 2023-02-17

## 2023-02-17 ENCOUNTER — TELEPHONE (OUTPATIENT)
Dept: FAMILY MEDICINE CLINIC | Facility: CLINIC | Age: 63
End: 2023-02-17

## 2023-02-17 DIAGNOSIS — U07.1 COVID: Primary | ICD-10-CM

## 2023-02-17 DIAGNOSIS — U07.1 COVID: ICD-10-CM

## 2023-02-17 RX ORDER — CODEINE PHOSPHATE AND GUAIFENESIN 10; 100 MG/5ML; MG/5ML
10 SOLUTION ORAL EVERY 6 HOURS PRN
Qty: 180 ML | Refills: 0 | Status: SHIPPED | OUTPATIENT
Start: 2023-02-17 | End: 2023-02-18

## 2023-02-17 NOTE — TELEPHONE ENCOUNTER
Patient is calling to advise PCP she was prescribed a cough medication after today's telemed visit with PCP. Prescription was sent  To [facility] but they have informed her they do not have this in stock and advised her to contact PCP. Patient is requesting the medication be sent to [address] or any other that does have this in stock Patient is requesting a call back. Please advise.

## 2023-02-17 NOTE — TELEPHONE ENCOUNTER
Language line Michelle ID # [id number]- left detailed message- ok per LORETO to find a pharmacy that has  Medication in stock and let us know so we can resend the medication to that pharmacy.

## 2023-02-18 RX ORDER — CODEINE PHOSPHATE AND GUAIFENESIN 10; 100 MG/5ML; MG/5ML
10 SOLUTION ORAL EVERY 6 HOURS PRN
Qty: 180 ML | Refills: 0 | Status: SHIPPED | OUTPATIENT
Start: 2023-02-18

## 2023-02-18 NOTE — TELEPHONE ENCOUNTER
Message noted. Erx sent to listed pharmacy. Please notify patient/ son script for codeine cough syrup sent.

## 2023-02-18 NOTE — TELEPHONE ENCOUNTER
With  Adrianna Palacios #[id number], Advised patient of Dr. Dick Citizen note. Patient verbalized understanding. Pharmacy  [address], [address] [facility], [phone], [phone]      Disp Refills Start End    guaiFENesin-codeine (CHERATUSSIN AC) 100-10 MG/5ML Oral Solution 180 mL 0 2/18/2023     Sig - Route: Take 10 mL by mouth every 6 (six) hours as needed for cough.  - Oral    Sent to pharmacy as: guaiFENesin-Codeine 100-10 MG/5ML Oral Solution    E-Prescribing Status: Receipt confirmed by pharmacy (2/18/2023 11:35 AM CST)

## 2023-02-18 NOTE — TELEPHONE ENCOUNTER
With  Warden Curiel #[id number], patient stated that cheratussin with codeine is on back order at [facility] pharmacy. Patient stated that son called [facility] in [place] and they had it in stock. Rx pended. Dr Rosa Stark not in the office today, sent to on call provider.

## 2023-03-02 ENCOUNTER — OFFICE VISIT (OUTPATIENT)
Dept: SURGERY | Facility: CLINIC | Age: 63
End: 2023-03-02
Payer: COMMERCIAL

## 2023-03-02 VITALS
DIASTOLIC BLOOD PRESSURE: 80 MMHG | SYSTOLIC BLOOD PRESSURE: 122 MMHG | HEART RATE: 61 BPM | BODY MASS INDEX: 41.91 KG/M2 | HEIGHT: 61.2 IN | WEIGHT: 222 LBS

## 2023-03-02 DIAGNOSIS — R60.0 LOWER EXTREMITY EDEMA: ICD-10-CM

## 2023-03-02 DIAGNOSIS — Z99.89 OSA ON CPAP: ICD-10-CM

## 2023-03-02 DIAGNOSIS — G47.33 OSA ON CPAP: ICD-10-CM

## 2023-03-02 DIAGNOSIS — E66.01 MORBID OBESITY WITH BMI OF 40.0-44.9, ADULT (HCC): ICD-10-CM

## 2023-03-02 DIAGNOSIS — E78.5 DYSLIPIDEMIA: Primary | ICD-10-CM

## 2023-03-02 PROCEDURE — 3079F DIAST BP 80-89 MM HG: CPT | Performed by: INTERNAL MEDICINE

## 2023-03-02 PROCEDURE — 99214 OFFICE O/P EST MOD 30 MIN: CPT | Performed by: INTERNAL MEDICINE

## 2023-03-02 PROCEDURE — 3074F SYST BP LT 130 MM HG: CPT | Performed by: INTERNAL MEDICINE

## 2023-03-02 PROCEDURE — 3008F BODY MASS INDEX DOCD: CPT | Performed by: INTERNAL MEDICINE

## 2023-03-02 RX ORDER — PHENTERMINE HYDROCHLORIDE 8 MG/1
1 TABLET ORAL DAILY
Qty: 30 TABLET | Refills: 2 | Status: SHIPPED | OUTPATIENT
Start: 2023-03-02 | End: 2023-04-01

## 2023-03-06 ENCOUNTER — TELEPHONE (OUTPATIENT)
Dept: SURGERY | Facility: CLINIC | Age: 63
End: 2023-03-06

## 2023-03-07 NOTE — TELEPHONE ENCOUNTER
Patient requesting for our office to do get prior authorization for [facility] ORELL[facility]. Stated that she paid $25 out of pocket and would like to see if it could be more affordable. Completed P. A currently waiting for response.

## 2023-03-20 ENCOUNTER — TELEPHONE (OUTPATIENT)
Dept: FAMILY MEDICINE CLINIC | Facility: CLINIC | Age: 63
End: 2023-03-20

## 2023-03-20 NOTE — TELEPHONE ENCOUNTER
Using [employer] ID# [id number]   Patient calling (identified name and ) stating her depression medication was changed from Sertraline 100 mg daily and the new medication (\"purple pill\" but unsure of name) is causing nausea and unable to tolerate. Asking if she can be switched back to Sertraline. Please advise. Would like Sertraline Rx to go to [facility] pharmacy.        Future Appointments   Date Time Provider Robert Crocker   2023  2:30 PM Marline Shelby MD  [facility]   2023  2:10 PM Anca Zhao MD [facility] Juan Acosta   2023  2:30 PM Rose Owens MD [address] [place] [address]

## 2023-03-22 RX ORDER — SERTRALINE HYDROCHLORIDE 100 MG/1
100 TABLET, FILM COATED ORAL DAILY
Qty: 90 TABLET | Refills: 0 | Status: CANCELLED | OUTPATIENT
Start: 2023-03-22

## 2023-03-22 NOTE — TELEPHONE ENCOUNTER
Venezuelan Language Leonila [facility] ID # [id number]. Left message on mobile and home phone to call back.

## 2023-03-22 NOTE — TELEPHONE ENCOUNTER
Pt returned call. Pt states medication is called bupropion 150mg once a day. Pt states when she opened the bottle it had a foul smell and couldn't tolerate it. Pt reports nausea after taking it. Pt requesting for sertraline. Rx pended.

## 2023-03-23 ENCOUNTER — TELEPHONE (OUTPATIENT)
Dept: FAMILY MEDICINE CLINIC | Facility: CLINIC | Age: 63
End: 2023-03-23

## 2023-03-23 NOTE — TELEPHONE ENCOUNTER
[employer] message sent notifying pt they are due for a physical. They can either schedule through [employer] or call the office

## 2023-03-29 NOTE — TELEPHONE ENCOUNTER
Please review; protocol failed. Or has no protocol    Requested Prescriptions   Pending Prescriptions Disp Refills    SOLIFENACIN SUCCINATE 5 MG Oral Tab [Pharmacy Med Name: SOLIFENACIN 5 MG TABLET] 90 tablet 1     Sig: Take 1 tablet (5 mg total) by mouth daily.        Genitourinary Medications Failed - 3/28/2023 12:16 AM        Failed - Patient does not have pulmonary hypertension on problem list        Passed - In person appointment or virtual visit in the past 12 mos or appointment in next 3 mos     Recent Outpatient Visits              3 weeks ago Dyslipidemia    Rogelio Ruggiero MD    Office Visit    4 weeks ago Other depression    Laura Marks MD    Office Visit    1 month ago Hermila Blair, Gloria Nolasco, Leigh De La Garza MD    Telemedicine    4 months ago Other depression    Laura Marks MD    Office Visit    5 months ago Primary osteoarthritis, unspecified site    [address], [address], Jolly Sarabia MD    Office Visit          Future Appointments         Provider Department Appt Notes    In 1 month Ina Schlatter, MD [address], [address], [place] 6 mo f/u    In 1 month Maritza Lujan MD [address], [facility] [place] follow up    In 2 months Helene Perla MD [address], [address], [place]                      Recent Outpatient Visits              3 weeks ago Dyslipidemia    [address] [facility], Phil Choi MD    Office Visit    4 weeks ago Other depression    Laura Marks MD    Office Visit    1 month ago Gloria Capps, Leigh De La Garza MD Telemedicine    4 months ago Other depression    Grace Lisa MD    Office Visit    5 months ago Primary osteoarthritis, unspecified site    Thiago Michelle, Timoteo Chaparro MD    Office Visit           Future Appointments         Provider Department Appt Notes    In 1 month Vaishnavi Allen MD [address], [address], [place] 6 mo f/u    In 1 month Karen Shook MD [address], [facility] follow up    In 2 months Mikala Paula MD [address], [address], [place]

## 2023-03-31 RX ORDER — SOLIFENACIN SUCCINATE 5 MG/1
5 TABLET, FILM COATED ORAL DAILY
Qty: 90 TABLET | Refills: 1 | Status: SHIPPED | OUTPATIENT
Start: 2023-03-31

## 2023-04-13 NOTE — TELEPHONE ENCOUNTER
Refill passed per [place] [employer] protocol.     Requested Prescriptions   Pending Prescriptions Disp Refills    METFORMIN 500 MG Oral Tab [Pharmacy Med Name: METFORMIN  MG TABLET] 180 tablet 1     Sig: TOME GINNY TABLETA TODOS LOS FOSTER FOR 7 DAYS, THEN TOME GINNY TABLETA POR VIA ORAL DOS VECES AL BERNARDINO       Diabetes Medication Protocol Passed - 4/13/2023 12:43 AM        Passed - Last A1C < 7.5 and within past 6 months     Lab Results   Component Value Date    A1C 6.2 (A) 10/21/2022               Passed - In person appointment or virtual visit in the past 6 mos or appointment in next 3 mos     Recent Outpatient Visits              1 month ago Dyslipidemia    Tom Roth MD    Office Visit    1 month ago Other depression    Iman Bynum MD    Office Visit    1 month ago Jazmin Pruett, Dennis Lesch, [place], Allen Max MD    Telemedicine    4 months ago Other depression    Jeanne Bryant, Adwoa Zarate MD    Office Visit    5 months ago Primary osteoarthritis, unspecified site    [address], [address], Akash Chamberlain MD    Office Visit          Future Appointments         Provider Department Appt Notes    In 2 weeks Geovanna Muniz MD [address], [address], [place] 6 mo f/u    In 3 weeks Naman Novak MD [address], [facility]    In 1 month Mimi Beltran MD [address], [address], [address] or GFRNAA > 50     GFR Evaluation  GFRNAA: 62 , resulted on 7/5/2022            Passed - GFR in the past 12 months           Recent Outpatient Visits              1 month ago Dyslipidemia    [address] [facility], Frances Gannon MD    Office Visit    1 month ago Other depression    Shasta Hamlin MD    Office Visit    1 month ago [facility], Krysten Nogueira MD    Telemedicine    4 months ago Other depression    Shasta Hamlin MD    Office Visit    5 months ago Primary osteoarthritis, unspecified site    Nissa Ordaz MD    Office Visit          Future Appointments         Provider Department Appt Notes    In 2 weeks Carl Howard MD [address], [address], [place] 6 mo f/u    In 3 weeks Mohan Thrasher MD [address], [facility] follow up    In 1 month Nora Angel MD [address], [facility]

## 2023-04-17 RX ORDER — LORATADINE 10 MG/1
10 TABLET ORAL DAILY
Qty: 90 TABLET | Refills: 3 | Status: SHIPPED | OUTPATIENT
Start: 2023-04-17

## 2023-04-17 NOTE — TELEPHONE ENCOUNTER
Refill passed per [place] [facility], [facility] protocol.       Requested Prescriptions   Pending Prescriptions Disp Refills    LORATADINE 10 MG Oral Tab [Pharmacy Med Name: LORATADINE 10 MG TABLET] 90 tablet 1     Sig: MADAY STANFORD FOSTER       Allergy Medication Protocol Passed - 4/16/2023  7:23 AM        Passed - In person appointment or virtual visit in the past 12 mos or appointment in next 3 mos     Recent Outpatient Visits              1 month ago Dyslipidemia    Ajay Mcintyre MD    Office Visit    1 month ago Other depression    Kylie Dixon MD    Office Visit    1 month ago Samm Villanueva, [address] [place], [place] [address]car[place], Sharan Arias MD    Telemedicine    5 months ago Other depression    [address],[facility], 148 [facility] Tracy Valenzuela MD    Office Visit    5 months ago Primary osteoarthritis, unspecified site    [address] [place] [address] Jose Armando Haque MD    Office Visit          Future Appointments         Provider Department Appt Notes    In 1 week Dillan Garcia MD [address] [place], [place] [address] 6 mo f/u    In 3 weeks Tracy Valenzuela MD [address], Ori follow up    In 1 month Judy Cottrell MD [address], [address] [facility],[address], [address]                      Future Appointments         Provider Department Appt Notes    In 1 week Dillan Garcia MD [address] [place], [place] [address] 6 mo f/u    In 3 weeks Tracy [facility]uel[facility] [address] [facility], Ori follow up    In 1 month Judy Cottrell MD [address], [address] [facility],[address], [address] Visits              1 month ago Dyslipidemia    Ajay Mcintyre, [place] Office Visit    1 month ago Other depression    Shantel Samayoa MD    Office Visit    1 month ago Gloria Lorenzo, Adri Tirado MD    Telemedicine    5 months ago Other depression    Shantel Samayoa MD    Office Visit    5 months ago Primary osteoarthritis, unspecified site    Gabe Brandon MD    Office Visit

## 2023-04-22 NOTE — PROGRESS NOTES
Omayra Choudhary is a 69-year-old woman with diffuse myofascial pain, depression, and osteoarthritis, who I last saw 10/24/2022. A  was on the line with us. She has continued Wellbutrin 150 mg a day for depression and anxiety, which is helping. Tylenol helps when she takes it. She is not taking 1000 mg 3 times a day. She has a lot of pain in her hands. Her left knee and left shin are painful. There is a swelling in her proximal left lateral leg. It does not go to her ankle or foot. She is taking nortriptyline 10 mg at bedtime. It does not cause drowsiness into the morning. It helps her sleep. She can still toss and turn with pain at night. Her energy is up and down. It does not cause drowsiness into the morning. Review of Systems   Constitutional: Positive for fatigue. Negative for chills, diaphoresis and fever. Respiratory: Negative for shortness of breath. Cardiovascular: Negative for chest pain. Gastrointestinal: Negative for abdominal pain. Skin: Negative for rash. Hematological: Negative for adenopathy. Allergies:No Known Allergies     PHYSICAL EXAM:   Blood pressure 144/79, pulse 58, height 5' 1.2\" (1.554 m), weight 218 lb (98.9 kg). Constitutional: She is oriented to person, place, and time. She appears well-developed and well-nourished. HENT:   Head: Normocephalic. Eyes: Conjunctivae are normal.   Heart: Regular S1 and S2.  Lungs: Clear breath sounds. Abdomen: Nontender. Musculoskeletal:    She walks without a limp. There is mild tenderness to palpation of the muscles across her upper back and shoulders. Her knees flex and extend fully, with crepitance and pain. 'Fullness' left proximal and lateral shin area. Her ankles and feet are not swollen. No edema. She has Heberden's and Maritza's nodes across her hands. Neurological: She is alert and oriented to person, place, and time. Psychiatric: She has a normal mood and affect.      ASSESSMENT/PLAN: 1. Osteoarthritis. She will try to do better with her exercise. Tylenol, 1000 mg 3 times a day regularly. Occasional doses help. 2. Fibromyalgia. Amitriptyline anad gabapentin 300 at bedtime made her too drowsy into the AM. Continue Wellbutrin, which is working for depression and anxiety. Exercise. Will increase nortriptyline to 20 mg at bedtime. 10 mg helps, but she continues to struggle. 3. Depression. Wellbutrin. 4.  Bone health. She will take 1500 mg of calcium and 1000 units of vitamin D daily. She will f/u in 2 months.

## 2023-04-28 ENCOUNTER — OFFICE VISIT (OUTPATIENT)
Dept: RHEUMATOLOGY | Facility: CLINIC | Age: 63
End: 2023-04-28

## 2023-04-28 VITALS
HEIGHT: 61.2 IN | DIASTOLIC BLOOD PRESSURE: 79 MMHG | HEART RATE: 58 BPM | WEIGHT: 218 LBS | SYSTOLIC BLOOD PRESSURE: 144 MMHG | BODY MASS INDEX: 41.16 KG/M2

## 2023-04-28 DIAGNOSIS — M79.7 FIBROMYALGIA: ICD-10-CM

## 2023-04-28 DIAGNOSIS — M19.91 PRIMARY OSTEOARTHRITIS, UNSPECIFIED SITE: Primary | ICD-10-CM

## 2023-04-28 PROCEDURE — 99213 OFFICE O/P EST LOW 20 MIN: CPT | Performed by: INTERNAL MEDICINE

## 2023-04-28 PROCEDURE — 3008F BODY MASS INDEX DOCD: CPT | Performed by: INTERNAL MEDICINE

## 2023-04-28 PROCEDURE — 3078F DIAST BP <80 MM HG: CPT | Performed by: INTERNAL MEDICINE

## 2023-04-28 PROCEDURE — 3077F SYST BP >= 140 MM HG: CPT | Performed by: INTERNAL MEDICINE

## 2023-05-09 ENCOUNTER — OFFICE VISIT (OUTPATIENT)
Dept: FAMILY MEDICINE CLINIC | Facility: CLINIC | Age: 63
End: 2023-05-09

## 2023-05-09 ENCOUNTER — TELEPHONE (OUTPATIENT)
Dept: FAMILY MEDICINE CLINIC | Facility: CLINIC | Age: 63
End: 2023-05-09

## 2023-05-09 VITALS
OXYGEN SATURATION: 96 % | HEIGHT: 61.2 IN | HEART RATE: 73 BPM | RESPIRATION RATE: 18 BRPM | SYSTOLIC BLOOD PRESSURE: 128 MMHG | BODY MASS INDEX: 40.59 KG/M2 | WEIGHT: 215 LBS | DIASTOLIC BLOOD PRESSURE: 64 MMHG

## 2023-05-09 DIAGNOSIS — E11.69 DIABETES MELLITUS TYPE 2 IN OBESE (HCC): Primary | ICD-10-CM

## 2023-05-09 DIAGNOSIS — E66.9 DIABETES MELLITUS TYPE 2 IN OBESE (HCC): Primary | ICD-10-CM

## 2023-05-09 DIAGNOSIS — I10 ESSENTIAL HYPERTENSION: ICD-10-CM

## 2023-05-09 DIAGNOSIS — F32.89 OTHER DEPRESSION: ICD-10-CM

## 2023-05-09 LAB
CARTRIDGE LOT#: ABNORMAL NUMERIC
HEMOGLOBIN A1C: 6.2 % (ref 4.3–5.6)

## 2023-05-09 PROCEDURE — 3008F BODY MASS INDEX DOCD: CPT | Performed by: FAMILY MEDICINE

## 2023-05-09 PROCEDURE — 3074F SYST BP LT 130 MM HG: CPT | Performed by: FAMILY MEDICINE

## 2023-05-09 PROCEDURE — 3044F HG A1C LEVEL LT 7.0%: CPT | Performed by: FAMILY MEDICINE

## 2023-05-09 PROCEDURE — 83036 HEMOGLOBIN GLYCOSYLATED A1C: CPT | Performed by: FAMILY MEDICINE

## 2023-05-09 PROCEDURE — 99214 OFFICE O/P EST MOD 30 MIN: CPT | Performed by: FAMILY MEDICINE

## 2023-05-09 PROCEDURE — 3078F DIAST BP <80 MM HG: CPT | Performed by: FAMILY MEDICINE

## 2023-05-09 NOTE — TELEPHONE ENCOUNTER
Patient calling to ask if grey/black [place] sweater was found in room 19, as patient left sweater behind after appointment.

## 2023-05-22 RX ORDER — LEVOTHYROXINE SODIUM 0.1 MG/1
100 TABLET ORAL DAILY
Qty: 90 TABLET | Refills: 3 | Status: SHIPPED | OUTPATIENT
Start: 2023-05-22

## 2023-05-22 RX ORDER — FENOFIBRATE 160 MG/1
160 TABLET ORAL DAILY
Qty: 90 TABLET | Refills: 3 | Status: SHIPPED | OUTPATIENT
Start: 2023-05-22

## 2023-05-22 NOTE — TELEPHONE ENCOUNTER
Refill passed per [place] [facility] [employer] protocol.     Requested Prescriptions   Pending Prescriptions Disp Refills    LEVOTHYROXINE 100 MCG Oral Tab [Pharmacy Med Name: LEVOTHYROXINE 100 MCG TABLET] 90 tablet 1     Sig: TOME GINNY TABLETA TODOS [employer] FOSTER       Thyroid Medication Protocol Passed - 5/21/2023  7:22 AM        Passed - TSH in past 12 months        Passed - Last TSH value is normal     Lab Results   Component Value Date    TSH 2.700 06/15/2022                 Passed - In person appointment or virtual visit in the past 12 mos or appointment in next 3 mos     Recent Outpatient Visits              1 week ago Diabetes mellitus type 2 in obese [facility])    [address], [address] Jermaine Torres MD    Office Visit    3 weeks ago Primary osteoarthritis, unspecified site    [address], [address], Elvira Goldberg MD    Office Visit    2 months ago Dyslipidemia    [address], [address], Narciso Torres MD    Office Visit    2 months ago Other depression    [address], [address] Jermaine Torres MD    Office Visit    3 months ago Gloria Lozano, Chinmay Casillas MD    Telemedicine          Future Appointments         Provider Department Appt Notes    In 1 week Wyatt Varghese MD [address], [address], [facility]     In 1 month Veronica Landis MD [address], [address], [facility] 2 mo f/u    In 5 months Concepcion Hanson MD [address], [address] [place] 5 [facility]                 FENOFIBRATE 160 MG Oral Tab [Pharmacy Med Name: FENOFIBRATE 160 MG TABLET] 90 tablet 1     Sig: TOME GINNY TABLETA TODOS [employer] FOSTER       Cholesterol Medication Protocol Passed - 5/21/2023  7:22 AM        Passed - ALT in past 12 months        Passed - LDL in past 12 months        Passed - Last ALT < 80     Lab Results Component Value Date    ALT 26 06/15/2022             Passed - Last LDL < 130     Lab Results   Component Value Date    LDL 99 06/15/2022               Passed - In person appointment or virtual visit in the past 12 mos or appointment in next 3 mos     Recent Outpatient Visits              1 week ago Diabetes mellitus type 2 in [facility])    [address], [address] Chandan Torres MD    Office Visit    3 weeks ago Primary osteoarthritis, unspecified site    [address], [address], Marley Sarabia MD    Office Visit    2 months ago Dyslipidemia    [address], [address], Roque Tate MD    Office Visit    2 months ago Other depression    [address], 148 [facility] Chandan Ricci MD    Office Visit    3 months ago Markel Arroyo, [address] [place] [address] Radhames Tilden, Kelsi Donnelly MD    Telemedicine          Future Appointments         Provider Department Appt Notes    In 1 week Sina Carlin MD [address], [address], [place]     In 1 month Jia Mahoney MD [address], [address], [place] 2 mo f/u    In 5 months Owen Darnell MD [address], [address] [place], [place] 5 [facility]                  Future Appointments         Provider Department Appt Notes    In 1 week Sina Carlin MD [address], [address], [place]     In 1 month Jia Mahoney MD [address], [address], [place] 2 mo f/u    In 5 months Owen Darnell MD [address], [facility] 5 [facility] Visits              1 week ago Diabetes mellitus type 2 in [facility])    Brett Jimenez MD    Office Visit    3 weeks ago Primary osteoarthritis, unspecified site    Robert Mckeon Aicha Kebede MD    Office Visit    2 months ago Dyslipidemia    Aiden Gonzalez MD    Office Visit    2 months ago Other depression    Yasmeen Mello MD    Office Visit    3 months ago Gloria Tobias, Charan Cerda MD    Telemedicine

## 2023-05-23 DIAGNOSIS — R49.0 HOARSENESS: ICD-10-CM

## 2023-05-24 RX ORDER — PANTOPRAZOLE SODIUM 40 MG/1
40 TABLET, DELAYED RELEASE ORAL
Qty: 90 TABLET | Refills: 3 | Status: SHIPPED | OUTPATIENT
Start: 2023-05-24

## 2023-05-24 NOTE — TELEPHONE ENCOUNTER
Refill passed per [place] [facility] protocol.    Requested Prescriptions   Pending Prescriptions Disp Refills    PANTOPRAZOLE 40 MG Oral Tab EC [Pharmacy Med Name: PANTOPRAZOLE SOD DR 40 MG TAB] 90 tablet 1     Sig: TOME GINNY TABLETA POR VIA ORAL BEFORE BREAKFAST       Gastrointestional Medication Protocol Passed - 5/23/2023  2:25 PM        Passed - In person appointment or virtual visit in the past 12 mos or appointment in next 3 mos     Recent Outpatient Visits              2 weeks ago Diabetes mellitus type 2 in obese [facility])    Vidhya Kong MD    Office Visit    3 weeks ago Primary osteoarthritis, unspecified site    [id number] [facility]    Office Visit    2 months ago Dyslipidemia    [id number] [place] [address] Cyrus Bear MD    Office Visit    2 months ago Other depression    Vidhya Kong MD    Office Visit    3 months ago Gloria Gibbons, Edwin Loera MD    Telemedicine          Future Appointments         Provider Department Appt Notes    In 6 days Zabrina Reynoso MD [address], [address], [place]     In 1 month Maxwell Bills MD [address], [address], [place] 2 mo f/u    In 5 months Charmaine Thrasher MD [address] 5 [facility]                     Recent Outpatient Visits              2 weeks ago Diabetes mellitus type 2 in obese [facility])    Vidhya Kong MD    Office Visit    3 weeks ago Primary osteoarthritis, unspecified site    [address] Bear MD    Office Visit    2 months ago Dyslipidemia    [id number] [place], [place] [address]Cyrus Burleson MD Office Visit    2 months ago Other depression    Laura Marks MD    Office Visit    3 months ago Gloria Capps, Leigh De La Garza MD    Telemedicine            Future Appointments         Provider Department Appt Notes    In 6 days Helene Perla MD [address], [address], [place]     In 1 month Ina Schlatter, MD [address], [address], [place] 2 mo f/u    In 5 months Maritza Lujan MD [address], [address]

## 2023-05-28 RX ORDER — SIMVASTATIN 20 MG
TABLET ORAL
Qty: 90 TABLET | Refills: 1 | OUTPATIENT
Start: 2023-05-28

## 2023-05-30 ENCOUNTER — OFFICE VISIT (OUTPATIENT)
Dept: SURGERY | Facility: CLINIC | Age: 63
End: 2023-05-30
Payer: COMMERCIAL

## 2023-05-30 VITALS
BODY MASS INDEX: 40.78 KG/M2 | SYSTOLIC BLOOD PRESSURE: 133 MMHG | HEART RATE: 66 BPM | DIASTOLIC BLOOD PRESSURE: 78 MMHG | OXYGEN SATURATION: 97 % | WEIGHT: 216 LBS | HEIGHT: 61.2 IN

## 2023-05-30 DIAGNOSIS — E66.01 MORBID OBESITY WITH BMI OF 40.0-44.9, ADULT (HCC): ICD-10-CM

## 2023-05-30 DIAGNOSIS — E11.9 TYPE 2 DIABETES MELLITUS WITHOUT COMPLICATION, WITHOUT LONG-TERM CURRENT USE OF INSULIN (HCC): Primary | ICD-10-CM

## 2023-05-30 DIAGNOSIS — E78.5 DYSLIPIDEMIA: ICD-10-CM

## 2023-05-30 DIAGNOSIS — G47.33 OSA ON CPAP: ICD-10-CM

## 2023-05-30 DIAGNOSIS — Z99.89 OSA ON CPAP: ICD-10-CM

## 2023-05-30 PROCEDURE — 3075F SYST BP GE 130 - 139MM HG: CPT | Performed by: INTERNAL MEDICINE

## 2023-05-30 PROCEDURE — 3008F BODY MASS INDEX DOCD: CPT | Performed by: INTERNAL MEDICINE

## 2023-05-30 PROCEDURE — 99215 OFFICE O/P EST HI 40 MIN: CPT | Performed by: INTERNAL MEDICINE

## 2023-05-30 PROCEDURE — 3078F DIAST BP <80 MM HG: CPT | Performed by: INTERNAL MEDICINE

## 2023-05-30 RX ORDER — PHENTERMINE HYDROCHLORIDE 8 MG/1
1 TABLET ORAL DAILY
Qty: 90 TABLET | Refills: 1 | Status: SHIPPED | OUTPATIENT
Start: 2023-05-30 | End: 2023-08-28

## 2023-05-30 RX ORDER — SEMAGLUTIDE 0.68 MG/ML
0.25 INJECTION, SOLUTION SUBCUTANEOUS WEEKLY
Qty: 3 ML | Refills: 1 | Status: SHIPPED | OUTPATIENT
Start: 2023-05-30 | End: 2023-05-30

## 2023-05-30 RX ORDER — SEMAGLUTIDE 0.68 MG/ML
0.25 INJECTION, SOLUTION SUBCUTANEOUS WEEKLY
Qty: 3 ML | Refills: 1 | Status: SHIPPED | OUTPATIENT
Start: 2023-05-30 | End: 2023-06-29

## 2023-05-31 ENCOUNTER — TELEPHONE (OUTPATIENT)
Dept: SURGERY | Facility: CLINIC | Age: 63
End: 2023-05-31

## 2023-06-01 NOTE — TELEPHONE ENCOUNTER
Please review. Has no protocol.     Marked as discontinued 10/24/22, then reordered 12/2022, and marked discontinued 2/28/23  Refills on 12/5/22 and 3/5/23     Requested Prescriptions   Pending Prescriptions Disp Refills    QUETIAPINE ER 50 MG Oral Tablet 24 Hr [Pharmacy Med Name: QUETIAPINE ER 50 MG TABLET] 90 tablet 1     Sig: TOME GINNY TABLETA TODOS LOS FOSTER AT NIGHT       There is no refill protocol information for this order        Future Appointments         Provider Department Appt Notes    In 3 weeks MD Lili Cunha, [address] 2 mo f/u    In 4 months MD Lili George, [place] [employer] PPO  NON SX F/U    In 4 months Tracy Valenzuela MD [address] [place] 5 [facility]           Recent Outpatient Visits              2 days ago Type 2 diabetes mellitus without complication, without long-term current use of insulin ([address]    Michaelene Finch, Daymon Gaucher, MD    Office Visit    3 weeks ago Diabetes mellitus type 2 in obese [facility])    Kylie Dixon MD    Office Visit    1 month ago Primary osteoarthritis, unspecified site    Jose Armando Cavazos MD    Office Visit    3 months ago Dyslipidemia    Michaelene Finch, Daymon Gaucher, MD    Office Visit    3 months ago Other depression    Kylie Dixon MD    Office Visit

## 2023-06-02 RX ORDER — QUETIAPINE FUMARATE 50 MG/1
TABLET, EXTENDED RELEASE ORAL
Qty: 90 TABLET | Refills: 1 | Status: SHIPPED | OUTPATIENT
Start: 2023-06-02

## 2023-06-16 NOTE — PROGRESS NOTES
Daisy Pratt is a 69-year-old woman with diffuse myofascial pain, depression, and osteoarthritis, who I last saw 4/28/2023. A  was again on the line with us. She has continued Wellbutrin 150 mg a day for depression and anxiety, which is helping. Tylenol helps when she takes it. She is not taking 1000 mg 3 times a day. She has a lot of pain in her hands. Her left knee and left shin are painful. She is taking nortriptyline 10 mg, and occasionally 20 mg, at bedtime. It causes drowsiness into the morning. It helps her sleep. She can still toss and turn with pain at night. Her energy is up and down. She has been started on sertraline 25 to 50 mg at bedtime. She has spurs across her finger DIP joints. She exercises very little on weekends. Review of Systems   Constitutional: Positive for fatigue. Negative for chills, diaphoresis and fever. Respiratory: Negative for shortness of breath. Cardiovascular: Negative for chest pain. Gastrointestinal: Negative for abdominal pain. Skin: Negative for rash. Hematological: Negative for adenopathy. Allergies:No Known Allergies     PHYSICAL EXAM:   Blood pressure 137/69, pulse 68, resp. rate 16, weight 215 lb (97.5 kg). Constitutional: She is oriented to person, place, and time. She appears well-developed and well-nourished. HENT:   Head: Normocephalic. Eyes: Conjunctivae are normal.   Heart: Regular S1 and S2.  Lungs: Clear breath sounds. Abdomen: Nontender. Musculoskeletal:    She walks without a limp. There is tenderness to palpation across her upper back and shoulders. Her knees flex and extend fully, with crepitance and pain. Her ankles and feet are not swollen. No edema. She has Heberden's and Maritza's nodes across her hands. Neurological: She is alert and oriented to person, place, and time. Psychiatric: She has a normal mood and affect. ASSESSMENT/PLAN     1. Osteoarthritis.  She will try to do better with her exercise. Tylenol, up to 1000 mg 3 times a day. Occasional doses help. 2. Fibromyalgia. Amitriptyline anad gabapentin 300 at bedtime made her too drowsy into the AM. Continue Wellbutrin, which is working for depression and anxiety. Exercise. She will try nortriptyline earlier in the evening, so that it doesn't make her drowsy into the next day. 3. Depression. Wellbutrin. 4.  Bone health. She will take 1500 mg of calcium and 1000 units of vitamin D daily. She will f/u in Rheumatology in 6 months.

## 2023-06-23 ENCOUNTER — OFFICE VISIT (OUTPATIENT)
Dept: RHEUMATOLOGY | Facility: CLINIC | Age: 63
End: 2023-06-23

## 2023-06-23 VITALS
RESPIRATION RATE: 16 BRPM | WEIGHT: 215 LBS | SYSTOLIC BLOOD PRESSURE: 137 MMHG | DIASTOLIC BLOOD PRESSURE: 69 MMHG | HEART RATE: 68 BPM | BODY MASS INDEX: 40 KG/M2

## 2023-06-23 DIAGNOSIS — M79.7 FIBROMYALGIA: ICD-10-CM

## 2023-06-23 DIAGNOSIS — M19.91 PRIMARY OSTEOARTHRITIS, UNSPECIFIED SITE: Primary | ICD-10-CM

## 2023-06-23 PROCEDURE — 3075F SYST BP GE 130 - 139MM HG: CPT | Performed by: INTERNAL MEDICINE

## 2023-06-23 PROCEDURE — 3078F DIAST BP <80 MM HG: CPT | Performed by: INTERNAL MEDICINE

## 2023-06-23 PROCEDURE — 99213 OFFICE O/P EST LOW 20 MIN: CPT | Performed by: INTERNAL MEDICINE

## 2023-07-04 RX ORDER — SIMVASTATIN 20 MG
TABLET ORAL
Qty: 90 TABLET | Refills: 1 | OUTPATIENT
Start: 2023-07-04

## 2023-07-04 NOTE — TELEPHONE ENCOUNTER
REFILL REQUEST DENIED due to dose changed. OFFICE VISIT note 2/28/23; Meds This Visit:  Requested Prescriptions             Signed Prescriptions Disp Refills    simvastatin 40 MG Oral Tab 90 tablet 1       Sig: Take 1 tablet (40 mg total) by mouth nightly.

## 2023-07-07 DIAGNOSIS — E11.9 TYPE 2 DIABETES MELLITUS WITHOUT COMPLICATION, WITHOUT LONG-TERM CURRENT USE OF INSULIN (HCC): ICD-10-CM

## 2023-07-07 RX ORDER — SEMAGLUTIDE 0.68 MG/ML
0.25 INJECTION, SOLUTION SUBCUTANEOUS WEEKLY
Qty: 3 ML | Refills: 1 | Status: SHIPPED | OUTPATIENT
Start: 2023-07-07 | End: 2023-08-06

## 2023-07-08 RX ORDER — SIMVASTATIN 20 MG
20 TABLET ORAL NIGHTLY
Qty: 90 TABLET | Refills: 1 | OUTPATIENT
Start: 2023-07-08

## 2023-07-10 NOTE — TELEPHONE ENCOUNTER
Dr Kelsey Mata please advise if pt is to take Simvastatin 20 or 40 mg? Last lipid panel advised to continue to take Simvastatin 20 mg. During 2/28/23 Simvastatin 40 mg entered.

## 2023-07-10 NOTE — TELEPHONE ENCOUNTER
Patient called to follow up on refill request - see 7/3 and 7/7 encounters. CSS reminded patient of dose being 40 mg not 20 mg and record shows script was printed back in February. Patient states [facility] pharmacy does not have refills on file. Asking to send script. Pt is out of medication. Please call pt once medication has been sent to Pharmacy. Maur[place] speaking. Medication Detail    Medication Quantity Refills Start End   simvastatin 40 MG Oral Tab 90 tablet 1 2/28/2023    Sig:   Take 1 tablet (40 mg total) by mouth nightly.      Route:   Oral     Class:   Print Script     Order #:   [id number]

## 2023-07-11 RX ORDER — SIMVASTATIN 20 MG
20 TABLET ORAL NIGHTLY
Qty: 90 TABLET | Refills: 1 | OUTPATIENT
Start: 2023-07-11

## 2023-07-12 RX ORDER — SIMVASTATIN 40 MG
40 TABLET ORAL NIGHTLY
Qty: 90 TABLET | Refills: 0 | Status: SHIPPED
Start: 2023-07-12

## 2023-07-12 RX ORDER — SIMVASTATIN 40 MG
40 TABLET ORAL NIGHTLY
Qty: 90 TABLET | Refills: 1 | OUTPATIENT
Start: 2023-07-12

## 2023-07-12 NOTE — TELEPHONE ENCOUNTER
To pharmacy: Needs to make an apt for further refills   Call center please call and schedule an appointment. Thank You.

## 2023-07-12 NOTE — TELEPHONE ENCOUNTER
Spoke, with the patient and informed her of the message below. Pt has an appointment scheduled with Dr. Iris Laurent on 10-24-23 for follow up. Does, the doctor need to see the patient sooner?

## 2023-07-12 NOTE — TELEPHONE ENCOUNTER
Duplicate request, previously addressed. simvastatin 40 MG Oral Tab 90 tablet 0 7/12/2023     Sig - Route: Take 1 tablet (40 mg total) by mouth nightly.  - Oral    Sent to pharmacy as: Simvastatin 40 MG Oral Tablet (Zocor)    Notes to Pharmacy: Needs to make an apt for further refills    E-Prescribing Status: Transmission to pharmacy in progress (7/12/2023 11:20 AM CDT)

## 2023-07-12 NOTE — TELEPHONE ENCOUNTER
Dr. Traci Kanner, patient is scheduled on 10/24/23 would you like to see patient sooner? [place] Tobar from phone room also noted simvastatin that was sent to pharmacy today stated \"transmission to pharmacy in progress. States patient is on her way to  script and just wanted to make sure pharmacy received script. Pharmacy contacted. Spoke to Ehsan from pharmacy and confirmed they did not receive script. Verbal provided to [place] with read back.

## 2023-08-04 DIAGNOSIS — F32.A DEPRESSION, UNSPECIFIED DEPRESSION TYPE: Primary | ICD-10-CM

## 2023-08-04 NOTE — TELEPHONE ENCOUNTER
Please review as warning interaction with nortriptyline    Refill passed per [place], [employer] protocol    Requested Prescriptions   Pending Prescriptions Disp Refills    sertraline 50 MG Oral Tab 90 tablet 0     Sig: Take 1 tab daily       Psychiatric Non-Scheduled (Anti-Anxiety) Passed - 8/4/2023 12:05 PM        Passed - In person appointment or virtual visit in the past 6 mos or appointment in next 3 mos     Recent Outpatient Visits              1 month ago Primary osteoarthritis, unspecified site    [address], [address], Marley Sarabia MD    Office Visit    2 months ago Type 2 diabetes mellitus without complication, without long-term current use of insulin ([facility]    Silver Moy MD    Office Visit    2 months ago Diabetes mellitus type 2 in obese [facility])    Brett Jimenez MD    Office Visit    3 months ago Primary osteoarthritis, unspecified site    [address], [address], Marley Sarabia MD    Office Visit    5 months ago Dyslipidemia    [facility], Roque Tate MD    Office Visit          Future Appointments         Provider Department Appt Notes    In 2 months Sina Carlin MD [facility], [place] [employer] PPO  NON SX F/U    In 2 months Owen Darnell MD [address], [address]    In 4 months Los Arnold MD [address], [address], [place] 6 mo f/u [facility] Department Appt Notes    In 2 months MD [address], [address], [place] [employer] PPO  NON SX F/U    In 2 months MD [address], [facility] 5 [facility]    In 4 months MD [address], [address], [place] 6 mo f/u RYDER orourke            Recent Outpatient Visits              1 month ago Primary osteoarthritis, unspecified site    [facility] Elvira Goldberg MD    Office Visit    2 months ago Type 2 diabetes mellitus without complication, without long-term current use of insulin ([facility] [address]    [address] [facility], Narciso Torres MD    Office Visit    2 months ago Diabetes mellitus type 2 in obese [facility])    Ashwin Hernandez MD    Office Visit    3 months ago Primary osteoarthritis, unspecified site    [address] [facility] Elvira Goldberg MD    Office Visit    5 months ago Dyslipidemia    [address] [facility], Narciso Torres MD    Office Visit

## 2023-08-04 NOTE — TELEPHONE ENCOUNTER
Refill request for the following medications:      sertraline 50 MG Oral Tab, TAKE 1/2 TABLET BY MOUTH EVERY DAY FOR 7 DAYS THEN 1 TABLET EVERY DAY, Disp: 90 tablet, Rfl: 0    :

## 2023-08-04 NOTE — TELEPHONE ENCOUNTER
Additional refill           simvastatin 40 MG Oral Tab, Take 1 tablet (40 mg total) by mouth nightly., Disp: 90 tablet, Rfl: 0

## 2023-08-08 RX ORDER — SIMVASTATIN 40 MG
40 TABLET ORAL NIGHTLY
Qty: 90 TABLET | Refills: 0 | Status: SHIPPED | OUTPATIENT
Start: 2023-08-08

## 2023-09-25 DIAGNOSIS — E11.9 TYPE 2 DIABETES MELLITUS WITHOUT COMPLICATION, WITHOUT LONG-TERM CURRENT USE OF INSULIN (HCC): ICD-10-CM

## 2023-09-25 RX ORDER — SEMAGLUTIDE 0.68 MG/ML
0.25 INJECTION, SOLUTION SUBCUTANEOUS WEEKLY
Qty: 3 ML | Refills: 1 | Status: SHIPPED | OUTPATIENT
Start: 2023-09-25 | End: 2023-10-25

## 2023-09-25 RX ORDER — SEMAGLUTIDE 0.68 MG/ML
0.25 INJECTION, SOLUTION SUBCUTANEOUS WEEKLY
Refills: 0 | OUTPATIENT
Start: 2023-09-25

## 2023-09-30 ENCOUNTER — TELEPHONE (OUTPATIENT)
Dept: FAMILY MEDICINE CLINIC | Facility: CLINIC | Age: 63
End: 2023-09-30

## 2023-09-30 DIAGNOSIS — Z12.31 ENCOUNTER FOR SCREENING MAMMOGRAM FOR BREAST CANCER: Primary | ICD-10-CM

## 2023-09-30 NOTE — TELEPHONE ENCOUNTER
Received fax from [facility] for mammogram order    Dr Darlene Flynn please review/sign.

## 2023-10-17 ENCOUNTER — OFFICE VISIT (OUTPATIENT)
Dept: SURGERY | Facility: CLINIC | Age: 63
End: 2023-10-17
Payer: COMMERCIAL

## 2023-10-17 VITALS
BODY MASS INDEX: 39.46 KG/M2 | HEIGHT: 61.2 IN | OXYGEN SATURATION: 97 % | DIASTOLIC BLOOD PRESSURE: 70 MMHG | HEART RATE: 65 BPM | WEIGHT: 209 LBS | SYSTOLIC BLOOD PRESSURE: 112 MMHG

## 2023-10-17 DIAGNOSIS — G47.33 OSA ON CPAP: ICD-10-CM

## 2023-10-17 DIAGNOSIS — R60.0 LOWER EXTREMITY EDEMA: ICD-10-CM

## 2023-10-17 DIAGNOSIS — E66.01 MORBID OBESITY WITH BMI OF 40.0-44.9, ADULT (HCC): ICD-10-CM

## 2023-10-17 DIAGNOSIS — E11.9 TYPE 2 DIABETES MELLITUS WITHOUT COMPLICATION, WITHOUT LONG-TERM CURRENT USE OF INSULIN (HCC): Primary | ICD-10-CM

## 2023-10-17 DIAGNOSIS — E78.5 DYSLIPIDEMIA: ICD-10-CM

## 2023-10-17 PROCEDURE — 99214 OFFICE O/P EST MOD 30 MIN: CPT | Performed by: INTERNAL MEDICINE

## 2023-10-17 PROCEDURE — 3074F SYST BP LT 130 MM HG: CPT | Performed by: INTERNAL MEDICINE

## 2023-10-17 PROCEDURE — 3078F DIAST BP <80 MM HG: CPT | Performed by: INTERNAL MEDICINE

## 2023-10-17 PROCEDURE — 3008F BODY MASS INDEX DOCD: CPT | Performed by: INTERNAL MEDICINE

## 2023-10-17 RX ORDER — SEMAGLUTIDE 0.68 MG/ML
0.5 INJECTION, SOLUTION SUBCUTANEOUS WEEKLY
Qty: 3 ML | Refills: 3 | Status: SHIPPED | OUTPATIENT
Start: 2023-10-17 | End: 2023-11-16

## 2023-10-24 ENCOUNTER — OFFICE VISIT (OUTPATIENT)
Dept: FAMILY MEDICINE CLINIC | Facility: CLINIC | Age: 63
End: 2023-10-24

## 2023-10-24 VITALS
HEIGHT: 61.2 IN | BODY MASS INDEX: 39.68 KG/M2 | HEART RATE: 60 BPM | WEIGHT: 210.19 LBS | DIASTOLIC BLOOD PRESSURE: 62 MMHG | OXYGEN SATURATION: 98 % | RESPIRATION RATE: 20 BRPM | SYSTOLIC BLOOD PRESSURE: 99 MMHG

## 2023-10-24 DIAGNOSIS — E11.69 DIABETES MELLITUS TYPE 2 IN OBESE: ICD-10-CM

## 2023-10-24 DIAGNOSIS — E66.9 DIABETES MELLITUS TYPE 2 IN OBESE: ICD-10-CM

## 2023-10-24 DIAGNOSIS — Z00.00 ANNUAL PHYSICAL EXAM: Primary | ICD-10-CM

## 2023-10-24 LAB
CARTRIDGE LOT#: ABNORMAL NUMERIC
HEMOGLOBIN A1C: 6.1 % (ref 4.3–5.6)

## 2023-10-24 PROCEDURE — 3008F BODY MASS INDEX DOCD: CPT | Performed by: FAMILY MEDICINE

## 2023-10-24 PROCEDURE — 3044F HG A1C LEVEL LT 7.0%: CPT | Performed by: FAMILY MEDICINE

## 2023-10-24 PROCEDURE — 90471 IMMUNIZATION ADMIN: CPT | Performed by: FAMILY MEDICINE

## 2023-10-24 PROCEDURE — 3078F DIAST BP <80 MM HG: CPT | Performed by: FAMILY MEDICINE

## 2023-10-24 PROCEDURE — 90677 PCV20 VACCINE IM: CPT | Performed by: FAMILY MEDICINE

## 2023-10-24 PROCEDURE — 3074F SYST BP LT 130 MM HG: CPT | Performed by: FAMILY MEDICINE

## 2023-10-24 PROCEDURE — 83036 HEMOGLOBIN GLYCOSYLATED A1C: CPT | Performed by: FAMILY MEDICINE

## 2023-10-24 PROCEDURE — 99214 OFFICE O/P EST MOD 30 MIN: CPT | Performed by: FAMILY MEDICINE

## 2023-10-24 NOTE — PROGRESS NOTES
Subjective:   Patient ID: Mike Tirado is a 58year old female. HPI  Patient here for f/u diabetes - doing well HbA1C is 6.1  Also seeing dr Surjit Swain for weight loss   As far as anxiety doing well   Feels too sleepy though with quetipane 50 mg nightly   History/Other:   Review of Systems    Constitutional: Negative. Negative for activity change, appetite change, diaphoresis and fatigue. Respiratory: Negative. Negative for apnea, cough, chest tightness and shortness of breath. Cardiovascular: Negative. Negative for chest pain, palpitations and leg swelling. Gastrointestinal: Negative. Negative for abdominal pain. Skin: Negative. Psychiatric/Behavioral: anxiety controlled       Current Outpatient Medications   Medication Sig Dispense Refill    semaglutide (OZEMPIC, 0.25 OR 0.5 MG/DOSE,) 2 MG/3ML Subcutaneous Solution Pen-injector Inject 0.5 mg into the skin once a week. 3 mL 3    sertraline 50 MG Oral Tab Take 1 tab daily 90 tablet 0    simvastatin 40 MG Oral Tab Take 1 tablet (40 mg total) by mouth nightly. 90 tablet 0    QUETIAPINE ER 50 MG Oral Tablet 24 Hr TOME GINNY TABLETA TODOS LOS FOSTER AT NIGHT 90 tablet 1    pantoprazole 40 MG Oral Tab EC Take 1 tablet (40 mg total) by mouth before breakfast. 90 tablet 3    levothyroxine 100 MCG Oral Tab Take 1 tablet (100 mcg total) by mouth daily. 90 tablet 3    Fenofibrate 160 MG Oral Tab Take 1 tablet (160 mg total) by mouth daily. 90 tablet 3    loratadine 10 MG Oral Tab Take 1 tablet (10 mg total) by mouth daily. 90 tablet 3    clotrimazole-betamethasone 1-0.05 % External Cream Apply a small amount to the corner of your mouth where the cracked lip is twice daily for 7-10 days. 15 g 0    propranolol 40 MG Oral Tab Take 1 tablet (40 mg total) by mouth 2 (two) times daily. 180 tablet 1    albuterol 108 (90 Base) MCG/ACT Inhalation Aero Soln Inhale 2 puffs into the lungs every 4 (four) hours as needed for Wheezing or Shortness of Breath.  6.7 each 1    fluticasone propionate 50 MCG/ACT Nasal Suspension 2 sprays by Nasal route daily. 16 mL 1    Spacer/Aero-Holding Chambers Does not apply Device Use with inhaler as needed 1 each 0    hydrocortisone (ANUSOL-HC) 2.5 % External Cream Place 1 each rectally 2 (two) times daily. 1 each 1    ERGOCALCIFEROL 1.25 MG (49400 UT) Oral Cap TOME GINNY CAPSULA POR VIA ORAL GINNY VES POR SEMANA 12 capsule 3    Nebulizer Does not apply Misc by Does not apply route. aspirin 81 MG Oral Tab Take 1 tablet (81 mg total) by mouth daily. Allergies:No Known Allergies    Objective:   Physical Exam  Constitutional:       Appearance: She is well-developed. Cardiovascular:      Rate and Rhythm: Normal rate and regular rhythm. Heart sounds: Normal heart sounds. Pulmonary:      Effort: Pulmonary effort is normal.      Breath sounds: Normal breath sounds. Neurological:      Mental Status: She is alert. Deep Tendon Reflexes: Reflexes are normal and symmetric.          Assessment & Plan:   Annual physical exam  (primary encounter diagnosis)  Prediabetes  Diabetes mellitus type 2 in obese   Cpm   Anxiety decrease quetiapine   F/u in  3 months   Orders Placed This Encounter      POC Glycohemoglobin [19676]      Comp Metabolic Panel (14)      Lipid Panel      Assay, Thyroid Stim Hormone      CBC With Differential With Platelet      Prevnar 20 (PCV20) [87412]      Meds This Visit:  Requested Prescriptions      No prescriptions requested or ordered in this encounter       Imaging & Referrals:  [address] - EXTERNAL  OP REFERRAL TO [address] [place] [address]

## 2023-11-02 ENCOUNTER — MED REC SCAN ONLY (OUTPATIENT)
Dept: FAMILY MEDICINE CLINIC | Facility: CLINIC | Age: 63
End: 2023-11-02

## 2023-11-22 DIAGNOSIS — E03.9 HYPOTHYROIDISM, UNSPECIFIED TYPE: Primary | ICD-10-CM

## 2023-11-24 NOTE — TELEPHONE ENCOUNTER
Patient returned call and was advised of the outstanding labs. She will go tomorrow and get them drawn. See pended script.

## 2023-11-24 NOTE — TELEPHONE ENCOUNTER
Using language line : Rowan Padilla ID number [id number]    Patient overdue for labs. Orders in system. Left message for patient to call back and discuss.

## 2023-11-25 ENCOUNTER — LAB ENCOUNTER (OUTPATIENT)
Dept: LAB | Age: 63
End: 2023-11-25
Attending: FAMILY MEDICINE
Payer: COMMERCIAL

## 2023-11-25 DIAGNOSIS — Z00.00 ANNUAL PHYSICAL EXAM: ICD-10-CM

## 2023-11-25 DIAGNOSIS — F32.A DEPRESSION, UNSPECIFIED DEPRESSION TYPE: ICD-10-CM

## 2023-11-25 LAB
ALBUMIN SERPL-MCNC: 4.4 G/DL (ref 3.2–4.8)
ALBUMIN/GLOB SERPL: 1.5 {RATIO} (ref 1–2)
ALP LIVER SERPL-CCNC: 47 U/L
ALT SERPL-CCNC: 12 U/L
ANION GAP SERPL CALC-SCNC: 7 MMOL/L (ref 0–18)
AST SERPL-CCNC: 22 U/L (ref ?–34)
BASOPHILS # BLD AUTO: 0.08 X10(3) UL (ref 0–0.2)
BASOPHILS NFR BLD AUTO: 0.8 %
BILIRUB SERPL-MCNC: 0.4 MG/DL (ref 0.2–1.1)
BUN BLD-MCNC: 19 MG/DL (ref 9–23)
BUN/CREAT SERPL: 24.1 (ref 10–20)
CALCIUM BLD-MCNC: 9.2 MG/DL (ref 8.7–10.4)
CHLORIDE SERPL-SCNC: 104 MMOL/L (ref 98–112)
CHOLEST SERPL-MCNC: 178 MG/DL (ref ?–200)
CO2 SERPL-SCNC: 28 MMOL/L (ref 21–32)
CREAT BLD-MCNC: 0.79 MG/DL
DEPRECATED RDW RBC AUTO: 40.9 FL (ref 35.1–46.3)
EGFRCR SERPLBLD CKD-EPI 2021: 83 ML/MIN/1.73M2 (ref 60–?)
EOSINOPHIL # BLD AUTO: 0.19 X10(3) UL (ref 0–0.7)
EOSINOPHIL NFR BLD AUTO: 1.9 %
ERYTHROCYTE [DISTWIDTH] IN BLOOD BY AUTOMATED COUNT: 12.5 % (ref 11–15)
FASTING PATIENT LIPID ANSWER: YES
FASTING STATUS PATIENT QL REPORTED: YES
GLOBULIN PLAS-MCNC: 2.9 G/DL (ref 2.8–4.4)
GLUCOSE BLD-MCNC: 87 MG/DL (ref 70–99)
HCT VFR BLD AUTO: 37.8 %
HDLC SERPL-MCNC: 57 MG/DL (ref 40–59)
HGB BLD-MCNC: 12.1 G/DL
IMM GRANULOCYTES # BLD AUTO: 0.05 X10(3) UL (ref 0–1)
IMM GRANULOCYTES NFR BLD: 0.5 %
LDLC SERPL CALC-MCNC: 101 MG/DL (ref ?–100)
LYMPHOCYTES # BLD AUTO: 4.05 X10(3) UL (ref 1–4)
LYMPHOCYTES NFR BLD AUTO: 39.8 %
MCH RBC QN AUTO: 28.5 PG (ref 26–34)
MCHC RBC AUTO-ENTMCNC: 32 G/DL (ref 31–37)
MCV RBC AUTO: 89.2 FL
MONOCYTES # BLD AUTO: 0.61 X10(3) UL (ref 0.1–1)
MONOCYTES NFR BLD AUTO: 6 %
NEUTROPHILS # BLD AUTO: 5.19 X10 (3) UL (ref 1.5–7.7)
NEUTROPHILS # BLD AUTO: 5.19 X10(3) UL (ref 1.5–7.7)
NEUTROPHILS NFR BLD AUTO: 51 %
NONHDLC SERPL-MCNC: 121 MG/DL (ref ?–130)
OSMOLALITY SERPL CALC.SUM OF ELEC: 290 MOSM/KG (ref 275–295)
PLATELET # BLD AUTO: 328 10(3)UL (ref 150–450)
POTASSIUM SERPL-SCNC: 4.2 MMOL/L (ref 3.5–5.1)
PROT SERPL-MCNC: 7.3 G/DL (ref 5.7–8.2)
RBC # BLD AUTO: 4.24 X10(6)UL
SODIUM SERPL-SCNC: 139 MMOL/L (ref 136–145)
TRIGL SERPL-MCNC: 110 MG/DL (ref 30–149)
TSI SER-ACNC: 14.7 MIU/ML (ref 0.55–4.78)
VLDLC SERPL CALC-MCNC: 18 MG/DL (ref 0–30)
WBC # BLD AUTO: 10.2 X10(3) UL (ref 4–11)

## 2023-11-25 PROCEDURE — 80053 COMPREHEN METABOLIC PANEL: CPT

## 2023-11-25 PROCEDURE — 85025 COMPLETE CBC W/AUTO DIFF WBC: CPT

## 2023-11-25 PROCEDURE — 80061 LIPID PANEL: CPT

## 2023-11-25 PROCEDURE — 84443 ASSAY THYROID STIM HORMONE: CPT

## 2023-11-25 PROCEDURE — 36415 COLL VENOUS BLD VENIPUNCTURE: CPT

## 2023-11-25 NOTE — TELEPHONE ENCOUNTER
Refill passed per [place], [facility] protocol    Requested Prescriptions   Pending Prescriptions Disp Refills    SERTRALINE 50 MG Oral Tab [Pharmacy Med Name: SERTRALINE HCL 50 MG TABLET] 90 tablet 0     [facility] Non-Scheduled (Anti-Anxiety) Passed - 2023 12:30 AM        Passed - In person appointment or virtual visit in the past 6 mos or appointment in next 3 mos     Recent Outpatient Visits              1 month ago Annual physical exam    Michelle Banks MD    Office Visit    1 month ago Type 2 diabetes mellitus without complication, without long-term current use of insulin ([facility]    [facility], Michael Segura MD    Office Visit    5 months ago Primary osteoarthritis, unspecified site    [address], [address], Jake Núñez MD    Office Visit    5 months ago Type 2 diabetes mellitus without complication, without long-term current use of insulin ([facility] [address]    Sarah Barnes MD    Office Visit    6 months ago Diabetes mellitus type 2 in [facility])    Michelle Banks MD    Office Visit          Future Appointments         Provider Department Appt Notes    In 1 week Jazzy Dacosta MD [address], [address], [place] colon ca screening    In 3 weeks [address] [facility]; [address] [facility] also send mammo results to     In 3 weeks B ROBERT RM1; [address]     In 3 weeks Cherise Abarca MD [address], [address] [facility], needs to be rescheduled in a recheck slot   6 mo f/u RYDER orourke    In 2 months Esteban Barbour MD [address], [address] [facility] 3 months follow up    In 3 months Ai Germán Franco MD [facility], [address], [place] [employer] PPO  NON SX F/U                    Future Appointments         Provider Department Appt Notes    In 1 week Navarro Gutierrez MD [address], [address], [place] colon ca screening    In 3 weeks LMB DEXA RM1; [address] also send mammo results to     In 3 weeks LMB ROBERT RM1; [address]     In 3 weeks Angela Mathew MD [facility], [address], [place], needs to be rescheduled in a recheck slot   6 mo f/u RYDER orourke    In 2 months Usha Shea MD [facility], [address] [facility] 3 months follow up    In 3 months Dawn Saunders MD [address], [address], [place] [employer] PPO  NON SX F/U            Recent Outpatient Visits              1 month ago Annual physical exam    Parveen Ford MD    Office Visit    1 month ago Type 2 diabetes mellitus without complication, without long-term current use of insulin ([address]    [facility]    Office Visit    5 months ago Primary osteoarthritis, unspecified site    [facility]    Office Visit    5 months ago Type 2 diabetes mellitus without complication, without long-term current use of insulin ([address]    [address] [place] Armond Story MD    Office Visit    6 months ago Diabetes mellitus type 2 in obese [facility])    Parveen Ford MD    Office Visit

## 2023-11-28 RX ORDER — SIMVASTATIN 40 MG
40 TABLET ORAL NIGHTLY
Qty: 30 TABLET | Refills: 0 | Status: SHIPPED | OUTPATIENT
Start: 2023-11-28

## 2023-11-29 RX ORDER — LEVOTHYROXINE SODIUM 0.15 MG/1
150 TABLET ORAL
Qty: 90 TABLET | Refills: 0 | Status: SHIPPED | OUTPATIENT
Start: 2023-11-29 | End: 2023-12-14

## 2023-11-29 NOTE — TELEPHONE ENCOUNTER
To pharmacy: Needs to make an apt for further refills   Call center please call and schedule an appointment. Thank You. Mychart message sent to the patient.

## 2023-11-30 ENCOUNTER — MED REC SCAN ONLY (OUTPATIENT)
Dept: FAMILY MEDICINE CLINIC | Facility: CLINIC | Age: 63
End: 2023-11-30

## 2023-12-05 ENCOUNTER — OFFICE VISIT (OUTPATIENT)
Dept: FAMILY MEDICINE CLINIC | Facility: CLINIC | Age: 63
End: 2023-12-05

## 2023-12-05 VITALS
WEIGHT: 212 LBS | HEIGHT: 61 IN | RESPIRATION RATE: 16 BRPM | DIASTOLIC BLOOD PRESSURE: 62 MMHG | SYSTOLIC BLOOD PRESSURE: 129 MMHG | OXYGEN SATURATION: 98 % | HEART RATE: 70 BPM | BODY MASS INDEX: 40.02 KG/M2

## 2023-12-05 DIAGNOSIS — Z12.11 SCREENING FOR COLON CANCER: ICD-10-CM

## 2023-12-05 DIAGNOSIS — Z12.31 SCREENING MAMMOGRAM FOR BREAST CANCER: Primary | ICD-10-CM

## 2023-12-05 PROCEDURE — 99214 OFFICE O/P EST MOD 30 MIN: CPT | Performed by: FAMILY MEDICINE

## 2023-12-05 PROCEDURE — 3078F DIAST BP <80 MM HG: CPT | Performed by: FAMILY MEDICINE

## 2023-12-05 PROCEDURE — 3074F SYST BP LT 130 MM HG: CPT | Performed by: FAMILY MEDICINE

## 2023-12-05 PROCEDURE — 3008F BODY MASS INDEX DOCD: CPT | Performed by: FAMILY MEDICINE

## 2023-12-05 RX ORDER — CLONAZEPAM 0.5 MG/1
0.5 TABLET ORAL 2 TIMES DAILY PRN
Qty: 45 TABLET | Refills: 1 | Status: SHIPPED | OUTPATIENT
Start: 2023-12-05

## 2023-12-05 NOTE — PROGRESS NOTES
Subjective:   Patient ID: Tyree Solis is a 58year old female. Here for f/u anxiety having a lot of problems and anxiety is worse   Also seroquel making her too sleepy         History/Other:   Review of Systems   Constitutional:  Positive for fatigue. Negative for activity change, appetite change and unexpected weight change. Respiratory:  Negative for cough, chest tightness and shortness of breath. Cardiovascular: Negative. Negative for chest pain and leg swelling. Gastrointestinal:         Bloated   Musculoskeletal:  Positive for back pain. Psychiatric/Behavioral:  Positive for dysphoric mood and sleep disturbance. Negative for agitation. The patient is nervous/anxious. Current Outpatient Medications   Medication Sig Dispense Refill    sertraline 50 MG Oral Tab Take 1.5 tablets (75 mg total) by mouth daily. 135 tablet 1    clonazePAM 0.5 MG Oral Tab Take 1 tablet (0.5 mg total) by mouth 2 (two) times daily as needed for Anxiety. 45 tablet 1    levothyroxine (SYNTHROID) 150 MCG Oral Tab Take 1 tablet (150 mcg total) by mouth before breakfast. 90 tablet 0    simvastatin 40 MG Oral Tab Take 1 tablet (40 mg total) by mouth nightly. 30 tablet 0    QUETIAPINE ER 50 MG Oral Tablet 24 Hr TOME GINNY TABLETA TODOS LOS FOSTER AT NIGHT 90 tablet 1    pantoprazole 40 MG Oral Tab EC Take 1 tablet (40 mg total) by mouth before breakfast. 90 tablet 3    levothyroxine 100 MCG Oral Tab Take 1 tablet (100 mcg total) by mouth daily. 90 tablet 3    Fenofibrate 160 MG Oral Tab Take 1 tablet (160 mg total) by mouth daily. 90 tablet 3    loratadine 10 MG Oral Tab Take 1 tablet (10 mg total) by mouth daily. 90 tablet 3    clotrimazole-betamethasone 1-0.05 % External Cream Apply a small amount to the corner of your mouth where the cracked lip is twice daily for 7-10 days. 15 g 0    propranolol 40 MG Oral Tab Take 1 tablet (40 mg total) by mouth 2 (two) times daily.  180 tablet 1    albuterol 108 (90 Base) MCG/ACT Inhalation Aero Soln Inhale 2 puffs into the lungs every 4 (four) hours as needed for Wheezing or Shortness of Breath. 6.7 each 1    fluticasone propionate 50 MCG/ACT Nasal Suspension 2 sprays by Nasal route daily. 16 mL 1    Spacer/Aero-Holding Chambers Does not apply Device Use with inhaler as needed 1 each 0    hydrocortisone (ANUSOL-HC) 2.5 % External Cream Place 1 each rectally 2 (two) times daily. 1 each 1    ERGOCALCIFEROL 1.25 MG (55782 UT) Oral Cap TOME GINNY CAPSULA POR VIA ORAL GINNY VES POR SEMANA 12 capsule 3    Nebulizer Does not apply Misc by Does not apply route. aspirin 81 MG Oral Tab Take 1 tablet (81 mg total) by mouth daily. semaglutide (OZEMPIC, 0.25 OR 0.5 MG/DOSE,) 2 MG/3ML Subcutaneous Solution Pen-injector Inject 0.5 mg into the skin once a week. 3 mL 3     Allergies:No Known Allergies    Objective:   Physical Exam  Constitutional:       Appearance: She is well-developed. Cardiovascular:      Rate and Rhythm: Normal rate and regular rhythm. Heart sounds: Normal heart sounds. No murmur heard. No gallop. Pulmonary:      Effort: Pulmonary effort is normal. No respiratory distress. Breath sounds: Normal breath sounds. No wheezing. Abdominal:      General: There is no distension. Tenderness: There is no abdominal tenderness. Musculoskeletal:         General: Tenderness present. No deformity. Normal range of motion. Neurological:      Deep Tendon Reflexes: Reflexes are normal and symmetric. Assessment & Plan:   1. Screening mammogram for breast cancer    2. Screening for colon cancer    3. Anxiety   Increase zoloft   Stop seoquel   Add clonazepam   F/u in 2/2024    No orders of the defined types were placed in this encounter. Meds This Visit:  Requested Prescriptions     Signed Prescriptions Disp Refills    sertraline 50 MG Oral Tab 135 tablet 1     Sig: Take 1.5 tablets (75 mg total) by mouth daily.     clonazePAM 0.5 MG Oral Tab 45 tablet 1     Sig: Take 1 tablet (0.5 mg total) by mouth 2 (two) times daily as needed for Anxiety.        Imaging & Referrals:  OP REFERRAL TO [facility] GI TELEPHONE COLON SCREEN  [facility] 2D+3D SCREENING BILAT (CPT=77067/65966)

## 2023-12-07 ENCOUNTER — OFFICE VISIT (OUTPATIENT)
Facility: CLINIC | Age: 63
End: 2023-12-07

## 2023-12-07 ENCOUNTER — TELEPHONE (OUTPATIENT)
Facility: CLINIC | Age: 63
End: 2023-12-07

## 2023-12-07 VITALS
HEIGHT: 61 IN | DIASTOLIC BLOOD PRESSURE: 68 MMHG | WEIGHT: 212 LBS | SYSTOLIC BLOOD PRESSURE: 112 MMHG | BODY MASS INDEX: 40.02 KG/M2 | HEART RATE: 59 BPM

## 2023-12-07 DIAGNOSIS — Z12.11 SCREEN FOR COLON CANCER: Primary | ICD-10-CM

## 2023-12-07 DIAGNOSIS — Z12.11 COLON CANCER SCREENING: Primary | ICD-10-CM

## 2023-12-07 PROCEDURE — S0285 CNSLT BEFORE SCREEN COLONOSC: HCPCS | Performed by: INTERNAL MEDICINE

## 2023-12-07 PROCEDURE — 3078F DIAST BP <80 MM HG: CPT | Performed by: INTERNAL MEDICINE

## 2023-12-07 PROCEDURE — 3008F BODY MASS INDEX DOCD: CPT | Performed by: INTERNAL MEDICINE

## 2023-12-07 PROCEDURE — 3074F SYST BP LT 130 MM HG: CPT | Performed by: INTERNAL MEDICINE

## 2023-12-07 RX ORDER — SODIUM, POTASSIUM,MAG SULFATES 17.5-3.13G
SOLUTION, RECONSTITUTED, ORAL ORAL
Qty: 1 EACH | Refills: 0 | Status: SHIPPED | OUTPATIENT
Start: 2023-12-07

## 2023-12-07 NOTE — PATIENT INSTRUCTIONS
1. Schedule colonoscopy with MAC [Diagnosis: CRC screening]. 2.  bowel prep from pharmacy (split dose suprep). If your prescription is not available and/or is cost-prohibitive, please contact the office as soon as possible to ensure you receive a bowel prep before your procedure. 3. Medication adjustments:       A. SEVEN DAYS BEFORE colonoscopy: HOLD ozempic (semaglutide)     B. Continue ALL other medications as usual    4. Read all bowel prep instructions carefully. 5. AVOID seeds, nuts, popcorn, and raw fruits and vegetables (cooked is okay) for 2-3 days before procedure. 6. If you start any NEW medication after your visit today, please notify us. Certain medications will need to be held before the procedure or the procedure cannot be performed. 7. You will need a ride home from your procedure since you are receiving sedation. Please ensure you will have an available ride home or the procedure cannot be performed.

## 2023-12-07 NOTE — TELEPHONE ENCOUNTER
Scheduled for:  Colonoscopy 90736/[id number]  Provider Name:  Dr. Teresa Mederos  Date:  4/4/2024  Location:  [facility]  Sedation:  MAC  Time:  9:45 am, arrival 8:45 am  Prep:  Suprep  Meds/Allergies Reconciled?:  Physician reviewed  Diagnosis with codes:  Screening for colon cancer Z12.11  Was patient informed to call insurance with codes (Y/N):  Yes  Referral sent?:  Referral was sent at the time of electronic surgical scheduling. [facility] or [address] [place] [address] notified?:  I sent an electronic request to Endo Scheduling and received a confirmation today. Medication Orders:  Pt is aware to NOT take iron pills, herbal meds and diet supplements & OZEMPIC for 7 days before exam. Also to NOT take any form of alcohol, recreational drugs and any forms of ED meds 24 hours before exam.   Misc Orders:  N/A     Further instructions given by staff:  [place] Prep instructions were given to pt in the office, pt verbalized understanding.

## 2023-12-14 RX ORDER — PHENTERMINE HYDROCHLORIDE 8 MG/1
1 TABLET ORAL DAILY
Qty: 90 TABLET | Refills: 0 | Status: SHIPPED | OUTPATIENT
Start: 2023-12-14 | End: 2024-03-13

## 2023-12-14 RX ORDER — LEVOTHYROXINE SODIUM 0.15 MG/1
150 TABLET ORAL
Qty: 90 TABLET | Refills: 3 | Status: SHIPPED | OUTPATIENT
Start: 2023-12-14

## 2023-12-16 ENCOUNTER — HOSPITAL ENCOUNTER (OUTPATIENT)
Dept: MAMMOGRAPHY | Age: 63
Discharge: HOME | End: 2023-12-16
Attending: FAMILY MEDICINE
Payer: COMMERCIAL

## 2023-12-16 ENCOUNTER — HOSPITAL ENCOUNTER (OUTPATIENT)
Dept: BONE DENSITY | Age: 63
Discharge: HOME | End: 2023-12-16
Attending: OBSTETRICS & GYNECOLOGY
Payer: COMMERCIAL

## 2023-12-16 DIAGNOSIS — Z78.0 MENOPAUSE: ICD-10-CM

## 2023-12-16 DIAGNOSIS — Z12.31 ENCOUNTER FOR SCREENING MAMMOGRAM FOR BREAST CANCER: ICD-10-CM

## 2023-12-16 PROCEDURE — 77067 SCR MAMMO BI INCL CAD: CPT | Performed by: FAMILY MEDICINE

## 2023-12-16 PROCEDURE — 77080 DXA BONE DENSITY AXIAL: CPT | Performed by: OBSTETRICS & GYNECOLOGY

## 2023-12-16 PROCEDURE — 77063 BREAST TOMOSYNTHESIS BI: CPT | Performed by: FAMILY MEDICINE

## 2024-01-22 DIAGNOSIS — E11.9 TYPE 2 DIABETES MELLITUS WITHOUT COMPLICATION, WITHOUT LONG-TERM CURRENT USE OF INSULIN (HCC): ICD-10-CM

## 2024-01-22 DIAGNOSIS — R49.0 HOARSENESS: ICD-10-CM

## 2024-01-22 DIAGNOSIS — F41.9 ANXIETY: ICD-10-CM

## 2024-01-22 DIAGNOSIS — J45.909 UNCOMPLICATED ASTHMA, UNSPECIFIED ASTHMA SEVERITY, UNSPECIFIED WHETHER PERSISTENT: Primary | ICD-10-CM

## 2024-01-22 NOTE — TELEPHONE ENCOUNTER
Patient asking for refills for:   Fenofibrate 160 MG Oral Tab,   Ozempic injection,    Sertraline 100 MG,   simvastatin 40 MG   levothyroxine (SYNTHROID) 150 MCG   pantoprazole 40 MG Oral Tab EC     as well as other refills that were at [facility], to be sent to [facility] in [place]. Verified pharmacy. Stated is completely out of Fenofibrate.

## 2024-01-23 RX ORDER — QUETIAPINE FUMARATE 50 MG/1
50 TABLET, EXTENDED RELEASE ORAL NIGHTLY
Qty: 90 TABLET | Refills: 1 | Status: SHIPPED | OUTPATIENT
Start: 2024-01-23

## 2024-01-23 RX ORDER — SEMAGLUTIDE 0.68 MG/ML
0.5 INJECTION, SOLUTION SUBCUTANEOUS WEEKLY
Qty: 3 ML | Refills: 1 | Status: SHIPPED | OUTPATIENT
Start: 2024-01-23 | End: 2024-02-22

## 2024-01-23 RX ORDER — PANTOPRAZOLE SODIUM 40 MG/1
40 TABLET, DELAYED RELEASE ORAL
Qty: 90 TABLET | Refills: 3 | Status: SHIPPED | OUTPATIENT
Start: 2024-01-23

## 2024-01-23 RX ORDER — FENOFIBRATE 160 MG/1
160 TABLET ORAL DAILY
Qty: 90 TABLET | Refills: 3 | Status: SHIPPED | OUTPATIENT
Start: 2024-01-23

## 2024-01-23 RX ORDER — PROPRANOLOL HYDROCHLORIDE 40 MG/1
40 TABLET ORAL 2 TIMES DAILY
Qty: 180 TABLET | Refills: 1 | Status: SHIPPED | OUTPATIENT
Start: 2024-01-23

## 2024-01-23 RX ORDER — ALBUTEROL SULFATE 90 UG/1
2 AEROSOL, METERED RESPIRATORY (INHALATION) EVERY 4 HOURS PRN
Qty: 6.7 EACH | Refills: 1 | Status: SHIPPED | OUTPATIENT
Start: 2024-01-23

## 2024-01-23 RX ORDER — CLONAZEPAM 0.5 MG/1
0.5 TABLET ORAL 2 TIMES DAILY PRN
Qty: 45 TABLET | Refills: 1 | Status: SHIPPED | OUTPATIENT
Start: 2024-01-23

## 2024-01-23 RX ORDER — SIMVASTATIN 40 MG
40 TABLET ORAL NIGHTLY
Qty: 90 TABLET | Refills: 3 | Status: SHIPPED | OUTPATIENT
Start: 2024-01-23

## 2024-01-23 RX ORDER — LEVOTHYROXINE SODIUM 0.15 MG/1
150 TABLET ORAL
Qty: 90 TABLET | Refills: 3 | Status: SHIPPED | OUTPATIENT
Start: 2024-01-23

## 2024-02-08 RX ORDER — SIMVASTATIN 40 MG
40 TABLET ORAL NIGHTLY
Qty: 90 TABLET | Refills: 3 | Status: SHIPPED | OUTPATIENT
Start: 2024-02-08 | End: 2024-02-13

## 2024-02-08 NOTE — TELEPHONE ENCOUNTER
Refill pass per protocol    Requested Prescriptions   Pending Prescriptions Disp Refills    simvastatin 40 MG Oral Tab 90 tablet 3     Sig: Take 1 tablet (40 mg total) by mouth nightly.       Cholesterol Medication Protocol Passed - 2/6/2024  2:48 PM        Passed - ALT < 80     Lab Results   Component Value Date    ALT 12 11/25/2023             Passed - ALT resulted within past year        Passed - Lipid panel within past 12 months     Lab Results   Component Value Date    CHOLEST 178 11/25/2023    TRIG 110 11/25/2023    HDL 57 11/25/2023     (H) 11/25/2023    VLDL 18 11/25/2023    NONHDLC 121 11/25/2023             Passed - In person appointment or virtual visit in the past 12 mos or appointment in next 3 mos     Recent Outpatient Visits              2 months ago Colon cancer screening    [facility] Marcela Huston [facility]    Office Visit    2 months ago [facility]    Office Visit    3 months ago Annual physical exam    [facility]    Office Visit    3 months ago Type 2 diabetes mellitus without complication, without long-term current use of insulin (HCC)    [facility] Osei Anton MD    Office Visit    7 months ago Primary osteoarthritis, unspecified site    [facility] Roman Pandey MD    Office Visit          Future Appointments         Provider Department Appt Notes    In 5 days [facility] 3 months follow up    In 1 month [facility]sei Anton MD [facility] BCBS PPO  NON SX F/U    In 1 month PIERRE HUSTON [facility] Colon @ [facility]

## 2024-02-13 ENCOUNTER — OFFICE VISIT (OUTPATIENT)
Dept: FAMILY MEDICINE CLINIC | Facility: CLINIC | Age: 64
End: 2024-02-13

## 2024-02-13 VITALS
TEMPERATURE: 98 F | SYSTOLIC BLOOD PRESSURE: 107 MMHG | BODY MASS INDEX: 40.15 KG/M2 | HEART RATE: 68 BPM | WEIGHT: 212.63 LBS | DIASTOLIC BLOOD PRESSURE: 66 MMHG | HEIGHT: 61 IN | OXYGEN SATURATION: 95 %

## 2024-02-13 DIAGNOSIS — B34.9 VIRAL SYNDROME: Primary | ICD-10-CM

## 2024-02-13 DIAGNOSIS — F41.9 ANXIETY: ICD-10-CM

## 2024-02-13 LAB
COVID19 BINAX NOW ANTIGEN: DETECTED
OPERATOR ID: ABNORMAL

## 2024-02-13 PROCEDURE — 3074F SYST BP LT 130 MM HG: CPT | Performed by: FAMILY MEDICINE

## 2024-02-13 PROCEDURE — 3078F DIAST BP <80 MM HG: CPT | Performed by: FAMILY MEDICINE

## 2024-02-13 PROCEDURE — 3008F BODY MASS INDEX DOCD: CPT | Performed by: FAMILY MEDICINE

## 2024-02-13 PROCEDURE — 99214 OFFICE O/P EST MOD 30 MIN: CPT | Performed by: FAMILY MEDICINE

## 2024-02-13 RX ORDER — SIMVASTATIN 40 MG
40 TABLET ORAL NIGHTLY
Qty: 90 TABLET | Refills: 3 | Status: SHIPPED | OUTPATIENT
Start: 2024-02-13

## 2024-02-13 RX ORDER — TOPIRAMATE 25 MG/1
1 TABLET ORAL EVERY EVENING
COMMUNITY

## 2024-02-13 RX ORDER — TRIAMCINOLONE ACETONIDE 0.25 MG/G
1 CREAM TOPICAL 2 TIMES DAILY PRN
COMMUNITY

## 2024-02-13 RX ORDER — HYDROCHLOROTHIAZIDE 12.5 MG/1
1 CAPSULE, GELATIN COATED ORAL DAILY
COMMUNITY

## 2024-02-13 RX ORDER — OXYBUTYNIN CHLORIDE 5 MG/1
TABLET ORAL
COMMUNITY
Start: 2023-12-18

## 2024-02-13 RX ORDER — LORAZEPAM 0.5 MG/1
1 TABLET ORAL EVERY 6 HOURS PRN
COMMUNITY

## 2024-02-13 RX ORDER — HYDROXYZINE PAMOATE 25 MG/1
CAPSULE ORAL
COMMUNITY
Start: 2023-12-18

## 2024-02-13 RX ORDER — ALENDRONATE SODIUM 70 MG/1
TABLET ORAL
COMMUNITY
Start: 2023-12-18

## 2024-02-13 RX ORDER — SOLIFENACIN SUCCINATE 5 MG/1
1 TABLET, FILM COATED ORAL DAILY
COMMUNITY

## 2024-02-13 RX ORDER — MELOXICAM 7.5 MG/1
1 TABLET ORAL DAILY
COMMUNITY

## 2024-02-13 RX ORDER — OLOPATADINE HYDROCHLORIDE 1 MG/ML
1 SOLUTION/ DROPS OPHTHALMIC EVERY 12 HOURS
COMMUNITY

## 2024-02-13 NOTE — PROGRESS NOTES
Subjective:   Patient ID: Lena Macedo is a 63 year old female.    HPI  Patient is here for f/u anxiety - doing well without seroquel   Does not feel that needs to increase sertraline   Taking clonazepam ocassionally   Also feeling sick since few days ago   Has some body aches   Some cough and nasal congestion   No shortness of breath or fever    History/Other:   Review of Systems    Constitutional: see hpi   Respiratory: see hpi   Cardiovascular: Negative.  Negative for chest pain, palpitations and leg swelling.   Gastrointestinal: Negative.  Negative for abdominal pain.   Skin: Negative.           Psychiatric/Behavioral: see hpi   Current Outpatient Medications   Medication Sig Dispense Refill    alendronate 70 MG Oral Tab       hydrOXYzine Pamoate 25 MG Oral Cap       oxybutynin 5 MG Oral Tab       triamcinolone 0.025 % External Cream Apply 1 Application topically 2 (two) times daily as needed.      topiramate 25 MG Oral Tab Take 1 tablet (25 mg total) by mouth every evening.      Solifenacin Succinate 5 MG Oral Tab Take 1 tablet (5 mg total) by mouth daily.      olopatadine 0.1 % Ophthalmic Solution Apply 1 drop to eye Q12H.      Meloxicam 7.5 MG Oral Tab Take 1 tablet (7.5 mg total) by mouth daily.      LORazepam 0.5 MG Oral Tab Take 1 tablet (0.5 mg total) by mouth every 6 (six) hours as needed.      hydroCHLOROthiazide 12.5 MG Oral Cap Take 1 capsule (12.5 mg total) by mouth daily.      sertraline 50 MG Oral Tab Take 1.5 tablets (75 mg total) by mouth daily. 135 tablet 1    simvastatin 40 MG Oral Tab Take 1 tablet (40 mg total) by mouth nightly. 90 tablet 3    Fenofibrate 160 MG Oral Tab Take 1 tablet (160 mg total) by mouth daily. 90 tablet 3    levothyroxine (SYNTHROID) 150 MCG Oral Tab Take 1 tablet (150 mcg total) by mouth before breakfast. 90 tablet 3    pantoprazole 40 MG Oral Tab EC Take 1 tablet (40 mg total) by mouth before breakfast. 90 tablet 3    semaglutide (OZEMPIC, 0.25 OR 0.5 MG/DOSE,) 2  MG/3ML Subcutaneous Solution Pen-injector Inject 0.5 mg into the skin once a week. 3 mL 1    propranolol 40 MG Oral Tab Take 1 tablet (40 mg total) by mouth 2 (two) times daily. 180 tablet 1    clonazePAM 0.5 MG Oral Tab Take 1 tablet (0.5 mg total) by mouth 2 (two) times daily as needed for Anxiety. 45 tablet 1    albuterol 108 (90 Base) MCG/ACT Inhalation Aero Soln Inhale 2 puffs into the lungs every 4 (four) hours as needed for Wheezing or Shortness of Breath. 6.7 each 1    Phentermine HCl (LOMAIRA) 8 MG Oral Tab Take 1 tablet by mouth daily for 90 doses. 90 tablet 0    Na Sulfate-K Sulfate-Mg Sulf (SUPREP BOWEL PREP KIT) 17.5-3.13-1.6 GM/177ML Oral Solution Take as discussed in clinic 1 each 0    loratadine 10 MG Oral Tab Take 1 tablet (10 mg total) by mouth daily. 90 tablet 3    clotrimazole-betamethasone 1-0.05 % External Cream Apply a small amount to the corner of your mouth where the cracked lip is twice daily for 7-10 days. 15 g 0    fluticasone propionate 50 MCG/ACT Nasal Suspension 2 sprays by Nasal route daily. 16 mL 1    Spacer/Aero-Holding Chambers Does not apply Device Use with inhaler as needed 1 each 0    hydrocortisone (ANUSOL-HC) 2.5 % External Cream Place 1 each rectally 2 (two) times daily. 1 each 1    ERGOCALCIFEROL 1.25 MG (05375 UT) Oral Cap TOME GINNY CAPSULA POR VIA ORAL GINNY VES POR SEMANA 12 capsule 3    Nebulizer Does not apply Misc by Does not apply route.      aspirin 81 MG Oral Tab Take 1 tablet (81 mg total) by mouth daily.       Allergies:No Known Allergies    Objective:   Physical Exam  Constitutional:       Appearance: She is well-developed.   Cardiovascular:      Rate and Rhythm: Normal rate and regular rhythm.      Heart sounds: Normal heart sounds.   Pulmonary:      Effort: Pulmonary effort is normal.      Breath sounds: Normal breath sounds.   Neurological:      Mental Status: She is alert.      Deep Tendon Reflexes: Reflexes are normal and symmetric.         Assessment & Plan:    1. Viral syndrome    Has covid   Tylenol ibuprofen   Zinc   Does not appear that sick so no paxlovid for now   Any worsening to call   2. Anxiety controlled cpm    Orders Placed This Encounter   Procedures    POC COVID19 BinaxNOW Antigen       Meds This Visit:  Requested Prescriptions     Signed Prescriptions Disp Refills    sertraline 50 MG Oral Tab 135 tablet 1     Sig: Take 1.5 tablets (75 mg total) by mouth daily.    simvastatin 40 MG Oral Tab 90 tablet 3     Sig: Take 1 tablet (40 mg total) by mouth nightly.       Imaging & Referrals:  None

## 2024-02-14 ENCOUNTER — MED REC SCAN ONLY (OUTPATIENT)
Dept: FAMILY MEDICINE CLINIC | Facility: CLINIC | Age: 64
End: 2024-02-14

## 2024-02-15 ENCOUNTER — TELEPHONE (OUTPATIENT)
Dept: FAMILY MEDICINE CLINIC | Facility: CLINIC | Age: 64
End: 2024-02-15

## 2024-02-15 NOTE — TELEPHONE ENCOUNTER
Patient contacts clinic to follow up on covid.  Is caring for herself at home.  Is NOT on paxlovid.  Inquires if it is ok to take her ozempic.  RN advised patient to continue her regularly prescribed medications.  States she has taken dayquil and nyquil but believes it is interrupting her sleep.  RN advised not taking these cold preps.  Stick to ibuprofen/tylenol and zinc per Dr. Correa's note.  She states overall her symptoms are improving.  She will keep clinic apprised of her progress.  Dr. Sunny GUTIÉRREZ.

## 2024-02-16 ENCOUNTER — TELEPHONE (OUTPATIENT)
Dept: FAMILY MEDICINE CLINIC | Facility: CLINIC | Age: 64
End: 2024-02-16

## 2024-02-16 NOTE — TELEPHONE ENCOUNTER
I called and spoke with patient with help of  ID [id number]    Patient still with covid symptoms. Sounds horse over the phone and says she has been coughing but seems to be getting a little better. She was asking for medications to be sent Paxlovid. Patient started with symptoms last Saturday so I explained she is outside the 5 day window for this.     Patient will take tylenol as she has been but will add robitussin for cough. If not helping she should call us.    No fever, SOB, CP or trouble breathing. Main complaint is cough but has not been taking anything over the counter for this.    Patient also asking if note for work can be extended to return 2/20/24. Initial note says to return 2/19/24/Monday     Please advise

## 2024-02-16 NOTE — TELEPHONE ENCOUNTER
Lorraine Munguia    2/16/24 10:10 AM  Note  Patient would like a return to work note after having covid to return on 2/20/24.

## 2024-02-19 ENCOUNTER — TELEPHONE (OUTPATIENT)
Dept: FAMILY MEDICINE CLINIC | Facility: CLINIC | Age: 64
End: 2024-02-19

## 2024-02-19 ENCOUNTER — OFFICE VISIT (OUTPATIENT)
Dept: INTERNAL MEDICINE CLINIC | Facility: CLINIC | Age: 64
End: 2024-02-19

## 2024-02-19 VITALS
OXYGEN SATURATION: 97 % | TEMPERATURE: 96 F | BODY MASS INDEX: 40.22 KG/M2 | WEIGHT: 213 LBS | DIASTOLIC BLOOD PRESSURE: 76 MMHG | HEIGHT: 61 IN | SYSTOLIC BLOOD PRESSURE: 116 MMHG | HEART RATE: 73 BPM

## 2024-02-19 DIAGNOSIS — U07.1 COVID-19: ICD-10-CM

## 2024-02-19 DIAGNOSIS — R05.2 SUBACUTE COUGH: Primary | ICD-10-CM

## 2024-02-19 PROCEDURE — 3078F DIAST BP <80 MM HG: CPT | Performed by: NURSE PRACTITIONER

## 2024-02-19 PROCEDURE — 99214 OFFICE O/P EST MOD 30 MIN: CPT | Performed by: NURSE PRACTITIONER

## 2024-02-19 PROCEDURE — 3008F BODY MASS INDEX DOCD: CPT | Performed by: NURSE PRACTITIONER

## 2024-02-19 PROCEDURE — 3074F SYST BP LT 130 MM HG: CPT | Performed by: NURSE PRACTITIONER

## 2024-02-19 RX ORDER — ALBUTEROL SULFATE 90 UG/1
2 AEROSOL, METERED RESPIRATORY (INHALATION) EVERY 6 HOURS PRN
Qty: 8.5 G | Refills: 0 | Status: SHIPPED | OUTPATIENT
Start: 2024-02-19

## 2024-02-19 RX ORDER — BENZONATATE 100 MG/1
100 CAPSULE ORAL 3 TIMES DAILY PRN
Qty: 20 CAPSULE | Refills: 0 | Status: SHIPPED | OUTPATIENT
Start: 2024-02-19 | End: 2024-02-26

## 2024-02-19 NOTE — ASSESSMENT & PLAN NOTE
Dry Cough post COVID-19    Plan  Hydrate with fluids.    albuterol 108 (90 Base) MCG/ACT Inhalation Aero Soln          Inhale 2 puffs into the lungs every 6 (six) hours as needed for Wheezing (cough)., Normal, Disp-8.5 g, R-0       benzonatate 100 MG Oral Cap         Take 1 capsule (100 mg total) by mouth 3 (three) times daily as needed for cough., Normal, Disp-20 capsule, R-0

## 2024-02-19 NOTE — TELEPHONE ENCOUNTER
Patient seen in office 2/13/2024, tested positive for Covid.  [place], [place] [id number], [place]  Reports still has cough and wheeze.  Audible cough noted on telephone.  Tried Robitussin without benefit.  Has some wheezing.  She does have inhaler but does not help.  Works with elderly people.  Needs note to go back to work.  Plan:  Scheduled appointment today.  Patient advised to don face mask upon entry to Clinic.  Future Appointments   Date Time Provider Department Center   2/19/2024 10:40 AM Kathryn Sarah [facility]   3/13/2024  2:15 PM Osei Anton MD [id number]   4/3/2024 11:00 AM [facility]   4/4/2024  9:45 AM PIERRE HUSTON [facility]IP[facility]OC None

## 2024-02-19 NOTE — PROGRESS NOTES
HPI:    Patient ID: Lena Macedo is a 63 year old female.    HPI Cough/COVID-19  #[id number].  Recently she had COVID-19.  Patient seen in office 2/13/2024, tested positive for Covid.  Language [place]arina [place], [place] [id number], [place]  Reports still has cough and wheeze.  Audible cough noted  Tried Robitussin without benefit.  Has some wheezing.  She does have cough medicine but does not help.  Cough is DRY  Needs note to go back to work.      Immunization History   Administered Date(s) Administered    Covid-19 Vaccine Moderna 100 mcg/0.5 ml 03/16/2021, 04/13/2021    Covid-19 Vaccine Moderna 50 Mcg/0.25 Ml 11/07/2021    Covid-19 Vaccine Pfizer Jean-Sucrose 30 mcg/0.3 ml 07/12/2022    FLU VAC QIV SPLIT 3 YRS AND OLDER (09791) 10/07/2017    FLULAVAL 6 months & older 0.5 ml Prefilled syringe (53346) 10/08/2018    FLUZONE 6 months and older PFS 0.5 ml (10329) 09/29/2014, 10/27/2016, 10/08/2018, 09/17/2020, 09/07/2021, 08/29/2023    Flucelvax 0.5ml Vaccine 10/02/2022    Fluvirin, 3 Years & >, Im 09/20/2013    Influenza 10/28/2016, 10/03/2022    Influenza(Afluria)0.5ml QIV PFS 09/21/2019    Pfizer Covid-19 Vaccine 30mcg/0.3ml 12yrs+ (5091-5614) 09/23/2023    Pneumococcal (Prevnar 13) 05/07/2019    Pneumococcal Conjugate PCV20 10/24/2023       Past Medical History:   Diagnosis Date    Anxiety state     Arthritis     Chronic pulmonary embolism without acute cor pulmonale (HCC) 10/28/2018    Class 2 obesity due to excess calories without serious comorbidity with body mass index (BMI) of 39.0 to 39.9 in adult 5/7/2018    Depression     Disorder of thyroid     Esophageal reflux     Fibromyalgia     Hearing impairment     left ear    High cholesterol     Hyperlipidemia     Hyperthyroidism     Insulin resistance     Morbid obesity with BMI of 40.0-44.9, adult (HCC)     Pneumonia due to organism     Visual impairment     wears glasses      Past Surgical History:   Procedure Laterality Date    Needle biopsy left Left     dorian    Other  surgical history      gall stones      Social History     Socioeconomic History    Marital status:    Tobacco Use    Smoking status: Never    Smokeless tobacco: Never   Vaping Use    Vaping Use: Never used   Substance and Sexual Activity    Alcohol use: No    Drug use: No   Other Topics Concern    Caffeine Concern No          Review of Systems   Constitutional:  Negative for chills, fatigue and fever.   HENT:  Negative for ear pain, hearing loss, sinus pain, sore throat and trouble swallowing.    Eyes:  Negative for pain and visual disturbance.   Respiratory:  Positive for cough. Negative for chest tightness and shortness of breath.    Cardiovascular:  Negative for chest pain, palpitations and leg swelling.   Gastrointestinal:  Negative for abdominal pain, constipation, diarrhea, nausea and vomiting.   Endocrine: Positive for cold intolerance. Negative for heat intolerance.   Genitourinary:  Negative for dysuria and hematuria.   Musculoskeletal:  Negative for back pain and joint swelling.   Skin:  Negative for rash.   Allergic/Immunologic: Negative for environmental allergies.   Neurological:  Negative for weakness, numbness and headaches.   Hematological:  Does not bruise/bleed easily.   Psychiatric/Behavioral:  Negative for dysphoric mood and sleep disturbance. The patient is not nervous/anxious.               Current Outpatient Medications   Medication Sig Dispense Refill    albuterol 108 (90 Base) MCG/ACT Inhalation Aero Soln Inhale 2 puffs into the lungs every 6 (six) hours as needed for Wheezing (cough). 8.5 g 0    benzonatate 100 MG Oral Cap Take 1 capsule (100 mg total) by mouth 3 (three) times daily as needed for cough. 20 capsule 0    alendronate 70 MG Oral Tab       hydrOXYzine Pamoate 25 MG Oral Cap       oxybutynin 5 MG Oral Tab       triamcinolone 0.025 % External Cream Apply 1 Application topically 2 (two) times daily as needed.      topiramate 25 MG Oral Tab Take 1 tablet (25 mg total) by  mouth every evening.      Solifenacin Succinate 5 MG Oral Tab Take 1 tablet (5 mg total) by mouth daily.      olopatadine 0.1 % Ophthalmic Solution Apply 1 drop to eye Q12H.      Meloxicam 7.5 MG Oral Tab Take 1 tablet (7.5 mg total) by mouth daily.      LORazepam 0.5 MG Oral Tab Take 1 tablet (0.5 mg total) by mouth every 6 (six) hours as needed.      hydroCHLOROthiazide 12.5 MG Oral Cap Take 1 capsule (12.5 mg total) by mouth daily.      sertraline 50 MG Oral Tab Take 1.5 tablets (75 mg total) by mouth daily. 135 tablet 1    simvastatin 40 MG Oral Tab Take 1 tablet (40 mg total) by mouth nightly. 90 tablet 3    Fenofibrate 160 MG Oral Tab Take 1 tablet (160 mg total) by mouth daily. 90 tablet 3    levothyroxine (SYNTHROID) 150 MCG Oral Tab Take 1 tablet (150 mcg total) by mouth before breakfast. 90 tablet 3    pantoprazole 40 MG Oral Tab EC Take 1 tablet (40 mg total) by mouth before breakfast. 90 tablet 3    semaglutide (OZEMPIC, 0.25 OR 0.5 MG/DOSE,) 2 MG/3ML Subcutaneous Solution Pen-injector Inject 0.5 mg into the skin once a week. 3 mL 1    propranolol 40 MG Oral Tab Take 1 tablet (40 mg total) by mouth 2 (two) times daily. 180 tablet 1    clonazePAM 0.5 MG Oral Tab Take 1 tablet (0.5 mg total) by mouth 2 (two) times daily as needed for Anxiety. 45 tablet 1    albuterol 108 (90 Base) MCG/ACT Inhalation Aero Soln Inhale 2 puffs into the lungs every 4 (four) hours as needed for Wheezing or Shortness of Breath. 6.7 each 1    Phentermine HCl (LOMAIRA) 8 MG Oral Tab Take 1 tablet by mouth daily for 90 doses. 90 tablet 0    Na Sulfate-K Sulfate-Mg Sulf (SUPREP BOWEL PREP KIT) 17.5-3.13-1.6 GM/177ML Oral Solution Take as discussed in clinic 1 each 0    loratadine 10 MG Oral Tab Take 1 tablet (10 mg total) by mouth daily. 90 tablet 3    clotrimazole-betamethasone 1-0.05 % External Cream Apply a small amount to the corner of your mouth where the cracked lip is twice daily for 7-10 days. 15 g 0    fluticasone  propionate 50 MCG/ACT Nasal Suspension 2 sprays by Nasal route daily. 16 mL 1    Spacer/Aero-Holding Chambers Does not apply Device Use with inhaler as needed 1 each 0    hydrocortisone (ANUSOL-HC) 2.5 % External Cream Place 1 each rectally 2 (two) times daily. 1 each 1    ERGOCALCIFEROL 1.25 MG (85968 UT) Oral Cap TOME GINNY CAPSULA POR VIA ORAL GINNY VES POR SEMANA 12 capsule 3    Nebulizer Does not apply Misc by Does not apply route.      aspirin 81 MG Oral Tab Take 1 tablet (81 mg total) by mouth daily.       Allergies:No Known Allergies   PHYSICAL EXAM:   Physical Exam  Constitutional:       Appearance: Normal appearance. She is well-developed.   HENT:      Head: Normocephalic.   Cardiovascular:      Rate and Rhythm: Normal rate and regular rhythm.      Heart sounds: Normal heart sounds. No murmur heard.     No friction rub. No gallop.   Pulmonary:      Effort: Pulmonary effort is normal. No respiratory distress.      Breath sounds: Wheezing present. No rhonchi or rales.   Abdominal:      General: Bowel sounds are normal. There is no distension.      Palpations: Abdomen is soft. There is no mass.      Tenderness: There is no abdominal tenderness. There is no right CVA tenderness, left CVA tenderness or guarding.   Musculoskeletal:         General: No tenderness.      Cervical back: Normal range of motion and neck supple. No tenderness.      Right lower leg: No edema.      Left lower leg: No edema.   Lymphadenopathy:      Cervical: No cervical adenopathy.   Skin:     General: Skin is warm and dry.      Findings: No rash.   Neurological:      Mental Status: She is alert and oriented to person, place, and time.      Coordination: Coordination normal.      Gait: Gait normal.   Psychiatric:         Mood and Affect: Mood normal.         Behavior: Behavior normal.         Thought Content: Thought content normal.         Judgment: Judgment normal.       /76 (BP Location: Right arm, Patient Position: Sitting, Cuff  Size: large)   Pulse 73   Temp (!) 96 °F (35.6 °C) (Temporal)   Ht 5' 1\" (1.549 m)   Wt 213 lb (96.6 kg)   SpO2 97%   BMI 40.25 kg/m²   Wt Readings from Last 2 Encounters:   02/19/24 213 lb (96.6 kg)   02/13/24 212 lb 9.6 oz (96.4 kg)     Body mass index is 40.25 kg/m².(2)  Lab Results   Component Value Date    WBC 10.2 11/25/2023    RBC 4.24 11/25/2023    HGB 12.1 11/25/2023    HCT 37.8 11/25/2023    MCV 89.2 11/25/2023    MCH 28.5 11/25/2023    MCHC 32.0 11/25/2023    RDW 12.5 11/25/2023    .0 11/25/2023    MPV 8.7 10/29/2018      Lab Results   Component Value Date    GLU 87 11/25/2023    BUN 19 11/25/2023    BUNCREA 24.1 (H) 11/25/2023    CREATSERUM 0.79 11/25/2023    ANIONGAP 7 11/25/2023    GFRNAA 57 (L) 07/05/2022    GFRAA 65 07/05/2022    CA 9.2 11/25/2023    OSMOCALC 290 11/25/2023    ALKPHO 47 (L) 11/25/2023    AST 22 11/25/2023    ALT 12 11/25/2023    BILT 0.4 11/25/2023    TP 7.3 11/25/2023    ALB 4.4 11/25/2023    GLOBULIN 2.9 11/25/2023     11/25/2023    K 4.2 11/25/2023     11/25/2023    CO2 28.0 11/25/2023      Lab Results   Component Value Date     (H) 07/18/2022    A1C 6.1 (A) 10/24/2023      Lab Results   Component Value Date    CHOLEST 178 11/25/2023    TRIG 110 11/25/2023    HDL 57 11/25/2023     (H) 11/25/2023    VLDL 18 11/25/2023    NONHDLC 121 11/25/2023      Lab Results   Component Value Date    T4F 1.1 09/17/2020    TSH 14.696 (H) 11/25/2023                ASSESSMENT/PLAN:     Problem List Items Addressed This Visit       COVID-19     COVID-19 Positive    Plan  See cough    Note to return to work on THursday         Subacute cough - Primary     Dry Cough post COVID-19    Plan  Hydrate with fluids.    albuterol 108 (90 Base) MCG/ACT Inhalation Aero Soln          Inhale 2 puffs into the lungs every 6 (six) hours as needed for Wheezing (cough)., Normal, Disp-8.5 g, R-0       benzonatate 100 MG Oral Cap         Take 1 capsule (100 mg total) by mouth 3  (three) times daily as needed for cough., Normal, Disp-20 capsule, R-0            Relevant Medications    albuterol 108 (90 Base) MCG/ACT Inhalation Aero Soln    benzonatate 100 MG Oral Cap          No orders of the defined types were placed in this encounter.      Meds This Visit:  Requested Prescriptions     Signed Prescriptions Disp Refills    albuterol 108 (90 Base) MCG/ACT Inhalation Aero Soln 8.5 g 0     Sig: Inhale 2 puffs into the lungs every 6 (six) hours as needed for Wheezing (cough).    benzonatate 100 MG Oral Cap 20 capsule 0     Sig: Take 1 capsule (100 mg total) by mouth 3 (three) times daily as needed for cough.       Imaging & Referrals:  None         MARY ANN Garibay

## 2024-02-24 NOTE — TELEPHONE ENCOUNTER
Please review.Protocol failed/ No protocol      Requested Prescriptions   Pending Prescriptions Disp Refills    QUETIAPINE ER 50 MG Oral Tablet 24 Hr [Pharmacy Med Name: QUETIAPINE ER 50 MG TABLET] 90 tablet 1     Sig: TOME GINNY TABLETA AL [facility]       There is no refill protocol information for this order          Future Appointments         Provider Department Appt Notes    In 2 weeks Osei Anton MD [facility] BCBS PPO  NON SX F/U    In 1 month Margaret Correa MD [facility] px, last px: 06/13/20, policy informed    In 1 month PIERRE HUSTON [facility] Colon @ [facility]             Recent Outpatient Visits              4 days ago Subacute cough    [facility] Kathryn Sarah APRN    Office Visit    [facility] week [facility]    [facility] Visit    2 months ago Colon cancer screening    [facility] Marcela Huston MD    Office Visit    2 months ago Screening mammogram for breast cancer    [facility] Margaret Dominguez MD    Office Visit    4 months ago [facility]    Office Visit

## 2024-02-26 RX ORDER — QUETIAPINE FUMARATE 50 MG/1
50 TABLET, EXTENDED RELEASE ORAL NIGHTLY
Qty: 90 TABLET | Refills: 1 | Status: SHIPPED | OUTPATIENT
Start: 2024-02-26

## 2024-03-01 NOTE — PAT NURSING NOTE
Spoke with patient states she tested positive to COVID on 2/13/24. Had chills, fatigue, and productive cough.  Currently states she no longer has the productive cough but on occasion she will have a dry cough triggered by tickle in her throat. She states she has albuterol inhaler that she uses as needed.

## 2024-03-04 NOTE — DISCHARGE INSTRUCTIONS
Discharge instruction    Nothing in the vagina, wear aparna pads  May have scant bleeding, call md if any heavy bleeding, fever, foul smelling discharge or increased pain  Script for ibuprofen for pain.  May take tylenol as well  Follow up with scheduled appointment  May shower in 24 hrs.          CIRUGIA AMBULATORIA: INSTRUCCIONES DESPUES DE BRYN RECIBIDO ANESTESIA  Debido a la Anestesia y a las medicamentos que se le aplicaron neil la cirugia, rocío reflejos y capacidaddiscernimiento pueden verse afectados. Tambien podria tener un poco de mareo.Aparte de siguir las precauciones de sentico comun, le recomendamos lo siguiente:     El paciente debe estar acompañado por alguien hasta la mañana siguiente.     No maneje ningun vehiculo automotor ni monte bicicleta.     No tome ninguna decision importante jorge luis por ejemplo firmar de documentos importantes.     No opere herramientas electricas ni electrodomesticos, tales jorge luis cuchillos electricos, batidoras electricas o serruchos electricos. No kelsey el cesped con cortadoras electricas. No practique deportes.     No vicky ejercicio.     Para evitar las nauseas, coma menos de lo normal mas o menos la mitad de lo habitual y/o lisha solo liquidos hasta la manana siguiente. Consulte con esquivel doctor si esta llevando sterling dieta especial.     No tome bebidas alcoholicas, tranquilizantes, pastilles para dormir etc., y verifique con esquivel doctor acerca de cualquier medicamento que maria t tomando actualmente.     El efecto de la medicación usada en esquivel anestesia habra pasado delaney por completo a la medianoche. Por lo tanto, puede reanudar rocío habitos cotidianos en la mañana.     Los adultos deben descansar lo ivonne posible por las siguientes 24 horas. Los niños deben permanecer en cama lo ivonne posible por las siguientes 24 horas.     Si se presenta cualquier problema, puede llamar a esquivel propio doctor personal o aceda al centro de [facility].    Si sigue estas  instrucciones, se sentira major y estara mas seguro despues de esquivel cirugia ambulatoria. Sitiene cualquier pregunta, llame al hospital y pida que lo comuniquen con la enfermera de cirugia [phone], extension [phone].    Instrucciones para el best: después de esquivel cirugía   Acaba de someterse a sterling cirugía. Neil la cirugía, le administraron un tipo de medicamento llamado anestesia para que esté relajado y no sienta dolor. Después de la cirugía, alina vez sienta algo de dolor o náuseas. [place] es común. Estos son algunos consejos para sentirse mejor y recuperarse wen después de la cirugía.   El regreso a casa  Esquivel proveedor de atención médica le enseñará cómo cuidarse cuando regrese a esquivel casa. También responderá rocío preguntas. Pida a un familiar o amigo adulto que lo conduzca a esquivel casa. Neil las primeras 24 horas después de la cirugía, siga estas recomendaciones:   No conduzca ni use maquinaria pesada.  No tome decisiones importantes ni firme ningún documento legal.  Adminístrese los medicamentos según las indicaciones.  Evite el consumo de alcohol.  Si es necesario, coordine para que alguien se quede con usted. Esta persona puede vigilar cualquier problema que se presente y lo ayudará a permanecer seguro.  Asegúrese de asistir a todas rocío visitas de control con esquivel proveedor de atención médica. Y descanse después de la cirugía neil el tiempo que le indique esquivel proveedor.   Cómo sobrellevar el dolor  Si siente dolor después de la cirugía, los analgésicos lo ayudarán a sentirse mejor. [employer] los analgésicos según las indicaciones, antes de que el dolor se intensifique. Además, pregunte a esquivel proveedor de atención médica o al farmacéutico acerca de otras formas de controlar el dolor. Estas podrían incluir aplicar calor o hielo, o hacer ejercicios de relajación. Y siga todas las instrucciones que le dé esquivel cirujano o enfermero.      Cumpla el cronograma de rocío medicamentos.     Consejos para radha  analgésicos  Para aliviar el dolor lo adriana posible, recuerde estos puntos:   Los analgésicos pueden causar malestar estomacal. Tomarlos con un poco de comida puede aliviar hceri efecto.  La mayoría de los calmantes que se toney por la boca necesitan por lo menos de 20 a 30 minutos para surtir efecto.  No espere hasta que esquivel dolor se vuelva intenso para ladonna el analgésico que le indicaron. Intente que el momento en que puede ladonna esquivel medicamento coincida con otra actividad. Cheri podría ser el momento antes de vestirse, fredo un paseo o sentarse a la syed para cenar.  El estreñimiento es un efecto secundario frecuente de algunos analgésicos. Consulte a esquivel proveedor de atención médica antes de usar cualquier medicamento, jorge luis laxantes o ablandadores de heces, para ayudar a aliviar el estreñimiento. También consulte si es preciso evitar algún tipo de alimento. Ladonna mucha cantidad de líquido y comer alimentos jorge luis frutas y verduras con alto contenido de fibra también puede ser beneficioso. Recuerde que no debe ladonna laxantes a menos que esquivel cirujano se los indique.  Mezclar bebidas alcohólicas y analgésicos puede causar mareos y enlentecer esquivel respiración. Y hasta puede ser mortal. No lisha alcohol mientras esté tomando calmantes.  Los analgésicos pueden hacer que tenga reacciones más lentas. No conduzca ni opere maquinaria mientras esté tomando analgésicos.  Esquivel proveedor de atención médica puede indicarle que tome acetaminofén (paracetamol) para ayudar a aliviar el dolor. Pregúntele qué cantidad debe ladonna por día. El acetaminofén y otros analgésicos pueden interactuar con rocío medicamentos recetados u otros medicamentos de venta karel (OTC, por rocío siglas en inglés). Algunos medicamentos recetados contienen acetaminofén y otros ingredientes. Combinar medicamentos recetados y acetaminofén de venta karel para aliviar el dolor puede provocarle sterling sobredosis accidental. Reina atentamente la etiqueta del envase de rocío  medicamentos OTC. [place] lo ayudará a saber con exactitud la lista de ingredientes, la cantidad que debe radha y cualquier advertencia. [place] también puede ayudarlo a evitar radha demasiado acetaminofén. Si tiene preguntas o no entiende la información, pídale a esquivel farmacéutico o proveedor de atención médica que se la explique antes de radha el medicamento OTC.   Manejo de las náuseas  Algunas personas pueden sentir malestar estomacal (náuseas) después de la cirugía. [place] suele suceder debido a la anestesia, el dolor, los analgésicos, la disminución del movimiento de la comida en el estómago o el estrés de la cirugía. Estos consejos lo ayudarán a manejar las náuseas y a comer alimentos más saludables mientras se recupera. Si seguía un plan alimentario especial antes de la cirugía, pregúntele a esquivel proveedor de atención médica si debe continuarlo mientras se recupera. Consulte con esquivel proveedor cómo debería continuar esquivel alimentación. Esta puede variar según el tipo de cirugía a la que se sometió. Los siguientes consejos generales pueden serle útiles:   No se fuerce a comer. [place]ese por esquivel cuerpo para saber cuándo comer y qué cantidad.  Comience con líquidos transparentes y sopa. Estos son más fáciles de digerir.  Tan pronto jorge luis se sienta listo, intente comer alimentos semisólidos. Estos incluyen puré de damir, puré de manzana y gelatina.  Lentamente, pase a alimentos sólidos. Al principio no coma alimentos grasosos, pesados ni condimentados.  No se fuerce a hacer angelica comidas grandes al día. En cambio, coma cantidades pequeñas, colleen con mayor frecuencia.  [place] los analgésicos con sterling pequeña cantidad de alimentos sólidos, jorge luis galletas saladas o sterling tostada. [place] ayuda a prevenir las náuseas.  Cuándo llamar a esquivel proveedor de atención médica   Llame de inmediato a esquivel proveedor de atención médica si nota alguno de los siguientes síntomas:   Sigue teniendo mucho dolor, o el dolor empeora, después de radha el medicamento.  Puede que el medicamento no sea lo suficientemente [place]. O wen, puede viviane complicaciones de la cirugía.  Se siente demasiado somnoliento, mareado o adormecido. Quizás el medicamento sea demasiado [place].  Tiene efectos secundarios, jorge luis náuseas o vómitos. Esquivel proveedor de atención médica puede recomendarle radha otros medicamentos.  Tiene cambios en la piel, jorge luis sarpullido, picazón o urticaria. [place] puede significar que tiene sterling reacción alérgica. Esquivel proveedor puede recomendarle radha otros medicamentos.  La incisión tiene un aspecto diferente (por ejemplo, se abre sterilng parte).  Tiene sangrado o supuración de líquido de la herida y no le dijeron que eso era esperable.  Fiebre de 100.4 °F (38 °C) o más, o según le indique esquivel proveedor.  Cuándo llamar al 911  Llame al  911  de inmediato si tiene:   Dificultad para respirar  Alicia hinchada    Si tiene apnea del sueño obstructiva   [place] la cirugía, le administraron anestesia para que esté cómodo y no sienta dolor. Después de la cirugía, es probable que tenga más ataques de apnea causados por la anestesia y otros medicamentos que le administraron. Los ataques pueden durar más de lo habitual.    En esquivel casa, vicky lo siguiente:  Cuando duerma, siga usando esquivel dispositivo de presión positiva continua en las vías respiratorias (CPAP, por rocío siglas en inglés). A menos que esquivel proveedor de atención médica le indique lo contrario, úselo siempre que duerma, ya sea de día o de noche. El dispositivo de CPAP suele usarse para tratar la apnea obstructiva del sueño.  Consulte a esquivel proveedor antes de radha cualquier analgésico, relajante muscular o sedante. Esquivel proveedor le dará información sobre los peligros de radha estos medicamentos.  Comuníquese con esquivel proveedor si tiene el sueño demasiado alterado, incluso cuando esté tomando los medicamentos según las instrucciones.  © 7762-3122 [employer]. Todos los derechos reservados. Esta información no pretende sustituir  la atención médica profesional. Sólo esquivel médico puede diagnosticar y tratar un problema de luis armando.

## 2024-03-06 ENCOUNTER — ANESTHESIA EVENT (OUTPATIENT)
Dept: SURGERY | Facility: HOSPITAL | Age: 64
End: 2024-03-06
Payer: MEDICARE

## 2024-03-06 ENCOUNTER — HOSPITAL ENCOUNTER (OUTPATIENT)
Facility: HOSPITAL | Age: 64
Setting detail: HOSPITAL OUTPATIENT SURGERY
Discharge: HOME | End: 2024-03-06
Attending: OBSTETRICS & GYNECOLOGY | Admitting: OBSTETRICS & GYNECOLOGY
Payer: MEDICARE

## 2024-03-06 ENCOUNTER — ANESTHESIA (OUTPATIENT)
Dept: SURGERY | Facility: HOSPITAL | Age: 64
End: 2024-03-06
Payer: MEDICARE

## 2024-03-06 VITALS
OXYGEN SATURATION: 98 % | WEIGHT: 210 LBS | HEIGHT: 61 IN | HEART RATE: 60 BPM | BODY MASS INDEX: 39.65 KG/M2 | DIASTOLIC BLOOD PRESSURE: 61 MMHG | SYSTOLIC BLOOD PRESSURE: 127 MMHG | TEMPERATURE: 99 F | RESPIRATION RATE: 18 BRPM

## 2024-03-06 LAB
GLUCOSE BLDC GLUCOMTR-MCNC: 106 MG/DL (ref 70–99)
GLUCOSE BLDC GLUCOMTR-MCNC: 93 MG/DL (ref 70–99)

## 2024-03-06 PROCEDURE — 0UBMXZZ EXCISION OF VULVA, EXTERNAL APPROACH: ICD-10-PCS | Performed by: OBSTETRICS & GYNECOLOGY

## 2024-03-06 PROCEDURE — 82962 GLUCOSE BLOOD TEST: CPT

## 2024-03-06 PROCEDURE — 88305 TISSUE EXAM BY PATHOLOGIST: CPT | Performed by: OBSTETRICS & GYNECOLOGY

## 2024-03-06 RX ORDER — NALOXONE HYDROCHLORIDE 0.4 MG/ML
80 INJECTION, SOLUTION INTRAMUSCULAR; INTRAVENOUS; SUBCUTANEOUS AS NEEDED
Status: DISCONTINUED | OUTPATIENT
Start: 2024-03-06 | End: 2024-03-06

## 2024-03-06 RX ORDER — SODIUM CHLORIDE, SODIUM LACTATE, POTASSIUM CHLORIDE, CALCIUM CHLORIDE 600; 310; 30; 20 MG/100ML; MG/100ML; MG/100ML; MG/100ML
INJECTION, SOLUTION INTRAVENOUS CONTINUOUS
Status: DISCONTINUED | OUTPATIENT
Start: 2024-03-06 | End: 2024-03-06

## 2024-03-06 RX ORDER — BUPIVACAINE HYDROCHLORIDE AND EPINEPHRINE 5; 5 MG/ML; UG/ML
INJECTION, SOLUTION PERINEURAL AS NEEDED
Status: DISCONTINUED | OUTPATIENT
Start: 2024-03-06 | End: 2024-03-06 | Stop reason: HOSPADM

## 2024-03-06 RX ORDER — PROCHLORPERAZINE EDISYLATE 5 MG/ML
5 INJECTION INTRAMUSCULAR; INTRAVENOUS EVERY 8 HOURS PRN
Status: DISCONTINUED | OUTPATIENT
Start: 2024-03-06 | End: 2024-03-06

## 2024-03-06 RX ORDER — METOCLOPRAMIDE 10 MG/1
10 TABLET ORAL ONCE
Status: DISCONTINUED | OUTPATIENT
Start: 2024-03-06 | End: 2024-03-06 | Stop reason: HOSPADM

## 2024-03-06 RX ORDER — METOCLOPRAMIDE HYDROCHLORIDE 5 MG/ML
10 INJECTION INTRAMUSCULAR; INTRAVENOUS ONCE
Status: DISCONTINUED | OUTPATIENT
Start: 2024-03-06 | End: 2024-03-06 | Stop reason: HOSPADM

## 2024-03-06 RX ORDER — FAMOTIDINE 20 MG/1
20 TABLET, FILM COATED ORAL ONCE
Status: COMPLETED | OUTPATIENT
Start: 2024-03-06 | End: 2024-03-06

## 2024-03-06 RX ORDER — HYDROMORPHONE HYDROCHLORIDE 1 MG/ML
0.4 INJECTION, SOLUTION INTRAMUSCULAR; INTRAVENOUS; SUBCUTANEOUS EVERY 5 MIN PRN
Status: DISCONTINUED | OUTPATIENT
Start: 2024-03-06 | End: 2024-03-06

## 2024-03-06 RX ORDER — HYDROMORPHONE HYDROCHLORIDE 1 MG/ML
0.2 INJECTION, SOLUTION INTRAMUSCULAR; INTRAVENOUS; SUBCUTANEOUS EVERY 5 MIN PRN
Status: DISCONTINUED | OUTPATIENT
Start: 2024-03-06 | End: 2024-03-06

## 2024-03-06 RX ORDER — FAMOTIDINE 10 MG/ML
20 INJECTION, SOLUTION INTRAVENOUS ONCE
Status: COMPLETED | OUTPATIENT
Start: 2024-03-06 | End: 2024-03-06

## 2024-03-06 RX ORDER — HYDROMORPHONE HYDROCHLORIDE 1 MG/ML
0.6 INJECTION, SOLUTION INTRAMUSCULAR; INTRAVENOUS; SUBCUTANEOUS EVERY 5 MIN PRN
Status: DISCONTINUED | OUTPATIENT
Start: 2024-03-06 | End: 2024-03-06

## 2024-03-06 RX ORDER — DEXTROSE MONOHYDRATE 25 G/50ML
50 INJECTION, SOLUTION INTRAVENOUS
Status: DISCONTINUED | OUTPATIENT
Start: 2024-03-06 | End: 2024-03-06

## 2024-03-06 RX ORDER — MORPHINE SULFATE 4 MG/ML
4 INJECTION, SOLUTION INTRAMUSCULAR; INTRAVENOUS EVERY 10 MIN PRN
Status: DISCONTINUED | OUTPATIENT
Start: 2024-03-06 | End: 2024-03-06

## 2024-03-06 RX ORDER — NICOTINE POLACRILEX 4 MG
30 LOZENGE BUCCAL
Status: DISCONTINUED | OUTPATIENT
Start: 2024-03-06 | End: 2024-03-06

## 2024-03-06 RX ORDER — CEFAZOLIN SODIUM/WATER 2 G/20 ML
SYRINGE (ML) INTRAVENOUS AS NEEDED
Status: DISCONTINUED | OUTPATIENT
Start: 2024-03-06 | End: 2024-03-06 | Stop reason: SURG

## 2024-03-06 RX ORDER — METOPROLOL TARTRATE 1 MG/ML
2.5 INJECTION, SOLUTION INTRAVENOUS ONCE
Status: DISCONTINUED | OUTPATIENT
Start: 2024-03-06 | End: 2024-03-06

## 2024-03-06 RX ORDER — ONDANSETRON 2 MG/ML
4 INJECTION INTRAMUSCULAR; INTRAVENOUS EVERY 6 HOURS PRN
Status: DISCONTINUED | OUTPATIENT
Start: 2024-03-06 | End: 2024-03-06

## 2024-03-06 RX ORDER — ACETAMINOPHEN 500 MG
1000 TABLET ORAL ONCE
Status: COMPLETED | OUTPATIENT
Start: 2024-03-06 | End: 2024-03-06

## 2024-03-06 RX ORDER — LIDOCAINE HYDROCHLORIDE 10 MG/ML
INJECTION, SOLUTION EPIDURAL; INFILTRATION; INTRACAUDAL; PERINEURAL AS NEEDED
Status: DISCONTINUED | OUTPATIENT
Start: 2024-03-06 | End: 2024-03-06 | Stop reason: SURG

## 2024-03-06 RX ORDER — MORPHINE SULFATE 10 MG/ML
6 INJECTION, SOLUTION INTRAMUSCULAR; INTRAVENOUS EVERY 10 MIN PRN
Status: DISCONTINUED | OUTPATIENT
Start: 2024-03-06 | End: 2024-03-06

## 2024-03-06 RX ORDER — NICOTINE POLACRILEX 4 MG
15 LOZENGE BUCCAL
Status: DISCONTINUED | OUTPATIENT
Start: 2024-03-06 | End: 2024-03-06

## 2024-03-06 RX ORDER — MORPHINE SULFATE 4 MG/ML
2 INJECTION, SOLUTION INTRAMUSCULAR; INTRAVENOUS EVERY 10 MIN PRN
Status: DISCONTINUED | OUTPATIENT
Start: 2024-03-06 | End: 2024-03-06

## 2024-03-06 RX ADMIN — LIDOCAINE HYDROCHLORIDE 50 MG: 10 INJECTION, SOLUTION EPIDURAL; INFILTRATION; INTRACAUDAL; PERINEURAL at 10:18:00

## 2024-03-06 RX ADMIN — CEFAZOLIN SODIUM/WATER 2 G: 2 G/20 ML SYRINGE (ML) INTRAVENOUS at 10:18:00

## 2024-03-06 RX ADMIN — SODIUM CHLORIDE, SODIUM LACTATE, POTASSIUM CHLORIDE, CALCIUM CHLORIDE: 600; 310; 30; 20 INJECTION, SOLUTION INTRAVENOUS at 10:15:00

## 2024-03-06 RX ADMIN — SODIUM CHLORIDE, SODIUM LACTATE, POTASSIUM CHLORIDE, CALCIUM CHLORIDE: 600; 310; 30; 20 INJECTION, SOLUTION INTRAVENOUS at 10:41:00

## 2024-03-06 NOTE — ANESTHESIA POSTPROCEDURE EVALUATION
Patient: Lena Macedo    Procedure Summary       Date: 03/06/24 Room / Location: [facility] MAIN OR 02 / [facility] MAIN OR    Anesthesia Start: 1015 Anesthesia Stop: 1049    Procedure: Excision of left vulvar lesion (Left: Vagina ) Diagnosis: (Vulvar lesion)    Surgeons: Ivonne Mayes MD Anesthesiologist: Colton Gilman MD    Anesthesia Type: general ASA Status: 3            Anesthesia Type: general    Vitals Value Taken Time   /61 03/06/24 1122   Temp 98.6 °F (37 °C) 03/06/24 1048   Pulse 60 03/06/24 1122   Resp 18 03/06/24 1122   SpO2 98 % 03/06/24 1122       [facility] AN Post Evaluation:   Patient Evaluated in PACU  Patient Participation: waiting for patient participation  Level of Consciousness: sleepy but conscious  Pain Score: 0  Pain Management: adequate  Airway Patency:patent  Yes    Cardiovascular Status: acceptable and hemodynamically stable  Respiratory Status: acceptable, spontaneous ventilation and nonlabored ventilation  Postoperative Hydration euvolemic      Colton Gilman MD  3/6/2024 12:06 PM

## 2024-03-06 NOTE — BRIEF OP NOTE
[facility]  part of [facility]    Operative Brief Note    Lena Macedo Patient Status:  Hospital Outpatient Surgery    3/10/1960 MRN [id number]   Location [facility] POST ANESTHESIA CARE UNIT Attending Tapan Mayes MD   Hosp Day # 0 PCP Margaret Correa MD     Pre Op Diagnosis:  vulvar lesion  Post Op Diagnosis: same as pre-op  Procedure:   Surgical/Procedural Cases on this Admission       Case IDs Date Procedure Surgeon Location Status    [id number] 3/6/24 Excision of left vulvar lesion Tapan Mayes MD [facility] MAIN OR [facility]          Excision left vulvar lesion  Surgeon:  TAPAN MAYES MD  Assistant Surgeon:  Benjy Florian   Anesthesia: local and IV sedation  Complications: none  Findings:  see operative    Input/Output   EBL:  5ml  Fluids:  Crystalloid 600 ml   Counts: correct x 2   Complications: None   Condition: Stable     TAPAN MAYES MD  3/6/2024  10:54 AM

## 2024-03-06 NOTE — INTERVAL H&P NOTE
Pre-op Diagnosis: Vulvar lesion    The above referenced H&P was reviewed by TAPAN RAPHAEL MD on 3/6/2024, the patient was examined and no significant changes have occurred in the patient's condition since the H&P was performed.  I discussed with the patient and/or legal representative the potential benefits, risks and side effects of this procedure; the likelihood of the patient achieving goals; and potential problems that might occur during recuperation.  I discussed reasonable alternatives to the procedure, including risks, benefits and side effects related to the alternatives and risks related to not receiving this procedure.  We will proceed with procedure as planned.       None

## 2024-03-07 NOTE — OPERATIVE REPORT
[facility], [place]    PATIENT'S NAME: AIDEN HAAS   ATTENDING PHYSICIAN: Ivonne Mayes MD   OPERATING PHYSICIAN: Ivonne Mayes MD   PATIENT ACCOUNT#:   [id number]    LOCATION:  [address]  MEDICAL RECORD #:   [id number]       YOB: 1960  ADMISSION DATE:       2024      OPERATION DATE:  2024    OPERATIVE REPORT      PREOPERATIVE DIAGNOSIS:  Vulvar lesion.  POSTOPERATIVE DIAGNOSIS:  Vulvar lesion.  PROCEDURE:  Excision of left vulvar lesion.    ASSISTANT:  FRANCISCO Rich.    ANESTHESIA:  IV sedation and local.    INDICATIONS:  This is a 63-year-old  2, para 2, with complaints of pruritic left-sided vulvar lesion despite medical treatment.  The patient was brought in for excision of a vulvar lesion.  The patient understood and accepted the risks of surgery including bleeding, infection, damage to surrounding organs.    OPERATIVE TECHNIQUE:  After appropriate informed surgical consents were obtained, the patient was taken to the operating room.  After receiving adequate IV sedation, the patient was placed in the dorsal lithotomy position.  The external genitalia was prepped and draped in the usual sterile fashion.  Attention was placed to the left labia majora.  Local anesthesia with Marcaine was injected.  Using a scalpel, this lesion was excised and sent to Pathology.  The area was sutured with 5-0 Monocryl running subcuticular stitch.  The patient was awakened easily in the operating room and taken to the recovery room in excellent condition.  The sponge, needle, and instrument counts were correct.  There were no complications.  Estimated blood loss was 5 mL, and the patient received 600 mL of lactated Ringer.    Dictated By Ivonne Mayes MD  d: 2024 12:53:52  t: 2024 23:45:16  Job [id number]  [facility]/

## 2024-03-11 DIAGNOSIS — R49.0 HOARSENESS: ICD-10-CM

## 2024-03-11 NOTE — TELEPHONE ENCOUNTER
With language line  ID # [id number] patient states she does not take clonazepam or quetiapine. I advised her that clonazepam was discontinued 3/1/24. I took quetiapine off of her medication list.     Patient's insurance changed. She cannot use [employer] anymore. She is also asking for refills to be sent to [facility]. Medications pended. Routing for protocol.

## 2024-03-12 RX ORDER — LEVOTHYROXINE SODIUM 0.15 MG/1
150 TABLET ORAL
Qty: 90 TABLET | Refills: 3 | OUTPATIENT
Start: 2024-03-12

## 2024-03-12 RX ORDER — ERGOCALCIFEROL 1.25 MG/1
CAPSULE ORAL
Qty: 12 CAPSULE | Refills: 3 | Status: SHIPPED | OUTPATIENT
Start: 2024-03-12

## 2024-03-12 RX ORDER — PANTOPRAZOLE SODIUM 40 MG/1
40 TABLET, DELAYED RELEASE ORAL
Qty: 90 TABLET | Refills: 3 | Status: SHIPPED | OUTPATIENT
Start: 2024-03-12

## 2024-03-12 RX ORDER — SIMVASTATIN 40 MG
40 TABLET ORAL NIGHTLY
Qty: 90 TABLET | Refills: 3 | OUTPATIENT
Start: 2024-03-12

## 2024-03-12 RX ORDER — FENOFIBRATE 160 MG/1
160 TABLET ORAL DAILY
Qty: 90 TABLET | Refills: 3 | Status: SHIPPED | OUTPATIENT
Start: 2024-03-12

## 2024-03-12 NOTE — TELEPHONE ENCOUNTER
Simvastatin 40mg: sent to [facility] on 02/08/2024- was discontinued and sent to [facility]'s on 02/13/2024 but it was not cancelled at [facility]- so patient has refills on file at [facility]    Levothyroxine 150: Sent to [facility] on 12/14/2023- was discontinued and sent to [facility] on 01/23/2024 but was not cancelled at [facility]-patient has refills on file.

## 2024-03-12 NOTE — TELEPHONE ENCOUNTER
Refill passed per [facility] protocol.    Needs sent to [facility] pharmacy.   Requested Prescriptions   Pending Prescriptions Disp Refills    pantoprazole 40 MG Oral Tab EC 90 tablet 3     Sig: Take 1 tablet (40 mg total) by mouth before breakfast.       Gastrointestional Medication Protocol Passed - 3/11/2024 11:57 AM        Passed - In person appointment or virtual visit in the past 12 mos or appointment in next 3 mos     Recent Outpatient Visits              3 weeks ago [facility]acute cough    [facility]    Office Visit    4 weeks ago [facility]al syndrome    [facility]    [facility]    3 months ago Colon cancer screening    [facility] Marcela Huston MD    Office Visit    3 months ago Screening mammogram for breast cancer    [facility] Margaret Dominguez MD    Office Visit    4 months ago Annual physical exam    [facility] [place] [facility]    Office Visit          Future Appointments         Provider Department Appt Notes    Tomorrow Osei Anton MD [facility] BCBS PPO  NON SX F/U    In 3 weeks [facility]    In 2 months Margaret Correa MD [facility] px, last px: 06/13/20, policy informed                 Fenofibrate 160 MG Oral Tab 90 tablet 3     Sig: Take 1 tablet (160 mg total) by mouth daily.       Cholesterol Medication Protocol Passed - 3/11/2024 11:57 AM        Passed - ALT < 80     Lab Results   Component Value Date    ALT 12 11/25/2023             Passed - ALT resulted within past year        Passed - Lipid panel within past 12 months     Lab Results   Component Value Date    CHOLEST  178 11/25/2023    TRIG 110 11/25/2023    HDL 57 11/25/2023     (H) 11/25/2023    VLDL 18 11/25/2023    NONHDLC 121 11/25/2023             Passed - In person appointment or virtual visit in the past 12 mos or appointment in next 3 mos     Recent Outpatient Visits              3 weeks ago Subacute cough    [facility] Kathryn Sarah APRN    Office Visit    4 weeks ago [facility]    [facility]    3 months ago Colon cancer screening    [facility] Marcela Huston MD    Office Visit    3 months ago Screening mammogram for breast cancer    [facility]    Office Visit    4 months ago Annual physical exam    [facility]    Office Visit          Future Appointments         Provider Department Appt Notes    Tomorrow Osei Anton MD [facility] BCBS PPO  NON SX F/U    In 3 weeks PIERRE HUSTON [facility] Colon @ [facility]    In 2 months Margaret Correa MD [facility] px, last px: 06/13/20, policy informed                 ergocalciferol 1.25 MG (66596 UT) Oral Cap 12 capsule 3     Sig: TOME GINNY CAPSULA POR VIA ORAL GINNY VES POR SEMANA       There is no refill protocol information for this order      Refused Prescriptions Disp Refills    levothyroxine (SYNTHROID) 150 MCG Oral Tab 90 tablet 3     Sig: Take 1 tablet (150 mcg total) by mouth before breakfast.       Thyroid Medication Protocol Failed - 3/11/2024 11:57 AM        Failed - Last TSH value is normal     Lab Results   Component Value Date    TSH 14.696 (H) 11/25/2023                 Passed - TSH in past 12 months        Passed - In  person appointment or virtual visit in the past 12 mos or appointment in next 3 mos     Recent Outpatient Visits              3 weeks ago [facility]acute cough    [facility] Sarah APRN    Office Visit    4 weeks ago [facility]    [facility]    3 months ago [facility] cancer screening    [facility]    Office Visit    3 months ago Screening mammogram for breast cancer    [facility]    [facility] Visit    4 months ago Annual physical exam    [facility] Margaret Dominguez MD    Office Visit          Future Appointments         Provider Department Appt Notes    Tomorrow [facility] BC[facility] PPO  NON SX F/U    In 3 weeks [facility]    In 2 months [facility] px, last px: 06/13/20, policy informed                 simvastatin 40 MG Oral Tab 90 tablet 3     Sig: Take 1 tablet (40 mg total) by mouth nightly.       Cholesterol Medication Protocol Passed - 3/11/2024 11:57 AM        Passed - ALT < 80     Lab Results   Component Value Date    ALT 12 11/25/2023             Passed - ALT resulted within past year        Passed - Lipid panel within past 12 months     Lab Results   Component Value Date    CHOLEST 178 11/25/2023    TRIG 110 11/25/2023    HDL 57 11/25/2023     (H) 11/25/2023    VLDL 18 11/25/2023    NONHDLC 121 11/25/2023             Passed - In person appointment or virtual visit in the past 12 mos or appointment in next 3 mos     Recent Outpatient Visits              3 weeks ago Subacute cough    [facility] [place]    Office Visit    4 weeks ago Viral syndrome    [facility] [place] [place] [facility]    Office Visit    3 months ago Colon cancer screening    [facility] [place], Marcela Shelby MD    Office Visit    3 months ago Screening mammogram for breast cancer    [facility] [place] [facility]    Office Visit    4 months ago Annual physical exam    [facility] [place] [facility]    Office Visit          Future Appointments         Provider Department Appt Notes    Tomorrow Osei Anton MD [facility] [place] BCBS PPO  NON SX F/U    In 3 weeks RZEPCZYNSKI, PROCEDURE [facility] [place] Colon @ [facility]    In 2 months Margaret Correa MD [facility] [place] px, last px: 06/13/20, policy informed                  Future Appointments         Provider Department Appt Notes    Tomorrow Osei Anton MD [facility] [place] BCBS PPO  NON SX F/U    In 3 weeks RZEPCZYNSKI, PROCEDURE [facility] [place] [facility] @ [facility]    In 2 months Margaret Correa MD [facility] [place] px, last px: 06/13/20, policy informed          Recent Outpatient Visits              3 weeks ago Subacute cough    [facility] [place] [facility] [facility]    Office Visit    4 weeks ago Viral syndrome    [facility] [place] [facility] [facility]    Office Visit    3 months ago Colon cancer screening    [facility] [place], Marcela Shelby MD    Office Visit    3 months ago Screening mammogram for breast cancer    [facility], Mendez Ross Tanja, MD    Office Visit    4 months ago Annual physical exam    [facility], Mendez Ross Tanja, MD    Office Visit

## 2024-03-12 NOTE — TELEPHONE ENCOUNTER
Please review; protocol failed/No Protocol    Last Vitamin D lab-04/16/2018    LOV: 02/19/2024    Requested Prescriptions   Pending Prescriptions Disp Refills    ergocalciferol 1.25 MG (13802 UT) Oral Cap 12 capsule 3     Sig: TOME GINNY CAPSULA POR VIA ORAL GINNY VES POR SEMANA       There is no refill protocol information for this order      Signed Prescriptions Disp Refills    pantoprazole 40 MG Oral Tab EC 90 tablet 3     Sig: Take 1 tablet (40 mg total) by mouth before breakfast.       Gastrointestional Medication Protocol Passed - 3/11/2024 11:57 AM        Passed - In person appointment or virtual visit in the past 12 mos or appointment in next 3 mos     Recent Outpatient Visits              3 weeks ago [facility]acute cough    [facility] [place] [facility] [facility]    Office Visit    4 weeks ago Viral syndrome    [facility]    Office Visit    3 months ago Colon cancer screening    [facility] Marcela Huston MD    Office Visit    3 months ago Screening mammogram for breast cancer    [facility]    Office Visit    4 months ago Annual physical exam    [facility]    Office Visit          Future Appointments         Provider Department Appt Notes    Tomorrow Osei Anton MD [facility] BCBS PPO  NON SX F/U    In 3 weeks [facility]    In 2 months Margaret Correa MD [facility] px, last px: 06/13/20, policy informed                 Fenofibrate 160 MG Oral Tab 90 tablet 3     Sig: Take 1 tablet (160 mg total) by mouth daily.       Cholesterol Medication Protocol Passed - 3/11/2024 11:57  AM        Passed - ALT < 80     Lab Results   Component Value Date    ALT 12 11/25/2023             Passed - ALT resulted within past year        Passed - Lipid panel within past 12 months     Lab Results   Component Value Date    CHOLEST 178 11/25/2023    TRIG 110 11/25/2023    HDL 57 11/25/2023     (H) 11/25/2023    VLDL 18 11/25/2023    NONHDLC 121 11/25/2023             Passed - In person appointment or virtual visit in the past 12 mos or appointment in next 3 mos     Recent Outpatient Visits              [place] weeks ago [place] [facility] [place]    [place]    4 weeks ago Viral syndrome    [facility] [place]    Office Visit    3 months ago [facility] cancer screening    [facility]    Office Visit    3 months ago Screening mammogram for breast cancer    [facility] Margaret Dominguez MD    Office Visit    4 months ago Annual physical exam    [facility] Margaret Dominguez MD    Office Visit          Future Appointments         Provider Department Appt Notes    Tomorrow Osei Anton MD [facility] BCBS PPO  NON SX F/U    In 3 weeks [facility]    In 2 months Margaret Correa MD [facility] px, last px: 06/13/20, policy informed                Refused Prescriptions Disp Refills    levothyroxine (SYNTHROID) 150 MCG Oral Tab 90 tablet 3     Sig: Take 1 tablet (150 mcg total) by mouth before breakfast.       Thyroid Medication Protocol Failed - 3/11/2024 11:57 AM        Failed - Last TSH value is normal     Lab Results   Component Value Date    TSH 14.696 (H) 11/25/2023                  Passed - TSH in past 12 months        Passed - In person appointment or virtual visit in the past 12 mos or appointment in next 3 mos     Recent Outpatient Visits              3 weeks ago [facility]acute cough    [facility]    Office Visit    4 weeks ago Viral syndrome    [facility]    Office Visit    3 months ago Colon cancer screening    [facility] Marcela Huston MD    Office Visit    3 months ago Screening mammogram for breast cancer    [facility] Margaret Dominguez MD    Office Visit    4 months ago Annual physical exam    [facility] Margaret Dominguez MD    Office Visit          Future Appointments         Provider Department Appt Notes    Tomorrow Osei Anton MD [facility] BCBS PPO  NON SX F/U    In 3 weeks [facility]    In 2 months Margaret Correa MD [facility] px, last px: 06/13/20, policy informed                 simvastatin 40 MG Oral Tab 90 tablet 3     Sig: Take 1 tablet (40 mg total) by mouth nightly.       Cholesterol Medication Protocol Passed - 3/11/2024 11:57 AM        Passed - ALT < 80     Lab Results   Component Value Date    ALT 12 11/25/2023             Passed - ALT resulted within past year        Passed - Lipid panel within past 12 months     Lab Results   Component Value Date    CHOLEST 178 11/25/2023    TRIG 110 11/25/2023    HDL 57 11/25/2023     (H) 11/25/2023    VLDL 18 11/25/2023    NONHDLC 121 11/25/2023             Passed - In person appointment or virtual visit in the past 12 mos or appointment in next 3 mos     Recent Outpatient Visits               3 weeks ago Subacute cough    [facility] [place]    Office Visit    4 weeks ago Viral syndrome    [facility] [place]    Office Visit    3 months ago Colon cancer screening    [facility] [place] [facility]    Office Visit    3 months ago Screening mammogram for breast cancer    [facility] [place]    Office Visit    4 months ago Annual physical exam    [facility] [place]    Office Visit          Future Appointments         Provider Department Appt Notes    Tomorrow Osei Anton MD [facility] [place] BCBS PPO  NON SX F/U    In 3 weeks RZEPCZYNSKI, PROCEDURE [facility] [place] [facility]    In 2 months Margaret Correa MD [facility] [place] px, last px: 06/13/20, policy informed                  Future Appointments         Provider Department Appt Notes    Tomorrow Osei Anton MD [facility] [place] BCBS PPO  NON SX F/U    In 3 weeks RZEPCZYNSKI, PROCEDURE [facility] [place] [facility]    In 2 months Margaret Correa MD [facility] px, last px: 06/13/20, policy informed          Recent Outpatient Visits              3 weeks ago Subacute cough    [facility] [place] [place]    Office Visit    4 weeks ago Viral syndrome    [facility] [place]    Office Visit    3 months ago Colon cancer screening    [facility] [place] [facility] [facility]    Office Visit    3 months ago Screening  mammogram for breast cancer    [facility]    Office Visit    4 months ago Annual physical exam    [facility]    [facility] Visit

## 2024-03-13 ENCOUNTER — OFFICE VISIT (OUTPATIENT)
Dept: SURGERY | Facility: CLINIC | Age: 64
End: 2024-03-13
Payer: COMMERCIAL

## 2024-03-13 VITALS
DIASTOLIC BLOOD PRESSURE: 74 MMHG | OXYGEN SATURATION: 95 % | HEIGHT: 62 IN | HEART RATE: 63 BPM | SYSTOLIC BLOOD PRESSURE: 124 MMHG | WEIGHT: 215.81 LBS | BODY MASS INDEX: 39.71 KG/M2

## 2024-03-13 DIAGNOSIS — G47.33 OSA ON CPAP: ICD-10-CM

## 2024-03-13 DIAGNOSIS — E11.9 TYPE 2 DIABETES MELLITUS WITHOUT COMPLICATION, WITHOUT LONG-TERM CURRENT USE OF INSULIN (HCC): Primary | ICD-10-CM

## 2024-03-13 DIAGNOSIS — E66.9 OBESITY (BMI 30-39.9): ICD-10-CM

## 2024-03-13 DIAGNOSIS — E78.5 DYSLIPIDEMIA: ICD-10-CM

## 2024-03-13 PROCEDURE — 3008F BODY MASS INDEX DOCD: CPT | Performed by: INTERNAL MEDICINE

## 2024-03-13 PROCEDURE — 99214 OFFICE O/P EST MOD 30 MIN: CPT | Performed by: INTERNAL MEDICINE

## 2024-03-13 PROCEDURE — 3078F DIAST BP <80 MM HG: CPT | Performed by: INTERNAL MEDICINE

## 2024-03-13 PROCEDURE — 3074F SYST BP LT 130 MM HG: CPT | Performed by: INTERNAL MEDICINE

## 2024-03-13 RX ORDER — PHENTERMINE HYDROCHLORIDE 8 MG/1
1 TABLET ORAL DAILY
Qty: 90 TABLET | Refills: 0 | Status: SHIPPED | OUTPATIENT
Start: 2024-03-13 | End: 2024-06-11

## 2024-03-13 NOTE — PROGRESS NOTES
[facility], [facility], [place]  [address] [address]  Dept: [phone]       Patient:  Lena Macedo  :      3/10/1960  MRN:      [id number]    Chief Complaint:    Chief Complaint   Patient presents with    Follow - Up    Weight Management     Weight check        SUBJECTIVE     History of Present Illness:  Lena is being seen today for a follow-up for non surgical weight loss.     Past Medical History:   Past Medical History:   Diagnosis Date    Anxiety state     Arthritis     Chronic pulmonary embolism without acute cor pulmonale (HCC) 10/28/2018    Class 2 obesity due to excess calories without serious comorbidity with body mass index (BMI) of 39.0 to 39.9 in adult 2018    Depression     Disorder of thyroid     Esophageal reflux     Fibromyalgia     Hearing impairment     left ear    High blood pressure     High cholesterol     Hyperlipidemia     Hyperthyroidism     Insulin resistance     Morbid obesity with BMI of 40.0-44.9, adult (HCC)     Osteoarthritis     Pneumonia due to organism     Prediabetes     Pulmonary embolism (HCC)     Sleep apnea     Visual impairment     wears glasses        Comorbidities:  Back pain-Improvement?  yes, Joint pain-Improvement?  yes and Sleep apnea-Improvement?  yes    OBJECTIVE     Vitals: /74   Pulse 63   Ht 5' 2\" (1.575 m)   Wt 215 lb 12.8 oz (97.9 kg)   SpO2 95%   BMI 39.47 kg/m²     Initial weight loss: +08   Total weight loss: -14   Start weight: 230    Wt Readings from Last 3 Encounters:   24 215 lb 12.8 oz (97.9 kg)   24 210 lb (95.3 kg)   24 213 lb (96.6 kg)       Patient Medications:    Current Outpatient Medications   Medication Sig Dispense Refill    pantoprazole 40 MG Oral Tab EC Take 1 tablet (40 mg total) by mouth before breakfast. 90 tablet 3    Fenofibrate 160 MG Oral Tab Take 1 tablet (160 mg total) by mouth daily. 90 tablet 3    ergocalciferol 1.25 MG  (39442 UT) Oral Cap TOME GINNY CAPSULA POR VIA ORAL GINNY VES POR SEMANA 12 capsule 3    alendronate 70 MG Oral Tab  (Patient not taking: Reported on 3/1/2024)      oxybutynin 5 MG Oral Tab  (Patient not taking: Reported on 3/1/2024)      topiramate 25 MG Oral Tab Take 1 tablet (25 mg total) by mouth every evening. (Patient not taking: Reported on 3/1/2024)      Solifenacin Succinate 5 MG Oral Tab Take 1 tablet (5 mg total) by mouth daily. (Patient not taking: Reported on 3/1/2024)      Meloxicam 7.5 MG Oral Tab Take 1 tablet (7.5 mg total) by mouth daily.      hydroCHLOROthiazide 12.5 MG Oral Cap Take 1 capsule (12.5 mg total) by mouth daily.      sertraline 50 MG Oral Tab Take 1.5 tablets (75 mg total) by mouth daily. 135 tablet 1    simvastatin 40 MG Oral Tab Take 1 tablet (40 mg total) by mouth nightly. 90 tablet 3    levothyroxine (SYNTHROID) 150 MCG Oral Tab Take 1 tablet (150 mcg total) by mouth before breakfast. 90 tablet 3    semaglutide (OZEMPIC, 0.25 OR 0.5 MG/DOSE,) 2 MG/3ML Subcutaneous Solution Pen-injector Inject 0.5 mg into the skin once a week. (Patient taking differently: Inject 0.5 mg into the skin once a week. Do not take for 7 days prior to your procedure on 3/6/24) 3 mL 1    propranolol 40 MG Oral Tab Take 1 tablet (40 mg total) by mouth 2 (two) times daily. 180 tablet 1    albuterol 108 (90 Base) MCG/ACT Inhalation Aero Soln Inhale 2 puffs into the lungs every 4 (four) hours as needed for Wheezing or Shortness of Breath. 6.7 each 1    Phentermine HCl (LOMAIRA) 8 MG Oral Tab Take 1 tablet by mouth daily for 90 doses. (Patient not taking: Reported on 3/1/2024) 90 tablet 0    loratadine 10 MG Oral Tab Take 1 tablet (10 mg total) by mouth daily. 90 tablet 3    fluticasone propionate 50 MCG/ACT Nasal Suspension 2 sprays by Nasal route daily. (Patient not taking: Reported on 3/1/2024) 16 mL 1    Spacer/Aero-Holding Chambers Does not apply Device Use with inhaler as needed 1 each 0    Nebulizer Does not  apply Misc by Does not apply route.      aspirin 81 MG Oral Tab Take 1 tablet (81 mg total) by mouth daily.       Allergies:  Patient has no known allergies.     Social History:    Social History     Socioeconomic History    Marital status:      Spouse name: Not on file    Number of children: Not on file    Years of education: Not on file    Highest education level: Not on file   Occupational History    Not on file   Tobacco Use    Smoking status: Never    Smokeless tobacco: Never   Vaping Use    Vaping Use: Never used   Substance and Sexual Activity    Alcohol use: No    Drug use: No    Sexual activity: Not on file   Other Topics Concern     Service Not Asked    Blood Transfusions Not Asked    Caffeine Concern No    Occupational Exposure Not Asked    Hobby Hazards Not Asked    Sleep Concern Not Asked    Stress Concern Not Asked    Weight Concern Not Asked    Special Diet Not Asked    Back Care Not Asked    Exercise Not Asked    Bike Helmet Not Asked    Seat Belt Not Asked    Self-Exams Not Asked   Social History Narrative    Not on file     Social Determinants of Health     Financial Resource Strain: Not on file   Food Insecurity: Not on file   Transportation Needs: Not on file   Physical Activity: Not on file   Stress: Not on file   Social Connections: Not on file   Housing Stability: Not on file     Surgical History:    Past Surgical History:   Procedure Laterality Date    CHOLECYSTECTOMY      NEEDLE BIOPSY LEFT Left     [facility]    OTHER SURGICAL HISTORY      gall stones     Family History:    Family History   Problem Relation Age of Onset    Stroke Mother         CVA    Diabetes Mother     Hypertension Father     Neurological Disorder Father         parkinson's disesae    Arthritis Father     Lipids Father         hyperlipidemia    Cancer Neg     Heart Disease Neg         CAD    Breast Cancer Neg     Ovarian Cancer Neg        Food Journal  Reviewed and Discussed:       Patient has a Food  Journal?: yes   Patient is reading nutrition labels?  yes  Average Caloric Intake:     Average CHO Intake: 160  Is patient exercising? no  Type of exercise?     Eating Habits  Patient states the following:  Eats 2 meal(s) per day  Length of time it takes to consume a meal:  20  # of snacks per day: 1 Type of snacks:  fruit  Amount of soda consumption per day:    Amount of water (in ounces) per day:  64  Drinking between meals only:  yes  Toughest challenge:  Sweet tooth    Nutritional Goals  Limit carbohydrates to 100 gms per day, Eat 100-200 calories within 1 hour of waking  and Eat 3-4 cups of fresh fruits or vegetables daily    Behavior Modifications Reviewed and Discussed  Eat breakfast, Eat 3 meals per day, Plan meals in advance, Read nutrition labels, Drink 64 oz of water per day, Maintain a daily food journal, No drinking 30 minutes before or after meals, Utlize portion control strategies to reduce calorie intake, Identify triggers for eating and manage cues and Eat slowly and take 20 to 30 minutes to complete each meal    Exercise Goals Reviewed and Discussed    Needs to increase activity     ROS:    Constitutional: negative  Respiratory: negative  Cardiovascular: negative  Gastrointestinal: negative  Musculoskeletal:negative  Neurological: negative  Behavioral/Psych: negative  Endocrine: negative  All other systems were reviewed and are negative    Physical Exam:   General appearance: alert, appears stated age, cooperative and moderately obese  Head: Normocephalic, without obvious abnormality, atraumatic  Lungs: clear to auscultation bilaterally  Heart: S1, S2 normal, no murmur, click, rub or gallop, regular rate and rhythm  Abdomen:  soft, obese, non tender  Extremities: edema trace  Pulses: 2+ and symmetric  Skin: Skin color, texture, turgor normal. No rashes or lesions  Neurologic: Grossly normal    ASSESSMENT     OBSTRUCTIVE SLEEP APNEA: The patient states her sleep apnea has been stable since the last  clinic visit. There has not been any increase in hyper-somnolence.     Encounter Diagnosis(ses):   Encounter Diagnoses   Name Primary?    Type 2 diabetes mellitus without complication, without long-term current use of insulin (HCC) Yes    GÉNESIS on CPAP     Dyslipidemia     Obesity (BMI 30-39.9)        PLAN     Patient is not interested in bariatric surgery. Patient desires to pursue traditional weight loss at this time.      Patient was instructed to continue wearing their CPaP as recommended.     DYSLIPIDEMIA: Stable on the above prescribed meal plan and medication. Liver function stable.    Lab Results   Component Value Date/Time    CHOLEST 178 11/25/2023 09:20 AM     (H) 11/25/2023 09:20 AM    HDL 57 11/25/2023 09:20 AM    TRIG 110 11/25/2023 09:20 AM    VLDL 18 11/25/2023 09:20 AM       Goals for next month:  1. Keep a food log.  2. Drink 48-64 ounces of non-caloric beverages per day. No fruit juices or regular soda.  3. Increase activity-upper body exercises, walk 10 minutes per day.  4. Increase fruit and vegetable servings to 5-6 per day.      Topiramate causing fatigue  Will discontinue    Not tolerating Metformin (should discuss with PCP)  Increase dose to 0.5 mg    PHENTERMINE: tolerating 8 mg PRN  Cardiac work up done    Diagnoses and all orders for this visit:    Type 2 diabetes mellitus without complication, without long-term current use of insulin (HCC)    GÉNESIS on CPAP    Dyslipidemia    Obesity (BMI 30-39.9)          Osei Anton MD

## 2024-03-28 NOTE — TELEPHONE ENCOUNTER
Name: Ema Brennan  YOB: 1990  Gender: female  MRN: [id number]      CC: Shoulder Pain (R)     HPI: Ema Brennan is a 33 y.o. female who presents with right shoulder pain. Pain has been bothering her for a few years. Has a lump that does cause her trouble but does not cause her pain. She feels as though it has grown over the past couple of years. She doesn't note a lot of pain, but does notice it when she is holding her 1-year-old.  She generally holds her 1-year-old and her left arm still has to use her right for all other activities.  She denies any previous shoulder dislocations.  She states she is just concerned about this lump on the back of her right shoulder.    ROS/Meds/PSH/PMH/FH/SH: I personally reviewed the patients standard intake form.  Below are the pertinents    Tobacco:  reports that she has never smoked. She has never used smokeless tobacco.  Diabetes: none  Other: None    Physical Examination:  General: no acute distress  Lungs: breathing easily  CV: regular rhythm by pulse  Right Shoulder: No previous surgical scars noted.  No gross deformity noted.  Small mobile mass on the back of her shoulder.  No head noted.  No warmth or redness noted around this mass.  Mass is not tender to palpate.  No bruising, rashes, wounds or sores noted.  No tenderness along the bicipital groove.  No tenderness at AC joint.  Active forward flexion to 180  withoutpain. Active abduction to 180 without pain.  5/5 scaption strength. 90 Degrees of active external rotation with arm abducted 90 degrees and elbow at 90 degrees without pain.  5/5 external rotation strength. IR to T10. 5/5 internal rotation strength without pain.  negative Lin and  negative Neer signs.  negative Bradley's test.  negative [place]'s test.  negative Apprehension sign. negative Pivot shift test. Sensation intact along radial, ulnar and median nerve.   strength 5 out of 5 when compared to opposite side.  Distal radius  Spk to patient, several appointments were offered with different providers due to patient unable to come in this week.  Patient declined all of them expect for next week 05/07/2019 @11:40pm.

## 2024-03-29 DIAGNOSIS — E11.9 TYPE 2 DIABETES MELLITUS WITHOUT COMPLICATION, WITHOUT LONG-TERM CURRENT USE OF INSULIN (HCC): ICD-10-CM

## 2024-03-29 NOTE — PAT NURSING NOTE
Patient instructed to hold Ozempic and Phentermine (7) days prior to procedure per the Pre-surgical testing policy.

## 2024-04-02 ENCOUNTER — TELEPHONE (OUTPATIENT)
Facility: CLINIC | Age: 64
End: 2024-04-02

## 2024-04-02 ENCOUNTER — OFFICE VISIT (OUTPATIENT)
Dept: FAMILY MEDICINE CLINIC | Facility: CLINIC | Age: 64
End: 2024-04-02

## 2024-04-02 VITALS
HEIGHT: 62 IN | HEART RATE: 78 BPM | RESPIRATION RATE: 14 BRPM | DIASTOLIC BLOOD PRESSURE: 70 MMHG | WEIGHT: 215.19 LBS | OXYGEN SATURATION: 97 % | TEMPERATURE: 97 F | SYSTOLIC BLOOD PRESSURE: 118 MMHG | BODY MASS INDEX: 39.6 KG/M2

## 2024-04-02 DIAGNOSIS — E11.9 DIABETES MELLITUS TYPE 2 IN NONOBESE (HCC): Primary | ICD-10-CM

## 2024-04-02 DIAGNOSIS — J45.901 MODERATE ASTHMA WITH EXACERBATION, UNSPECIFIED WHETHER PERSISTENT (HCC): ICD-10-CM

## 2024-04-02 DIAGNOSIS — Z12.11 SCREENING FOR COLON CANCER: ICD-10-CM

## 2024-04-02 LAB
CARTRIDGE LOT#: ABNORMAL NUMERIC
HEMOGLOBIN A1C: 6.5 % (ref 4.3–5.6)

## 2024-04-02 PROCEDURE — 99214 OFFICE O/P EST MOD 30 MIN: CPT | Performed by: FAMILY MEDICINE

## 2024-04-02 PROCEDURE — 83036 HEMOGLOBIN GLYCOSYLATED A1C: CPT | Performed by: FAMILY MEDICINE

## 2024-04-02 PROCEDURE — 3078F DIAST BP <80 MM HG: CPT | Performed by: FAMILY MEDICINE

## 2024-04-02 PROCEDURE — 3044F HG A1C LEVEL LT 7.0%: CPT | Performed by: FAMILY MEDICINE

## 2024-04-02 PROCEDURE — 3061F NEG MICROALBUMINURIA REV: CPT | Performed by: FAMILY MEDICINE

## 2024-04-02 PROCEDURE — 3074F SYST BP LT 130 MM HG: CPT | Performed by: FAMILY MEDICINE

## 2024-04-02 PROCEDURE — 3008F BODY MASS INDEX DOCD: CPT | Performed by: FAMILY MEDICINE

## 2024-04-02 RX ORDER — AZITHROMYCIN 250 MG/1
TABLET, FILM COATED ORAL
Qty: 6 TABLET | Refills: 0 | Status: SHIPPED | OUTPATIENT
Start: 2024-04-02 | End: 2024-04-06

## 2024-04-02 RX ORDER — CHOLECALCIFEROL (VITAMIN D3) 10(400)/ML
1 DROPS ORAL 3 TIMES DAILY
Qty: 270 EACH | Refills: 11 | Status: SHIPPED | OUTPATIENT
Start: 2024-04-02

## 2024-04-02 RX ORDER — FLUTICASONE PROPIONATE AND SALMETEROL 250; 50 UG/1; UG/1
1 POWDER RESPIRATORY (INHALATION) EVERY 12 HOURS SCHEDULED
Qty: 3 EACH | Refills: 0 | Status: SHIPPED | OUTPATIENT
Start: 2024-04-02

## 2024-04-02 RX ORDER — CHOLECALCIFEROL (VITAMIN D3) 10(400)/ML
1 DROPS ORAL 3 TIMES DAILY
Qty: 270 EACH | Refills: 11 | Status: SHIPPED | OUTPATIENT
Start: 2024-04-02 | End: 2024-04-02

## 2024-04-02 NOTE — PAT NURSING NOTE
Pt called with allergy symptoms, currently experiencing a cough and nasal congestion, taking Tylenol cold in am and pm. Pt has not seen her primary doctor for symptoms.      PAT RN instructed pt to call the office and reschedule Colonoscopy 4/4 with Dr. Juan.

## 2024-04-02 NOTE — PROGRESS NOTES
Subjective:   Patient ID: Lena Macedo is a 64 year old female.    HPI  Patient not feeling well   Coughing for more then a week   Feels fatigued has to use albuterol 4 times a day   Also here for f/u diabetes mellitus HbA1C today is   Otherwise doing well   History/Other:   Review of Systems    Constitutional: Negative.  Negative for activity change, appetite change, diaphoresis and fatigue.     Respiratory: see hpi     Cardiovascular: Negative.  Negative for chest pain, palpitations and leg swelling.   Gastrointestinal: Negative.  Negative for abdominal pain.   Skin: Negative.           Psychiatric/Behavioral: Negative.        Current Outpatient Medications   Medication Sig Dispense Refill    fluticasone-salmeterol 250-50 MCG/ACT Inhalation Aerosol Powder, Breath Activated Inhale 1 puff into the lungs every 12 (twelve) hours. For people with asthma or emphysema in most cases. 3 each 0    azithromycin (ZITHROMAX Z-KERMIT) 250 MG Oral Tab Take 2 tablets (500 mg total) by mouth daily for 1 day, THEN 1 tablet (250 mg total) daily for 4 days. 6 tablet 0    Phentermine HCl (LOMAIRA) 8 MG Oral Tab Take 1 tablet by mouth daily for 90 doses. 90 tablet 0    pantoprazole 40 MG Oral Tab EC Take 1 tablet (40 mg total) by mouth before breakfast. 90 tablet 3    Fenofibrate 160 MG Oral Tab Take 1 tablet (160 mg total) by mouth daily. 90 tablet 3    ergocalciferol 1.25 MG (38669 UT) Oral Cap TOME GINNY CAPSULA POR VIA ORAL GINNY VES POR SEMANA 12 capsule 3    sertraline 50 MG Oral Tab Take 1.5 tablets (75 mg total) by mouth daily. 135 tablet 1    simvastatin 40 MG Oral Tab Take 1 tablet (40 mg total) by mouth nightly. 90 tablet 3    levothyroxine (SYNTHROID) 150 MCG Oral Tab Take 1 tablet (150 mcg total) by mouth before breakfast. 90 tablet 3    propranolol 40 MG Oral Tab Take 1 tablet (40 mg total) by mouth 2 (two) times daily. 180 tablet 1    albuterol 108 (90 Base) MCG/ACT Inhalation Aero Soln Inhale 2 puffs into the lungs every 4  (four) hours as needed for Wheezing or Shortness of Breath. 6.7 each 1    loratadine 10 MG Oral Tab Take 1 tablet (10 mg total) by mouth daily. 90 tablet 3    Spacer/Aero-Holding Chambers Does not apply Device Use with inhaler as needed 1 each 0    Nebulizer Does not apply Misc by Does not apply route.      aspirin 81 MG Oral Tab Take 1 tablet (81 mg total) by mouth daily.      alendronate 70 MG Oral Tab  (Patient not taking: Reported on 3/1/2024)      oxybutynin 5 MG Oral Tab  (Patient not taking: Reported on 3/1/2024)      topiramate 25 MG Oral Tab Take 1 tablet (25 mg total) by mouth every evening. (Patient not taking: Reported on 3/1/2024)      Solifenacin Succinate 5 MG Oral Tab Take 1 tablet (5 mg total) by mouth daily. (Patient not taking: Reported on 3/1/2024)      Meloxicam 7.5 MG Oral Tab Take 1 tablet (7.5 mg total) by mouth daily. (Patient not taking: Reported on 3/29/2024)      hydroCHLOROthiazide 12.5 MG Oral Cap Take 1 capsule (12.5 mg total) by mouth daily. (Patient not taking: Reported on 3/29/2024)      semaglutide (OZEMPIC, 0.25 OR 0.5 MG/DOSE,) 2 MG/3ML Subcutaneous Solution Pen-injector Inject 0.5 mg into the skin once a week. (Patient taking differently: Inject 0.5 mg into the skin once a week. Do not take for 7 days prior to your procedure on 3/6/24) 3 mL 1    fluticasone propionate 50 MCG/ACT Nasal Suspension 2 sprays by Nasal route daily. (Patient not taking: Reported on 3/1/2024) 16 mL 1     Allergies:No Known Allergies    Objective:   Physical Exam  Constitutional:       Appearance: She is well-developed.   Cardiovascular:      Rate and Rhythm: Normal rate and regular rhythm.      Heart sounds: Normal heart sounds.   Pulmonary:      Effort: Pulmonary effort is normal.      Breath sounds: Normal breath sounds.      Comments: Prolonged exp phase  Neurological:      Mental Status: She is alert.      Deep Tendon Reflexes: Reflexes are normal and symmetric.         Assessment & Plan:   1.  Diabetes mellitus type 2 in nonobese (HCC)    2. Screening for colon cancer    3. Asthma exacerbation   Start advair   Albuterol inhaler   F/u in 1 week     Orders Placed This Encounter   Procedures    POC Glycohemoglobin [43281]    Microalb/Creat Ratio, Random Urine [E]       Meds This Visit:  Requested Prescriptions     Signed Prescriptions Disp Refills    fluticasone-salmeterol 250-50 MCG/ACT Inhalation Aerosol Powder, Breath Activated 3 each 0     Sig: Inhale 1 puff into the lungs every 12 (twelve) hours. For people with asthma or emphysema in most cases.    azithromycin (ZITHROMAX Z-KERMIT) 250 MG Oral Tab 6 tablet 0     Sig: Take 2 tablets (500 mg total) by mouth daily for 1 day, THEN 1 tablet (250 mg total) daily for 4 days.       Imaging & Referrals:  COLOGUARD COLON CANCER SCREENING (EXTERNAL)

## 2024-04-02 NOTE — TELEPHONE ENCOUNTER
Called patient - rescheduled her colonoscopy with Dr. Juan for 8/1/2024 at [facility] - also added to wait list.    Please refer to JONATHAN dated 12/7/2023 for scheduling orders.    TESTEFANY closed.

## 2024-04-02 NOTE — TELEPHONE ENCOUNTER
Patient calling to cancel and reschedule procedure due to allergies and cough. Lease call. Procedure is on 4/4/24

## 2024-04-03 LAB
CREAT UR-SCNC: 123.2 MG/DL
MICROALBUMIN UR-MCNC: 0.6 MG/DL
MICROALBUMIN/CREAT 24H UR-RTO: 4.9 UG/MG (ref ?–30)

## 2024-04-03 RX ORDER — SEMAGLUTIDE 0.68 MG/ML
0.5 INJECTION, SOLUTION SUBCUTANEOUS WEEKLY
Qty: 18 ML | Refills: 0 | Status: SHIPPED | OUTPATIENT
Start: 2024-04-03

## 2024-04-11 ENCOUNTER — LAB ENCOUNTER (OUTPATIENT)
Dept: LAB | Age: 64
End: 2024-04-11
Attending: FAMILY MEDICINE
Payer: MEDICARE

## 2024-04-11 DIAGNOSIS — E03.9 HYPOTHYROIDISM, UNSPECIFIED TYPE: ICD-10-CM

## 2024-04-11 LAB — TSI SER-ACNC: 0.48 MIU/ML (ref 0.55–4.78)

## 2024-04-11 PROCEDURE — 84443 ASSAY THYROID STIM HORMONE: CPT

## 2024-04-11 PROCEDURE — 36415 COLL VENOUS BLD VENIPUNCTURE: CPT

## 2024-04-13 DIAGNOSIS — E11.9 TYPE 2 DIABETES MELLITUS WITHOUT COMPLICATION, WITHOUT LONG-TERM CURRENT USE OF INSULIN (HCC): ICD-10-CM

## 2024-04-15 ENCOUNTER — PATIENT MESSAGE (OUTPATIENT)
Dept: OTHER | Age: 64
End: 2024-04-15

## 2024-04-15 ENCOUNTER — TELEPHONE (OUTPATIENT)
Dept: FAMILY MEDICINE CLINIC | Facility: CLINIC | Age: 64
End: 2024-04-15

## 2024-04-15 RX ORDER — LEVOTHYROXINE SODIUM 137 UG/1
137 TABLET ORAL
Qty: 90 TABLET | Refills: 2 | Status: SHIPPED | OUTPATIENT
Start: 2024-04-15

## 2024-04-15 NOTE — TELEPHONE ENCOUNTER
Called patient regarding test results; however patient had medication question for provider.   Patient states she was prescribed Fluticasone-Salmeterol 250-50 MCG/ACT Inhalation Aerosol Powder Breath Activated; however states she had a burning/itching sensation on her tongue with some swelling. Patient has not used inhaler since last week.   Patient states previously she had Albuterol Sulfate  (90 Base) MCG/ACT Inhalation Aerosol Solution. Patient asking for an alternative RX from provider.

## 2024-04-17 RX ORDER — SEMAGLUTIDE 0.68 MG/ML
0.5 INJECTION, SOLUTION SUBCUTANEOUS WEEKLY
Qty: 3 ML | Refills: 0 | OUTPATIENT
Start: 2024-04-17

## 2024-04-17 NOTE — TELEPHONE ENCOUNTER
Called [facility] pharmacy and spoke with pharmacy tech. She stated that the patient did request it there, but patient picked up at the [facility]. Pharmacy tech denied it and noted that it was picked up elsewhere.    Requested Prescriptions     Pending Prescriptions Disp Refills    OZEMPIC, 0.25 OR 0.5 MG/DOSE, 2 MG/3ML Subcutaneous Solution Pen-injector [Pharmacy Med Name: OZEMPIC 0.25 OR 0.5MG/[id number] 3ML] 3 mL 0     Sig: INJECT 0.5 MG UNDER THE SKIN ONCE A WEEK

## 2024-04-17 NOTE — TELEPHONE ENCOUNTER
Refusing Ozempic: Patient has already picked up  Called [facility] pharmacy and spoke with pharmacy technician. She stated that patient refilled and picked up her Ozempic on 04/13/2024.    Requested Prescriptions     Pending Prescriptions Disp Refills    OZEMPIC, 0.25 OR 0.5 MG/DOSE, 2 MG/3ML Subcutaneous Solution Pen-injector [Pharmacy Med Name: OZEMPIC 0.25 OR 0.5MG/[id number] 3ML] 3 mL 0     Sig: INJECT 0.5 MG UNDER THE SKIN ONCE A WEEK

## 2024-04-24 RX ORDER — BUDESONIDE AND FORMOTEROL FUMARATE DIHYDRATE 160; 4.5 UG/1; UG/1
2 AEROSOL RESPIRATORY (INHALATION) 2 TIMES DAILY
Qty: 10.2 G | Refills: 1 | Status: SHIPPED | OUTPATIENT
Start: 2024-04-24 | End: 2024-04-26

## 2024-04-24 NOTE — TELEPHONE ENCOUNTER
I called and spoke with the patient   ID [id number]    Patient states understanding and agrees to plan.   Advised if any symptoms or trouble with new inhaler sent, she should stop it and call us.

## 2024-04-24 NOTE — TELEPHONE ENCOUNTER
Message noted below.   Advise patient to stop fluticasone-salmeterol 250-50 MCG/ACT Inhalation Aerosol Powder, Breath Activated   May continue to use Albuterol as needed for asthma  Budesonide-Formoterol Fumarate 160-4.5 MCG/ACT Inhalation Aerosol, Inhale 2 puffs into the lungs 2 (two) times daily sent to pharmacy instead.

## 2024-04-26 RX ORDER — MOMETASONE FUROATE AND FORMOTEROL FUMARATE DIHYDRATE 100; 5 UG/1; UG/1
2 AEROSOL RESPIRATORY (INHALATION) 2 TIMES DAILY
Qty: 8.8 G | Refills: 0 | Status: SHIPPED | OUTPATIENT
Start: 2024-04-26

## 2024-04-26 NOTE — TELEPHONE ENCOUNTER
[facility] patient pharmacy was called and was inform that the steriod inhaler that is covered is Advair .Please advise  The Budesinide-xskiccqwbu807/4.5 is covered but pt has to pay $70 copay and pt stated that it was to expensive for her.

## 2024-04-26 NOTE — TELEPHONE ENCOUNTER
Medication Detail    Medication Quantity Refills Start End   Mometasone Furo-Formoterol Fum (DULERA) 100-5 MCG/ACT Inhalation Aerosol 8.8 g 0 4/26/2024 --   Sig:   Inhale 2 puffs into the lungs in the morning and 2 puffs before bedtime.     Route:   Inhalation     Order #:   [id number]     Script sent to pharmacy. Please inform patient

## 2024-04-26 NOTE — TELEPHONE ENCOUNTER
I left detailed message in French on [facility] verified # [phone] making aware the alternative prescription has been sent; also making aware of [employer] message being sent. [1st attempt]    RN Triage - please check if patient read [employer] message, if not please make 2nd attempt to call    ENGLISH TRANSLATION OF [employer] Fun[employer] MESSAGE SENT IS BELOW:  Good Afternoon Lena,    I left you a detailed voicemail message making you aware MARY ANN Bansal reviewed the message sent to her by the Nurse you spoke with and an alternative medication has been sent to your preferred pharmacy: Mometasone Furo-Formoterol Fum (DULERA)     If you have any questions, please call our office at [phone], ask to speak with a Nurse. You may also ask for a .      Our phone hours are:  Monday - Friday 8 AM - 4:30 PM  Saturday  Closed  Sunday   Closed    Thank You,  Lindsey RN-BSN

## 2024-04-26 NOTE — TELEPHONE ENCOUNTER
Fax recevied rx not covered by insurance           Budesonide-Formoterol Fumarate 160-4.5 MCG/ACT Inhalation Aerosol 10.2 g 1 4/24/2024 4/24/2025   Sig:   Inhale 2 puffs into the lungs 2 (two) times daily.     Route:   Inhalation     Order #:   [id number]       TREY REESE please advise

## 2024-05-06 ENCOUNTER — MED REC SCAN ONLY (OUTPATIENT)
Dept: FAMILY MEDICINE CLINIC | Facility: CLINIC | Age: 64
End: 2024-05-06

## 2024-05-08 ENCOUNTER — PATIENT MESSAGE (OUTPATIENT)
Dept: ADMINISTRATIVE | Age: 64
End: 2024-05-08

## 2024-05-08 DIAGNOSIS — Z01.818 PREOPERATIVE CLEARANCE: Primary | ICD-10-CM

## 2024-05-09 DIAGNOSIS — E66.9 OBESITY (BMI 30-39.9): ICD-10-CM

## 2024-05-09 RX ORDER — PHENTERMINE HYDROCHLORIDE 8 MG/1
1 TABLET ORAL DAILY
Qty: 90 TABLET | Refills: 0 | Status: SHIPPED | OUTPATIENT
Start: 2024-05-09

## 2024-05-14 ENCOUNTER — OFFICE VISIT (OUTPATIENT)
Dept: FAMILY MEDICINE CLINIC | Facility: CLINIC | Age: 64
End: 2024-05-14

## 2024-05-14 VITALS
RESPIRATION RATE: 16 BRPM | OXYGEN SATURATION: 92 % | SYSTOLIC BLOOD PRESSURE: 122 MMHG | HEART RATE: 74 BPM | DIASTOLIC BLOOD PRESSURE: 62 MMHG | HEIGHT: 62 IN | BODY MASS INDEX: 40.12 KG/M2 | WEIGHT: 218 LBS

## 2024-05-14 DIAGNOSIS — M81.0 OSTEOPOROSIS, UNSPECIFIED OSTEOPOROSIS TYPE, UNSPECIFIED PATHOLOGICAL FRACTURE PRESENCE: ICD-10-CM

## 2024-05-14 DIAGNOSIS — F41.9 ANXIETY: ICD-10-CM

## 2024-05-14 DIAGNOSIS — Z12.11 SCREENING FOR COLON CANCER: Primary | ICD-10-CM

## 2024-05-14 DIAGNOSIS — G47.30 SLEEP APNEA, UNSPECIFIED TYPE: ICD-10-CM

## 2024-05-14 PROCEDURE — 3074F SYST BP LT 130 MM HG: CPT | Performed by: FAMILY MEDICINE

## 2024-05-14 PROCEDURE — 3078F DIAST BP <80 MM HG: CPT | Performed by: FAMILY MEDICINE

## 2024-05-14 PROCEDURE — 99214 OFFICE O/P EST MOD 30 MIN: CPT | Performed by: FAMILY MEDICINE

## 2024-05-14 PROCEDURE — 3008F BODY MASS INDEX DOCD: CPT | Performed by: FAMILY MEDICINE

## 2024-05-14 RX ORDER — HYDROXYZINE HYDROCHLORIDE 25 MG/1
25 TABLET, FILM COATED ORAL 3 TIMES DAILY PRN
Qty: 180 TABLET | Refills: 0 | Status: SHIPPED | OUTPATIENT
Start: 2024-05-14

## 2024-05-14 RX ORDER — ALENDRONATE SODIUM 70 MG/1
70 TABLET ORAL
Qty: 12 TABLET | Refills: 3 | Status: SHIPPED | OUTPATIENT
Start: 2024-05-14

## 2024-05-14 NOTE — PROGRESS NOTES
Subjective:   Patient ID: Lena Macedo is a 64 year old female.    HPI  Here for f/u anxiety   Doing well stable on medications also needed refill on fosamax and hydroxyzine that she said is helping quite a bit  Patient has also sleep apnea and states that needs replacement of her cpap machine that this one is not working properly   History/Other:   Review of Systems    Constitutional: Negative.  Negative for activity change, appetite change, diaphoresis  Feels tired and sleepy during the day  Respiratory: Negative.  Negative for apnea, cough, chest tightness and shortness of breath.    Cardiovascular: Negative.  Negative for chest pain, palpitations and leg swelling.   Gastrointestinal: Negative.  Negative for abdominal pain.   Skin: Negative.           Psychiatric/Behavioral: see hpi   Current Outpatient Medications   Medication Sig Dispense Refill    hydrOXYzine 25 MG Oral Tab Take 1 tablet (25 mg total) by mouth 3 (three) times daily as needed for Itching. 180 tablet 0    alendronate (FOSAMAX) 70 MG Oral Tab Take 1 tablet (70 mg total) by mouth every 7 days. 12 tablet 3    LOMAIRA 8 MG Oral Tab TAKE 1 TABLET BY MOUTH DAILY 90 tablet 0    Mometasone Furo-Formoterol Fum (DULERA) 100-5 MCG/ACT Inhalation Aerosol Inhale 2 puffs into the lungs in the morning and 2 puffs before bedtime. 8.8 g 0    levothyroxine (SYNTHROID) 137 MCG Oral Tab Take 137 mcg by mouth before breakfast. 90 tablet 2    semaglutide (OZEMPIC, 0.25 OR 0.5 MG/DOSE,) 2 MG/3ML Subcutaneous Solution Pen-injector Inject 0.5 mg into the skin once a week. 18 mL 0    pantoprazole 40 MG Oral Tab EC Take 1 tablet (40 mg total) by mouth before breakfast. 90 tablet 3    Fenofibrate 160 MG Oral Tab Take 1 tablet (160 mg total) by mouth daily. 90 tablet 3    ergocalciferol 1.25 MG (05401 UT) Oral Cap TOME GINNY CAPSULA POR VIA ORAL GINNY VES POR SEMANA 12 capsule 3    sertraline 50 MG Oral Tab Take 1.5 tablets (75 mg total) by mouth daily. 135 tablet 1     simvastatin 40 MG Oral Tab Take 1 tablet (40 mg total) by mouth nightly. 90 tablet 3    propranolol 40 MG Oral Tab Take 1 tablet (40 mg total) by mouth 2 (two) times daily. 180 tablet 1    Incontinence Supply Disposable (COMFORT PROTECT ADULT DIAPER/L) Does not apply Misc 1 each in the morning, at noon, and at bedtime. 270 each 11    Spacer/Aero-Holding Chambers Does not apply Device Use with inhaler as needed 1 each 0    Nebulizer Does not apply Misc by Does not apply route.       Allergies:  Allergies   Allergen Reactions    Advair Hfa OTHER (SEE COMMENTS)     Tingling sensation in throat        Objective:   Physical Exam  Constitutional:       Appearance: She is well-developed.   Cardiovascular:      Rate and Rhythm: Normal rate and regular rhythm.      Heart sounds: Normal heart sounds.   Pulmonary:      Effort: Pulmonary effort is normal.      Breath sounds: Normal breath sounds.   Neurological:      Mental Status: She is alert.      Deep Tendon Reflexes: Reflexes are normal and symmetric.         Assessment & Plan:   1. Screening for colon cancer    2. Sleep apnea, unspecified type    Given Rx for CPAP machine  3. Anxiety cpm   4. Osteoporosis   cpm    Orders Placed This Encounter   Procedures    Occult Blood, Fecal, FIT Immunoassay       Meds This Visit:  Requested Prescriptions     Signed Prescriptions Disp Refills    hydrOXYzine 25 MG Oral Tab 180 tablet 0     Sig: Take 1 tablet (25 mg total) by mouth 3 (three) times daily as needed for Itching.    alendronate (FOSAMAX) 70 MG Oral Tab 12 tablet 3     Sig: Take 1 tablet (70 mg total) by mouth every 7 days.       Imaging & Referrals:  DME - EXTERNAL

## 2024-05-21 ENCOUNTER — MED REC SCAN ONLY (OUTPATIENT)
Dept: FAMILY MEDICINE CLINIC | Facility: CLINIC | Age: 64
End: 2024-05-21

## 2024-05-21 ENCOUNTER — TELEPHONE (OUTPATIENT)
Dept: FAMILY MEDICINE CLINIC | Facility: CLINIC | Age: 64
End: 2024-05-21

## 2024-05-23 ENCOUNTER — TELEPHONE (OUTPATIENT)
Dept: FAMILY MEDICINE CLINIC | Facility: CLINIC | Age: 64
End: 2024-05-23

## 2024-05-23 NOTE — TELEPHONE ENCOUNTER
Informed  ordered labs to be completed for upcoming surgery 06/04. Per patient, will complete Saturday 05/25

## 2024-05-25 ENCOUNTER — LAB ENCOUNTER (OUTPATIENT)
Dept: LAB | Facility: HOSPITAL | Age: 64
End: 2024-05-25
Attending: FAMILY MEDICINE

## 2024-05-25 ENCOUNTER — LAB ENCOUNTER (OUTPATIENT)
Dept: LAB | Age: 64
End: 2024-05-25
Attending: FAMILY MEDICINE

## 2024-05-25 DIAGNOSIS — Z12.11 SCREENING FOR COLON CANCER: ICD-10-CM

## 2024-05-25 DIAGNOSIS — Z01.818 PREOPERATIVE CLEARANCE: ICD-10-CM

## 2024-05-25 LAB
ANION GAP SERPL CALC-SCNC: 6 MMOL/L (ref 0–18)
ATRIAL RATE: 64 BPM
BASOPHILS # BLD AUTO: 0.05 X10(3) UL (ref 0–0.2)
BASOPHILS NFR BLD AUTO: 0.4 %
BUN BLD-MCNC: 20 MG/DL (ref 9–23)
BUN/CREAT SERPL: 26 (ref 10–20)
CALCIUM BLD-MCNC: 10.5 MG/DL (ref 8.7–10.4)
CHLORIDE SERPL-SCNC: 105 MMOL/L (ref 98–112)
CO2 SERPL-SCNC: 34 MMOL/L (ref 21–32)
CREAT BLD-MCNC: 0.77 MG/DL
DEPRECATED RDW RBC AUTO: 41 FL (ref 35.1–46.3)
EGFRCR SERPLBLD CKD-EPI 2021: 86 ML/MIN/1.73M2 (ref 60–?)
EOSINOPHIL # BLD AUTO: 0.15 X10(3) UL (ref 0–0.7)
EOSINOPHIL NFR BLD AUTO: 1.3 %
ERYTHROCYTE [DISTWIDTH] IN BLOOD BY AUTOMATED COUNT: 12.9 % (ref 11–15)
FASTING STATUS PATIENT QL REPORTED: YES
GLUCOSE BLD-MCNC: 95 MG/DL (ref 70–99)
HCT VFR BLD AUTO: 37.7 %
HGB BLD-MCNC: 12.4 G/DL
IMM GRANULOCYTES # BLD AUTO: 0.03 X10(3) UL (ref 0–1)
IMM GRANULOCYTES NFR BLD: 0.3 %
LYMPHOCYTES # BLD AUTO: 3.93 X10(3) UL (ref 1–4)
LYMPHOCYTES NFR BLD AUTO: 34.2 %
MCH RBC QN AUTO: 28.6 PG (ref 26–34)
MCHC RBC AUTO-ENTMCNC: 32.9 G/DL (ref 31–37)
MCV RBC AUTO: 86.9 FL
MONOCYTES # BLD AUTO: 0.61 X10(3) UL (ref 0.1–1)
MONOCYTES NFR BLD AUTO: 5.3 %
NEUTROPHILS # BLD AUTO: 6.73 X10 (3) UL (ref 1.5–7.7)
NEUTROPHILS # BLD AUTO: 6.73 X10(3) UL (ref 1.5–7.7)
NEUTROPHILS NFR BLD AUTO: 58.5 %
OSMOLALITY SERPL CALC.SUM OF ELEC: 302 MOSM/KG (ref 275–295)
P AXIS: 33 DEGREES
P-R INTERVAL: 172 MS
PLATELET # BLD AUTO: 336 10(3)UL (ref 150–450)
POTASSIUM SERPL-SCNC: 4.2 MMOL/L (ref 3.5–5.1)
Q-T INTERVAL: 416 MS
QRS DURATION: 94 MS
QTC CALCULATION (BEZET): 429 MS
R AXIS: 7 DEGREES
RBC # BLD AUTO: 4.34 X10(6)UL
SODIUM SERPL-SCNC: 145 MMOL/L (ref 136–145)
T AXIS: 70 DEGREES
VENTRICULAR RATE: 64 BPM
WBC # BLD AUTO: 11.5 X10(3) UL (ref 4–11)

## 2024-05-25 PROCEDURE — 80048 BASIC METABOLIC PNL TOTAL CA: CPT

## 2024-05-25 PROCEDURE — 82274 ASSAY TEST FOR BLOOD FECAL: CPT

## 2024-05-25 PROCEDURE — 85025 COMPLETE CBC W/AUTO DIFF WBC: CPT

## 2024-05-25 PROCEDURE — 93010 ELECTROCARDIOGRAM REPORT: CPT | Performed by: INTERNAL MEDICINE

## 2024-05-25 PROCEDURE — 93005 ELECTROCARDIOGRAM TRACING: CPT

## 2024-05-25 PROCEDURE — 36415 COLL VENOUS BLD VENIPUNCTURE: CPT

## 2024-05-28 LAB — HEMOCCULT STL QL: POSITIVE

## 2024-05-29 ENCOUNTER — TELEPHONE (OUTPATIENT)
Dept: FAMILY MEDICINE CLINIC | Facility: CLINIC | Age: 64
End: 2024-05-29

## 2024-05-29 NOTE — TELEPHONE ENCOUNTER
I received a call from  she stated that patient is having a  cystoscopy with bulkamid injection the  surgeon will be  Dr. Hung urologist. Patient will have the procedure done June 4, 24.   will like for me to fax her patient last Px to Fax number [phone]. It has been faxed and patient gave permission to fax it.   will also be faxing a form that  needs to sign and sent back to them.      Onsite staff please be on the look out for this form. Thank you

## 2024-05-30 ENCOUNTER — TELEPHONE (OUTPATIENT)
Dept: FAMILY MEDICINE CLINIC | Facility: CLINIC | Age: 64
End: 2024-05-30

## 2024-05-30 ENCOUNTER — TELEPHONE (OUTPATIENT)
Facility: CLINIC | Age: 64
End: 2024-05-30

## 2024-05-30 ENCOUNTER — MED REC SCAN ONLY (OUTPATIENT)
Dept: FAMILY MEDICINE CLINIC | Facility: CLINIC | Age: 64
End: 2024-05-30

## 2024-05-30 NOTE — TELEPHONE ENCOUNTER
Kathryn calling from Dr Correa's office states per  requesting if possible for sooner procedure date due to blood in stool. Please call Dr Correa's office RN.

## 2024-05-30 NOTE — TELEPHONE ENCOUNTER
With [facility]  Aubrey#[id number],stated that she is scheduled for the colonoscopy on 8/1/24. BUT, since she has blood in the stool (see labs 5/22/24 occult blood results ), she is requesting if our office could call the GI and re schedule it earlier in June 2024.       Future Appointments   Date Time Provider Department Center   6/14/2024  9:30 AM Margaret Correa MD [facility]   8/1/2024 11:15 AM ROBEL, PROCEDURE [id number] None   9/16/2024  3:00 PM Osei Anton MD [id number] [place] [facility]   10/15/2024  3:30 PM [facility] MD [facility]

## 2024-05-30 NOTE — TELEPHONE ENCOUNTER
Spoke with Grace in the GI department and states she will send a message to the procedure schedulers to see if patient can be scheduled sooner.   She will have the department follow up with RN triage department with an answer.   Holding message for follow up.

## 2024-05-31 NOTE — TELEPHONE ENCOUNTER
Schedulers:  PCP called requesting we get patient in sooner than 8/1/24 for colonoscopy due to rectal bleeding and positive FIT-please assist.    Thank you

## 2024-05-31 NOTE — TELEPHONE ENCOUNTER
Called patient - left voicemail advising I have her on our urgent wait list for any sooner openings at [facility] and will call if something opens up before her scheduled procedure on 8/1/2024.

## 2024-06-03 NOTE — TELEPHONE ENCOUNTER
Called patient back - was able to reschedule her colonoscopy to 6/17/2024 at [facility] with Dr. Juan.    Please refer to T.PATRICIA dated 12/7/2023 for scheduling orders.

## 2024-06-03 NOTE — TELEPHONE ENCOUNTER
Called patient - explained we received a call from her doctor's office and have her on the urgent wait list to move up her procedure - will call if there are any openings before 8/1/2024 at [facility].

## 2024-06-03 NOTE — TELEPHONE ENCOUNTER
Spoke with patient and advised according to message below she is on an urgent wait list for a sooner appt for colonoscopy. Message below states she was left a voicemail and she will receive a call if something opens up sooner.   Patient states [place] will be [place] of [place] from [place]12- until the end of July. She is hoping to get an appt for her colonoscopy the month of June. Advised if she needs anything further to let the office know.   Patient verbalized understanding.

## 2024-06-04 NOTE — TELEPHONE ENCOUNTER
Patient was moved up to 6/17/2024 at [facility] with Dr. Juan - please refer to T.PATRICIA dated 12/7/2023 for scheduling orders.

## 2024-06-14 ENCOUNTER — OFFICE VISIT (OUTPATIENT)
Dept: FAMILY MEDICINE CLINIC | Facility: CLINIC | Age: 64
End: 2024-06-14

## 2024-06-14 VITALS
OXYGEN SATURATION: 98 % | BODY MASS INDEX: 40.37 KG/M2 | HEIGHT: 62 IN | DIASTOLIC BLOOD PRESSURE: 64 MMHG | WEIGHT: 219.38 LBS | RESPIRATION RATE: 16 BRPM | HEART RATE: 76 BPM | SYSTOLIC BLOOD PRESSURE: 146 MMHG

## 2024-06-14 DIAGNOSIS — R94.31 ABNORMAL EKG: Primary | ICD-10-CM

## 2024-06-14 PROCEDURE — 3077F SYST BP >= 140 MM HG: CPT | Performed by: FAMILY MEDICINE

## 2024-06-14 PROCEDURE — 3078F DIAST BP <80 MM HG: CPT | Performed by: FAMILY MEDICINE

## 2024-06-14 PROCEDURE — 3008F BODY MASS INDEX DOCD: CPT | Performed by: FAMILY MEDICINE

## 2024-06-14 PROCEDURE — 99213 OFFICE O/P EST LOW 20 MIN: CPT | Performed by: FAMILY MEDICINE

## 2024-06-14 NOTE — PROGRESS NOTES
Subjective:   Patient ID: Lena Macedo is a 64 year old female.    HPI  Here for f/u abnornmal EKG   Patient has right bundle branch block   Denies any chest pain now   History/Other:   Review of Systems    Constitutional: Negative.  Negative for activity change, appetite change, diaphoresis and fatigue.     Respiratory: Negative.  Negative for apnea, cough, chest tightness and shortness of breath.    Cardiovascular: Negative.  Negative for chest pain, palpitations and leg swelling.   Gastrointestinal: Negative.  Negative for abdominal pain.     Current Outpatient Medications   Medication Sig Dispense Refill    hydrOXYzine 25 MG Oral Tab Take 1 tablet (25 mg total) by mouth 3 (three) times daily as needed for Itching. 180 tablet 0    alendronate (FOSAMAX) 70 MG Oral Tab Take 1 tablet (70 mg total) by mouth every 7 days. 12 tablet 3    LOMAIRA 8 MG Oral Tab TAKE 1 TABLET BY MOUTH DAILY 90 tablet 0    Mometasone Furo-Formoterol Fum (DULERA) 100-5 MCG/ACT Inhalation Aerosol Inhale 2 puffs into the lungs in the morning and 2 puffs before bedtime. 8.8 g 0    levothyroxine (SYNTHROID) 137 MCG Oral Tab Take 137 mcg by mouth before breakfast. 90 tablet 2    semaglutide (OZEMPIC, 0.25 OR 0.5 MG/DOSE,) 2 MG/3ML Subcutaneous Solution Pen-injector Inject 0.5 mg into the skin once a week. 18 mL 0    Incontinence Supply Disposable (COMFORT PROTECT ADULT DIAPER/L) Does not apply Misc 1 each in the morning, at noon, and at bedtime. 270 each 11    pantoprazole 40 MG Oral Tab EC Take 1 tablet (40 mg total) by mouth before breakfast. 90 tablet 3    Fenofibrate 160 MG Oral Tab Take 1 tablet (160 mg total) by mouth daily. 90 tablet 3    ergocalciferol 1.25 MG (01333 UT) Oral Cap TOME GINNY CAPSULA POR VIA ORAL GINNY VES POR SEMANA 12 capsule 3    sertraline 50 MG Oral Tab Take 1.5 tablets (75 mg total) by mouth daily. 135 tablet 1    simvastatin 40 MG Oral Tab Take 1 tablet (40 mg total) by mouth nightly. 90 tablet 3    propranolol 40 MG  Oral Tab Take 1 tablet (40 mg total) by mouth 2 (two) times daily. 180 tablet 1    Spacer/Aero-Holding Chambers Does not apply Device Use with inhaler as needed 1 each 0    Nebulizer Does not apply Misc by Does not apply route.       Allergies:  Allergies   Allergen Reactions    Advair Hfa OTHER (SEE COMMENTS)     Tingling sensation in throat        Objective:   Physical Exam  Constitutional:       Appearance: Normal appearance.   Cardiovascular:      Rate and Rhythm: Normal rate and regular rhythm.      Pulses: Normal pulses.      Heart sounds: Normal heart sounds.   Pulmonary:      Effort: Pulmonary effort is normal.   Musculoskeletal:      Cervical back: Normal range of motion.   Neurological:      Mental Status: She is alert.         Assessment & Plan:   1. Abnormal EKG    Needs to see cardiology       No orders of the defined types were placed in this encounter.      Meds This Visit:  Requested Prescriptions      No prescriptions requested or ordered in this encounter       Imaging & Referrals:  CARDIO - INTERNAL

## 2024-06-17 ENCOUNTER — HOSPITAL ENCOUNTER (OUTPATIENT)
Facility: HOSPITAL | Age: 64
Setting detail: HOSPITAL OUTPATIENT SURGERY
Discharge: HOME | End: 2024-06-17
Attending: INTERNAL MEDICINE | Admitting: INTERNAL MEDICINE

## 2024-06-17 ENCOUNTER — TELEPHONE (OUTPATIENT)
Facility: CLINIC | Age: 64
End: 2024-06-17

## 2024-06-17 ENCOUNTER — ANESTHESIA (OUTPATIENT)
Dept: ENDOSCOPY | Facility: HOSPITAL | Age: 64
End: 2024-06-17
Payer: MEDICARE

## 2024-06-17 ENCOUNTER — ANESTHESIA EVENT (OUTPATIENT)
Dept: ENDOSCOPY | Facility: HOSPITAL | Age: 64
End: 2024-06-17
Payer: MEDICARE

## 2024-06-17 DIAGNOSIS — Z12.11 SCREEN FOR COLON CANCER: ICD-10-CM

## 2024-06-17 LAB — GLUCOSE BLDC GLUCOMTR-MCNC: 91 MG/DL (ref 70–99)

## 2024-06-17 PROCEDURE — 45385 COLONOSCOPY W/LESION REMOVAL: CPT | Performed by: INTERNAL MEDICINE

## 2024-06-17 PROCEDURE — 0DBN8ZX EXCISION OF SIGMOID COLON, VIA NATURAL OR ARTIFICIAL OPENING ENDOSCOPIC, DIAGNOSTIC: ICD-10-PCS | Performed by: INTERNAL MEDICINE

## 2024-06-17 RX ORDER — SODIUM CHLORIDE, SODIUM LACTATE, POTASSIUM CHLORIDE, CALCIUM CHLORIDE 600; 310; 30; 20 MG/100ML; MG/100ML; MG/100ML; MG/100ML
INJECTION, SOLUTION INTRAVENOUS CONTINUOUS
Status: DISCONTINUED | OUTPATIENT
Start: 2024-06-17 | End: 2024-06-17

## 2024-06-17 RX ORDER — LIDOCAINE HYDROCHLORIDE 10 MG/ML
INJECTION, SOLUTION EPIDURAL; INFILTRATION; INTRACAUDAL; PERINEURAL AS NEEDED
Status: DISCONTINUED | OUTPATIENT
Start: 2024-06-17 | End: 2024-06-17 | Stop reason: SURG

## 2024-06-17 RX ORDER — ASPIRIN 81 MG/1
81 TABLET ORAL DAILY
COMMUNITY

## 2024-06-17 RX ADMIN — SODIUM CHLORIDE, SODIUM LACTATE, POTASSIUM CHLORIDE, CALCIUM CHLORIDE: 600; 310; 30; 20 INJECTION, SOLUTION INTRAVENOUS at 16:50:00

## 2024-06-17 RX ADMIN — LIDOCAINE HYDROCHLORIDE 20 MG: 10 INJECTION, SOLUTION EPIDURAL; INFILTRATION; INTRACAUDAL; PERINEURAL at 16:52:00

## 2024-06-17 NOTE — H&P
History & Physical Examination    Patient Name: Lena Macedo  MRN: [id number]  [facility]: [id number]  YOB: 1960    Diagnosis: screening for colon cancer    Medications Prior to Admission   Medication Sig Dispense Refill Last Dose    pantoprazole 40 MG Oral Tab EC Take 1 tablet (40 mg total) by mouth before breakfast. 90 tablet 3     Fenofibrate 160 MG Oral Tab Take 1 tablet (160 mg total) by mouth daily. 90 tablet 3     sertraline 50 MG Oral Tab Take 1.5 tablets (75 mg total) by mouth daily. 135 tablet 1     simvastatin 40 MG Oral Tab Take 1 tablet (40 mg total) by mouth nightly. 90 tablet 3     propranolol 40 MG Oral Tab Take 1 tablet (40 mg total) by mouth 2 (two) times daily. 180 tablet 1     hydrOXYzine 25 MG Oral Tab Take 1 tablet (25 mg total) by mouth 3 (three) times daily as needed for Itching. 180 tablet 0     alendronate (FOSAMAX) 70 MG Oral Tab Take 1 tablet (70 mg total) by mouth every 7 days. 12 tablet 3     LOMAIRA 8 MG Oral Tab TAKE 1 TABLET BY MOUTH DAILY 90 tablet 0     Mometasone Furo-Formoterol Fum (DULERA) 100-5 MCG/ACT Inhalation Aerosol Inhale 2 puffs into the lungs in the morning and 2 puffs before bedtime. 8.8 g 0     levothyroxine (SYNTHROID) 137 MCG Oral Tab Take 137 mcg by mouth before breakfast. 90 tablet 2     semaglutide (OZEMPIC, 0.25 OR 0.5 MG/DOSE,) 2 MG/3ML Subcutaneous Solution Pen-injector Inject 0.5 mg into the skin once a week. 18 mL 0     [] azithromycin (ZITHROMAX Z-KERMIT) 250 MG Oral Tab Take 2 tablets (500 mg total) by mouth daily for 1 day, THEN 1 tablet (250 mg total) daily for 4 days. 6 tablet 0     Incontinence Supply Disposable (COMFORT PROTECT ADULT DIAPER/L) Does not apply Misc 1 each in the morning, at noon, and at bedtime. 270 each 11     ergocalciferol 1.25 MG (33083 UT) Oral Cap TOME GINNY CAPSULA POR VIA ORAL GINNY VES POR SEMANA 12 capsule 3     Spacer/Aero-Holding Chambers Does not apply Device Use with inhaler as needed 1 each 0     Nebulizer Does  not apply Misc by Does not apply route.        No current facility-administered medications for this encounter.       Allergies:   Allergies   Allergen Reactions    Advair Hfa OTHER (SEE COMMENTS)     Tingling sensation in throat        Past Medical History:    Anxiety state    Arthritis    Asthma (HCC)    Chronic pulmonary embolism without acute cor pulmonale (HCC)    Class 2 obesity due to excess calories without serious comorbidity with body mass index (BMI) of 39.0 to 39.9 in adult    Depression    Diabetes (HCC)    Disorder of thyroid    Esophageal reflux    Fibromyalgia    Hearing impairment    left ear    High blood pressure    High cholesterol    Hyperlipidemia    Hyperthyroidism    Insulin resistance    Morbid obesity with BMI of 40.0-44.9, adult (HCC)    Osteoarthritis    Pneumonia due to organism    Prediabetes    Pulmonary embolism (HCC)    Sleep apnea    Visual impairment    wears glasses     Past Surgical History:   Procedure Laterality Date    Cholecystectomy      Needle biopsy left Left     dorian    Other surgical history      gall stones     Family History   Problem Relation Age of Onset    Stroke Mother         CVA    Diabetes Mother     Hypertension Father     Neurological Disorder Father         parkinson's disesae    Arthritis Father     Lipids Father         hyperlipidemia    Cancer Neg     Heart Disease Neg         CAD    Breast Cancer Neg     Ovarian Cancer Neg      Social History     Tobacco Use    Smoking status: Never    Smokeless tobacco: Never   Substance Use Topics    Alcohol use: No       SYSTEM Check if Review is Normal Check if Physical Exam is Normal If not normal, please explain:   HEENT [X ] [ X]    NECK  [X ] [ X]    HEART [X ] [ X]    LUNGS [X ] [ X]    ABDOMEN [X ] [ X]    EXTREMITIES [X ] [ X]    OTHER        I have discussed the risks and benefits and alternatives of the procedure with the patient/family.  They understand and agree to proceed with plan of care.   I have  reviewed the History and Physical done within the last 30 days.  Any changes noted above.    Marcela Juan MD  [facility]

## 2024-06-17 NOTE — ANESTHESIA PREPROCEDURE EVALUATION
Anesthesia PreOp Note    HPI:     Lena Macedo is a 64 year old female who presents for preoperative consultation requested by: Marcela Juan MD    Date of Surgery: 6/17/2024    Procedure(s):  COLONOSCOPY  Indication: Screen for colon cancer    Relevant Problems   No relevant active problems       NPO:  Last Liquid Consumption Date: 06/17/24  Last Liquid Consumption Time: 1230  Last Solid Consumption Date: 06/16/24  Last Solid Consumption Time: 1500  Last Liquid Consumption Date: 06/17/24          History Review:  Patient Active Problem List    Diagnosis Date Noted    Obesity (BMI 30-39.9) 03/13/2024    Subacute cough 02/19/2024    COVID-19 02/19/2024    Type 2 diabetes mellitus without complication, without long-term current use of insulin (HCC) 05/30/2023    Acute cough 09/21/2022    Sore throat 09/21/2022    Suspected COVID-19 virus infection 09/21/2022    Morbid obesity with BMI of 40.0-44.9, adult (HCC) 07/18/2022    Weight gain 07/18/2022    Dyslipidemia 07/18/2022    Lower extremity edema 07/18/2022    Pulmonary hypertension (HCC) 12/26/2019    Chest pain 12/26/2019    GÉNESIS on CPAP 12/26/2019    Chronic pulmonary embolism without acute cor pulmonale (HCC) 10/28/2018    Thyroid nodule 05/11/2018    Class 2 obesity due to excess calories without serious comorbidity with body mass index (BMI) of 39.0 to 39.9 in adult 05/07/2018    Chronic fatigue 05/07/2018    Low vitamin D level 05/07/2018    High cholesterol 05/07/2018    Hypertriglyceridemia 05/07/2018    Primary osteoarthritis 07/25/2014    Fibromyalgia 07/25/2014    Depression 07/25/2014    Generalized osteoarthrosis, involving multiple sites 04/18/2014    Anxiety state 04/07/2014    Malaise and fatigue 04/07/2014    Pain in joint 04/07/2014    Tear film insufficiency 04/07/2014    Abnormal clinical finding 09/11/2006       Past Medical History:    Anxiety state    Arthritis    Asthma (HCC)    Chronic pulmonary embolism without acute cor  pulmonale ([facility])    Class 2 obesity due to excess calories without serious comorbidity with body mass index (BMI) of 39.0 to 39.9 in adult    Depression    Diabetes ([facility])    Disorder of thyroid    Esophageal reflux    Fibromyalgia    Hearing impairment    left ear    High blood pressure    High cholesterol    Hyperlipidemia    Hyperthyroidism    Insulin resistance    Morbid obesity with BMI of 40.0-44.9, adult ([facility])    Osteoarthritis    Pneumonia due to organism    Prediabetes    Pulmonary embolism ([facility])    Sleep apnea    Visual impairment    wears glasses       Past Surgical History:   Procedure Laterality Date    Cholecystectomy      Needle biopsy left Left     dorian    Other surgical history      gall stones       Medications Prior to Admission   Medication Sig Dispense Refill Last Dose    aspirin 81 MG Oral Tab EC Take 1 tablet (81 mg total) by mouth daily.   6/16/2024    pantoprazole 40 MG Oral Tab EC Take 1 tablet (40 mg total) by mouth before breakfast. 90 tablet 3     Fenofibrate 160 MG Oral Tab Take 1 tablet (160 mg total) by mouth daily. 90 tablet 3 6/16/2024    sertraline 50 MG Oral Tab Take 1.5 tablets (75 mg total) by mouth daily. 135 tablet 1     simvastatin 40 MG Oral Tab Take 1 tablet (40 mg total) by mouth nightly. 90 tablet 3 6/15/2024    propranolol 40 MG Oral Tab Take 1 tablet (40 mg total) by mouth 2 (two) times daily. 180 tablet 1 6/16/2024    hydrOXYzine 25 MG Oral Tab Take 1 tablet (25 mg total) by mouth 3 (three) times daily as needed for Itching. 180 tablet 0     alendronate (FOSAMAX) 70 MG Oral Tab Take 1 tablet (70 mg total) by mouth every 7 days. 12 tablet 3     LOMAIRA 8 MG Oral Tab TAKE 1 TABLET BY MOUTH DAILY 90 tablet 0     Mometasone Furo-Formoterol Fum (DULERA) 100-5 MCG/ACT Inhalation Aerosol Inhale 2 puffs into the lungs in the morning and 2 puffs before bedtime. 8.8 g 0     levothyroxine (SYNTHROID) 137 MCG Oral Tab Take 137 mcg by mouth before breakfast. 90 tablet 2      semaglutide (OZEMPIC, 0.25 OR 0.5 MG/DOSE,) 2 MG/3ML Subcutaneous Solution Pen-injector Inject 0.5 mg into the skin once a week. 18 mL 0 2024    [] azithromycin (ZITHROMAX Z-KERMIT) 250 MG Oral Tab Take 2 tablets (500 mg total) by mouth daily for 1 day, THEN 1 tablet (250 mg total) daily for 4 days. 6 tablet 0     Incontinence Supply Disposable (COMFORT PROTECT ADULT DIAPER/L) Does not apply Misc 1 each in the morning, at noon, and at bedtime. 270 each 11     ergocalciferol 1.25 MG (83823 UT) Oral Cap TOME GINNY CAPSULA POR VIA ORAL GINNY VES POR SEMANA 12 capsule 3     Spacer/Aero-Holding Chambers Does not apply Device Use with inhaler as needed 1 each 0     Nebulizer Does not apply Misc by Does not apply route.        Current Facility-Administered Medications Ordered in Epic   Medication Dose Route Frequency Provider Last Rate Last Admin    lactated ringers infusion   Intravenous Continuous Marcela Juan MD         No current [facility]-ordered outpatient medications on file.       Allergies   Allergen Reactions    Advair Hfa OTHER (SEE COMMENTS)     Tingling sensation in throat        Family History   Problem Relation Age of Onset    Stroke Mother         CVA    Diabetes Mother     Hypertension Father     Neurological Disorder Father         parkinson's disesae    Arthritis Father     Lipids Father         hyperlipidemia    Cancer Neg     Heart Disease Neg         CAD    Breast Cancer Neg     Ovarian Cancer Neg      Social History     Socioeconomic History    Marital status:    Tobacco Use    Smoking status: Never    Smokeless tobacco: Never   Vaping Use    Vaping status: Never Used   Substance and Sexual Activity    Alcohol use: No    Drug use: No   Other Topics Concern    Caffeine Concern No       Available pre-op labs reviewed.  Lab Results   Component Value Date    WBC 11.5 (H) 2024    RBC 4.34 2024    HGB 12.4 2024    HCT 37.7 2024    MCV 86.9 2024    MCH  28.6 05/25/2024    MCHC 32.9 05/25/2024    RDW 12.9 05/25/2024    .0 05/25/2024     Lab Results   Component Value Date     05/25/2024    K 4.2 05/25/2024     05/25/2024    CO2 34.0 (H) 05/25/2024    BUN 20 05/25/2024    CREATSERUM 0.77 05/25/2024    GLU 95 05/25/2024    PGLU 91 06/17/2024    CA 10.5 (H) 05/25/2024          Vital Signs:  Body mass index is 40.62 kg/m².   height is 1.549 m (5' 1\") and weight is 97.5 kg (215 lb). Her blood pressure is 153/70 and her pulse is 66. Her respiration is 14 and oxygen saturation is 97%.   Vitals:    03/29/24 1204 06/17/24 1522   BP:  153/70   Pulse:  66   Resp:  14   SpO2:  97%   Weight: 97.5 kg (215 lb)    Height: 1.549 m (5' 1\")         Anesthesia Evaluation     Patient summary reviewed and Nursing notes reviewed    Airway   Mallampati: II  TM distance: >3 FB  Neck ROM: full  Dental - Dentition appears grossly intact     Pulmonary - normal exam   Cardiovascular - normal exam    Neuro/Psych      GI/Hepatic/Renal    (+) bowel prep    Endo/Other    Abdominal                  Anesthesia Plan:   ASA:  3  Plan:   MAC  Informed Consent Plan and Risks Discussed With:  Patient  Discussed plan with:  Surgeon      I have informed Lena Macedo and/or legal guardian or family member of the nature of the anesthetic plan, benefits, risks including possible dental damage if relevant, major complications, and any alternative forms of anesthetic management.   All of the patient's questions were answered to the best of my ability. The patient desires the anesthetic management as planned.  Natalya Rivera CRNA  6/17/2024 4:43 PM  Present on Admission:  **None**

## 2024-06-17 NOTE — OPERATIVE REPORT
COLONOSCOPY REPORT    Lena Macedo     3/10/1960 Age 64 year old   PCP Margaret Correa MD Endoscopist Marcela Juan MD       Date of procedure: 24    Procedure: Colonoscopy w/ cold snare polypectomy     Pre-operative diagnosis: CRC screening     Post-operative diagnosis: colon polyp, hemorrhoids    Medications: MAC    Withdrawal time: 9 minutes    Procedure:  Informed consent was obtained from the patient after the risks of the procedure were discussed, including but not limited to bleeding, perforation, aspiration, infection, or possibility of a missed lesion. After discussions of the risks/benefits and alternatives to this procedure, as well as the planned sedation, the patient was placed in the left lateral decubitus position and begun on continuous blood pressure pulse oximetry and EKG monitoring and this was maintained throughout the procedure. Once an adequate level of sedation was obtained a digital rectal exam was completed. Then the lubricated tip of the [id number] diagnostic video colonoscope was inserted and advanced without difficulty to the cecum using water immersion and CO2 insufflation technique. The cecum was identified by localizing the trifold, the appendix and the ileocecal valve. Withdrawal was begun with thorough washing and careful examination of the colonic walls and folds. A routine second examination of the cecum/ascending colon was performed. Photodocumentation was obtained. The bowel prep was excellent. Views of the colon were excellent with washing. I then carefully withdrew the instrument from the patient who tolerated the procedure well.     Complications: none.    Findings:   1. One polyp noted as follows:      A. 3 mm polyp in the sigmoid colon; sessile morphology; cold snare polypectomy performed, polyp retrieved.    2. Diverticulosis: none.    3. Ileocecal valve appeared healthy and normal.    4. The colonic mucosa throughout the colon showed normal vascular  pattern, without evidence of angioectasias or inflammation.     5. A retroflexed view of the rectum revealed small internal hemorrhoids.    6. MEGGAN: normal rectal tone, no masses palpated.     Impression:   One small (< 5 mm) polyp removed.   Small internal hemorrhoids.   The colon was otherwise normal with glistening mucosa and intact vascular pattern.    Recommend:  Await pathology. The interval for the next colonoscopy will be determined after reviewing pathology. If new signs or symptoms develop, colonoscopy may need to be repeated sooner.   High fiber diet.  Monitor for blood in the stool. If having more than just tinge of blood, call office or go to the ER.  Avoid NSAIDs (motrin, ibuprofen, aleve, advil, naproxen, midol, naprosyn, excedrin) for 14 days.     >>>If tissue was obtained and you have not received your pathology results either by phone or letter within 2 weeks, please call our office at [phone].    Specimens: colon polyp     Blood loss: <1 ml

## 2024-06-17 NOTE — DISCHARGE INSTRUCTIONS
Home Care Instructions for Colonoscopy  Diet:  - Resume your regular diet as tolerated unless otherwise instructed.  - Start with light meals to minimize bloating.  - Do not drink alcohol today.    Medication:  - If you have questions about resuming your normal medications, please contact your Primary Care Physician.    Activities:  - Take it easy today. Do not return to work today.  - Do not drive today.  - Do not operate any machinery today (including kitchen equipment).    Colonoscopy:  - You may notice some rectal \"spotting\" (a little blood on the toilet tissue) for a day or two after the exam. This is normal.  - If you experience any rectal bleeding (not spotting), persistent tenderness or sharp severe abdominal pains, oral temperature over 100 degrees Fahrenheit, light-headedness or dizziness, or any other problems, contact your doctor.      **If unable to reach your doctor, please go to the [facility] Emergency Room**    - Your referring physician will receive a full report of your examination.  - If you do not hear from your doctor's office within two weeks of your biopsy, please call them for your results.    You may be able to see your laboratory results in [email or web address] between 4 and 7 business days.  In some cases, your physician may not have viewed the results before they are released to [email or web address].  If you have questions regarding your results contact the physician who ordered the test/exam by phone or via [email or web address] by choosing \"Ask a Medical Question.\"

## 2024-06-17 NOTE — ANESTHESIA POSTPROCEDURE EVALUATION
Patient: Lena Macedo    Procedure Summary       Date: 06/17/24 Room / Location: [facility] ENDOSCOPY 01 / [facility] ENDOSCOPY    Anesthesia Start: 1650 Anesthesia Stop:     Procedure: COLONOSCOPY Diagnosis:       Screen for colon cancer      (Polyp, Hemorrhoids)    Surgeons: Marcela Juan MD Anesthesiologist: Dawson Ferris CRNA    Anesthesia Type: MAC ASA Status: 3            Anesthesia Type: MAC    Vitals Value Taken Time   /70 06/17/24 1710   Temp 98 °F (36.7 °C) 06/17/24 1710   Pulse 69 06/17/24 1710   Resp 12 06/17/24 1710   SpO2 99 % 06/17/24 1710       [facility] AN Post Evaluation:   Patient Evaluated in PACU  Patient Participation: complete - patient participated  Level of Consciousness: sleepy but conscious  Pain Score: 0  Pain Management: adequate  Airway Patency:patent  Dental exam unchanged from preop  Yes    Cardiovascular Status: acceptable  Respiratory Status: acceptable  Postoperative Hydration acceptable      Dawson Ferris CRNA  6/17/2024 5:10 PM

## 2024-06-17 NOTE — TELEPHONE ENCOUNTER
Language line interpretor  Cara #[id number]      Spoke to patient  She was wondering what time she needed to be at EM  I advised patient to arrive at 2:30pm    Pt verbalized understanding and had no further questions

## 2024-06-18 ENCOUNTER — TELEPHONE (OUTPATIENT)
Facility: CLINIC | Age: 64
End: 2024-06-18

## 2024-06-18 VITALS
HEART RATE: 59 BPM | RESPIRATION RATE: 15 BRPM | OXYGEN SATURATION: 99 % | BODY MASS INDEX: 40.59 KG/M2 | TEMPERATURE: 98 F | SYSTOLIC BLOOD PRESSURE: 153 MMHG | WEIGHT: 215 LBS | HEIGHT: 61 IN | DIASTOLIC BLOOD PRESSURE: 77 MMHG

## 2024-06-18 NOTE — TELEPHONE ENCOUNTER
Recall colonoscopy in 7 years per Dr Juan.    Colon done 6/17/2024    Health maintenance updated and message sent to patient outreach to repeat colonoscopy in 7 years    Results seen by patient via mychart  Seen by patient Lena Macedo on 6/18/2024 12:43 PM

## 2024-06-18 NOTE — TELEPHONE ENCOUNTER
----- Message from Marcela Juan sent at 6/18/2024 12:38 PM CDT -----  GI Staff:    Can you please place recall for this patient to have a colonoscopy in 7 years.    Thank you,  Marcela

## 2024-06-18 NOTE — PROGRESS NOTES
GI Staff:    Can you please place recall for this patient to have a colonoscopy in 7 years.    Thank you,  Marcela

## 2024-07-02 RX ORDER — FLUTICASONE PROPIONATE AND SALMETEROL 250; 50 UG/1; UG/1
1 POWDER RESPIRATORY (INHALATION) EVERY 12 HOURS
Qty: 180 EACH | Refills: 0 | OUTPATIENT
Start: 2024-07-02

## 2024-07-02 NOTE — TELEPHONE ENCOUNTER
Per telephone encounter 4/15/24:    Registered nurse Kathryn C:  \"Patient states she was prescribed Fluticasone-Salmeterol 250-50 MCG/ACT Inhalation Aerosol Powder Breath Activated; however states she had a burning/itching sensation on her tongue with some swelling. Patient has not used inhaler since last week.\"     Lenora Medina, nurse practitioner:  \"Advise patient to stop fluticasone-salmeterol 250-50 MCG/ACT Inhalation Aerosol Powder, Breath Activated   May continue to use Albuterol as needed for asthma  Budesonide-Formoterol Fumarate 160-4.5 MCG/ACT Inhalation Aerosol, Inhale 2 puffs into the lungs 2 (two) times daily sent to pharmacy instead.\"   Requested Prescriptions     Pending Prescriptions Disp Refills    FLUTICASONE-SALMETEROL 250-50 MCG/ACT Inhalation Aerosol Powder, Breath Activated [Pharmacy Med Name: FLUTICASONE-SALMETEROL 250-50] 180 each 0     Sig: INHALE 1 PUFF INTO THE LUNGS EVERY 12 (TWELVE) HOURS

## 2024-07-11 RX ORDER — HYDROXYZINE HYDROCHLORIDE 25 MG/1
25 TABLET, FILM COATED ORAL 3 TIMES DAILY PRN
Qty: 180 TABLET | Refills: 0 | Status: SHIPPED | OUTPATIENT
Start: 2024-07-11 | End: 2024-09-03

## 2024-07-11 NOTE — TELEPHONE ENCOUNTER
Message noted: Chart reviewed and may refill medication as requested. Prescription sent to listed pharmacy.  Further refills as per Dr. Correa

## 2024-07-11 NOTE — TELEPHONE ENCOUNTER
Please Review. Protocol Failed; No Protocol     Requested Prescriptions   Pending Prescriptions Disp Refills    hydrOXYzine 25 MG Oral Tab 180 tablet 3     Sig: Take 1 tablet (25 mg total) by mouth 3 (three) times daily as needed for Itching.       There is no refill protocol information for this order            Future Appointments         Provider Department Appt Notes    In 2 months Osei Anton MD [facility]     In 3 months Margaret Correa MD [facility]           Recent Outpatient Visits              3 weeks ago [facility]    [facility]    1 month ago [facility]    [facility] Visit    3 months ago Diabetes mellitus type 2 in nonobese ([facility])    [facility]    Office Visit    4 months ago Type 2 diabetes mellitus without complication, without long-term current use of insulin ([facility])    [facility] Osei Anton MD    Office Visit    4 months ago Subacute cough    [facility] Kathryn Sarah APRN    Office Visit

## 2024-07-12 RX ORDER — HYDROXYZINE HYDROCHLORIDE 25 MG/1
25 TABLET, FILM COATED ORAL 3 TIMES DAILY PRN
Qty: 180 TABLET | Refills: 0 | OUTPATIENT
Start: 2024-07-12

## 2024-08-07 ENCOUNTER — ORDER TRANSCRIPTION (OUTPATIENT)
Dept: ADMINISTRATIVE | Facility: HOSPITAL | Age: 64
End: 2024-08-07

## 2024-08-07 DIAGNOSIS — Z12.31 ENCOUNTER FOR SCREENING MAMMOGRAM FOR MALIGNANT NEOPLASM OF BREAST: Primary | ICD-10-CM

## 2024-08-16 ENCOUNTER — NURSE TRIAGE (OUTPATIENT)
Dept: FAMILY MEDICINE CLINIC | Facility: CLINIC | Age: 64
End: 2024-08-16

## 2024-08-16 ENCOUNTER — HOSPITAL ENCOUNTER (OUTPATIENT)
Age: 64
Discharge: HOME | End: 2024-08-16
Payer: MEDICARE

## 2024-08-16 VITALS
SYSTOLIC BLOOD PRESSURE: 126 MMHG | DIASTOLIC BLOOD PRESSURE: 66 MMHG | RESPIRATION RATE: 18 BRPM | TEMPERATURE: 97 F | HEART RATE: 69 BPM | OXYGEN SATURATION: 98 %

## 2024-08-16 DIAGNOSIS — T50.905A MEDICATION SIDE EFFECT, INITIAL ENCOUNTER: ICD-10-CM

## 2024-08-16 DIAGNOSIS — R68.2 DRY MOUTH: Primary | ICD-10-CM

## 2024-08-16 RX ORDER — SALIVA STIMULANT COMB. NO.3
SPRAY, NON-AEROSOL (ML) MUCOUS MEMBRANE
Qty: 100 ML | Refills: 0 | Status: SHIPPED | OUTPATIENT
Start: 2024-08-16

## 2024-08-16 NOTE — ED INITIAL ASSESSMENT (HPI)
Pt complaining increase dry mouth from increasing her Solifenacin from 5mg to 10mg.  She increased her medication on 8/7.

## 2024-08-16 NOTE — TELEPHONE ENCOUNTER
Action Requested: Summary for Provider     []  Critical Lab, Recommendations Needed  [] Need Additional Advice  []   FYI    []   Need Orders  [] Need Medications Sent to Pharmacy  []  Other     SUMMARY: Patient advised immediate care .Location to closest immediate care provided.    Patient states she noticed bleeding in her mouth this morning, mouth feels dry and throat sore. Patient states she thinks this is from a medication. Not known which medication. Patient takes a baby aspirin daily.  No bleeding at time of call     Reason for call: Mouth/Lip Problem  Onset: today  Reason for Disposition   Patient wants to be seen    Protocols used: Mouth Symptoms-A-OH

## 2024-08-16 NOTE — ED PROVIDER NOTES
Patient Seen in: [facility]      History     Chief Complaint   Patient presents with    Mouth/Lip Problem     Stated Complaint: Mouth issues  Subjective:   Lena is a 64-year-old female presenting to the immediate care complaining of a dry mouth.  Patient states that she has had a dry mouth since starting her Vesicare and recently had an increase in the medication from 5 mg to 10 mg and the dry mouth seem to increase significantly.  Patient states that this morning she woke up with some dried blood in her mouth.  Patient has not had any subsequent bleeding from the mouth.  Patient states that she has not had any sore throat or difficulty swallowing.  Denies any fever or recent URI complaints.        Objective:   Past Medical History:    Anxiety state    Arthritis    Asthma (HCC)    Chronic pulmonary embolism without acute cor pulmonale (HCC)    Class 2 obesity due to excess calories without serious comorbidity with body mass index (BMI) of 39.0 to 39.9 in adult    Depression    Diabetes (HCC)    Disorder of thyroid    Esophageal reflux    Fibromyalgia    Hearing impairment    left ear    High blood pressure    High cholesterol    Hyperlipidemia    Hyperthyroidism    Insulin resistance    Morbid obesity with BMI of 40.0-44.9, adult (HCC)    Osteoarthritis    Pneumonia due to organism    Prediabetes    Pulmonary embolism (HCC)    Sleep apnea    Visual impairment    wears glasses            Past Surgical History:   Procedure Laterality Date    Cholecystectomy      Colonoscopy N/A 6/17/2024    Procedure: COLONOSCOPY;  Surgeon: Marcela Juan MD;  Location: [facility] ENDOSCOPY    Needle biopsy left Left     [place]    Other surgical history      gall stones              Social History     Socioeconomic History    Marital status:    Tobacco Use    Smoking status: Never    Smokeless tobacco: Never   Vaping Use    Vaping status: Never Used   Substance and Sexual Activity    Alcohol use: No     Drug use: No   Other Topics Concern    Caffeine Concern No            Review of Systems    Positive for stated complaint: Mouth/Lip Problem    Other systems are as noted in HPI.  Constitutional and vital signs reviewed.      All other systems reviewed and negative except as noted above.    Physical Exam     ED Triage Vitals [08/16/24 1148]   /66   Pulse 69   Resp 18   Temp 97 °F (36.1 °C)   Temp src Temporal   SpO2 98 %   O2 Device None (Room air)     Current:/66   Pulse 69   Temp 97 °F (36.1 °C) (Temporal)   Resp 18   SpO2 98%     Physical Exam  Vitals and nursing note reviewed.   Constitutional:       General: She is not in acute distress.     Appearance: Normal appearance. She is not ill-appearing, toxic-appearing or diaphoretic.   HENT:      Head: Normocephalic.      Mouth/Throat:      Lips: Pink.      Mouth: Mucous membranes are dry. No oral lesions.      Pharynx: Oropharynx is clear. Uvula midline. No pharyngeal swelling, oropharyngeal exudate, posterior oropharyngeal erythema or uvula swelling.      Tonsils: No tonsillar exudate or tonsillar abscesses. 0 on the right. 0 on the left.   Cardiovascular:      Rate and Rhythm: Normal rate.   Pulmonary:      Effort: Pulmonary effort is normal.   Musculoskeletal:         General: Normal range of motion.      Cervical back: Normal range of motion.   Skin:     General: Skin is warm and dry.      Capillary Refill: Capillary refill takes less than 2 seconds.   Neurological:      General: No focal deficit present.      Mental Status: She is alert and oriented to person, place, and time.   Psychiatric:         Mood and Affect: Mood normal.         Behavior: Behavior normal.         Thought Content: Thought content normal.         Judgment: Judgment normal.         ED Course   Radiology:  No results found.  Labs Reviewed - No data to display    MDM     Medical Decision Making  Differential diagnoses reflecting the complexity of care include but are not  limited to medication side effect, URI, oral infection.    Comorbidities that add complexity to management include: None  History obtained by an independent source was from: Patient  Patient is well appearing, non-toxic and in no acute distress.  Vital signs are stable.   Patient's history and physical exam are consistent with medication side effect to Vesicare.  With recent increase she had increased dryness to the mouth.  I discussed with the patient that this is a normal side effect.  Patient was concerned because of the dried blood in her mouth this morning.  There is no active bleeding now.  No subsequent bleeding since this morning.  No oral lesions or signs of infection.  I sent a prescription for Biotene oral mouth rinse to be used up to 4 times a day as needed.  I recommended that patient put a humidifier in her bedroom for sleeping to increase moisture in the air.  I also recommended using Ocean saline nasal spray to help add moisture to the oropharynx.  Recommended the patient follow-up with the prescriber of the Vesicare to discuss dosing.  Recommended that if the patient develops any difficulty swallowing any other worsening or concerning complaints that she go to the emergency department.  ED precautions discussed.  Patient (guardian) advised to follow up with PCP in 2-3 days.  Patient (guardian) agrees with this plan of care.  Patient (guardian) verbalizes understanding of discharge instructions and plan of care.  Entire visit was conducted with  #[id number].      Risk  OTC drugs.  Prescription drug management.        Disposition and Plan     Clinical Impression:  1. Dry mouth    2. Medication side effect, initial encounter         Disposition:  Discharge  8/16/2024 12:29 pm    Follow-up:  Margaret Correa MD  [address] [place] [id number]  [phone]                Medications Prescribed:  Discharge Medication List as of 8/16/2024 12:29 PM        START taking these medications     Details   artificial saliva substitute Mouth/Throat Solution Swish, gargle and spit QID PRN, Normal, Disp-100 mL, R-0

## 2024-08-16 NOTE — DISCHARGE INSTRUCTIONS
La sequedad de boca que experimenta se debe a esquivel medicamento Vesicare.  Es un efecto secundario normal del medicamento.  Te envié sterling receta de Biotene, que es un enjuague bucal que puede ayudar con la boca seca.  Por favor aumente esquivel ingesta de líquidos.  Puedes colocar un humidificador en tu dormitorio por la noche, lo que ayudará a aumentar la humedad en el aire.    También puede utilizar el aerosol nasal salino Ocean para ayudar a hidratar la nariz y la garganta.  La king seca en esquivel boca esta mañana probablemente se debió a la sequedad del medicamento.  Si desarrolla algún sangrado persistente debe acudir al servicio de urgencias.  De lo contrario, vicky un seguimiento con esquivel médico de atención primaria.      The dry mouth that you are experiencing is due to your Vesicare medication.  It is a normal side effect of the medication.  I sent you a prescription for Biotene which is a mouthwash that can help with dry mouth.  Please increase your fluid intake.  You can put a humidifier in your bedroom at nighttime which will help increase moisture in the air.    You can also use Ocean saline nasal spray to help add moisture to your nose and throat.  The dried blood in your mouth this morning it was likely due to to the dryness from the medication.  If you develop any persistent bleeding you should go to the emergency department.  Otherwise follow-up with your primary care doctor.

## 2024-09-05 RX ORDER — HYDROXYZINE HYDROCHLORIDE 25 MG/1
25 TABLET, FILM COATED ORAL 3 TIMES DAILY PRN
Qty: 90 TABLET | Refills: 0 | Status: SHIPPED | OUTPATIENT
Start: 2024-09-05 | End: 2024-10-05

## 2024-09-05 NOTE — TELEPHONE ENCOUNTER
Please Review. Protocol Failed; No Protocol     Requested Prescriptions   Pending Prescriptions Disp Refills    hydrOXYzine 25 MG Oral Tab 270 tablet 0     Sig: Take 1 tablet (25 mg total) by mouth 3 (three) times daily as needed for Itching.       There is no refill protocol information for this order            Future Appointments         Provider Department Appt Notes    In 1 week Osei Anton MD [facility]     In 1 month Margaret Correa MD [facility]     In 3 months [address] [facility]           Recent Outpatient Visits              2 months ago Abnormal EKG    [facility]    Office Visit    3 months ago Screening for colon cancer    [facility] Margaret Dominguez MD    Office Visit    5 months ago Diabetes mellitus type 2 in nonobese ([facility])    [facility] Margaret Correa MD    Office Visit    5 months ago Type 2 diabetes mellitus without complication, without long-term current use of insulin ([facility])    [facility] Osei Anton MD    Office Visit    6 months ago Subacute cough    [facility] Kathryn Sarah APRN    Office Visit

## 2024-09-25 ENCOUNTER — TELEPHONE (OUTPATIENT)
Dept: FAMILY MEDICINE CLINIC | Facility: CLINIC | Age: 64
End: 2024-09-25

## 2024-09-25 RX ORDER — PROPRANOLOL HYDROCHLORIDE 40 MG/1
40 TABLET ORAL 2 TIMES DAILY
Qty: 180 TABLET | Refills: 3 | Status: SHIPPED | OUTPATIENT
Start: 2024-09-25

## 2024-09-25 RX ORDER — HYDROXYZINE PAMOATE 25 MG/1
25 CAPSULE ORAL 3 TIMES DAILY PRN
Qty: 90 CAPSULE | Refills: 0 | Status: SHIPPED | OUTPATIENT
Start: 2024-09-25

## 2024-09-25 NOTE — TELEPHONE ENCOUNTER
Patient is requesting the medication refill but does not have the name of the medication and mentions she does not have any remaining     Blood pressure medication    Medication for stress and for itchiness pills but prefers the green capsules as previously prescribed    Patient is requesting a call back from RN to go over medications    [facility] in [place] confirmed preferred

## 2024-09-25 NOTE — TELEPHONE ENCOUNTER
With [employer]  Lena ID# [id number]    Verified name and .    Patient states that she picked up the Hydroxyzine 25 mg- received tablets- states that in the past she had been prescribed the medication as \"green capsules\" and that those worked better for her, were easier to swallow for her, and were less less bitter.    She is requesting that prescription for Hydroxyzine Pamoate 25 mg capsules be prescribed instead of Hydroxyzine HCl 25 mg tablets.    She is also asking for refill on Propanolol.    Medication pended for your review and approval.

## 2024-09-25 NOTE — TELEPHONE ENCOUNTER
Dear Nursing staff,    Patient is requesting a call from RN to go over medication.       Patient is requesting the medication refill but does not have the name of the medication and mentions she does not have any remaining      Blood pressure medication     Medication for stress and for itchiness pills but prefers the green capsules as previously prescribed     Patient is requesting a call back from RN to go over medications     [facility] in [facility] confirmed preferred            Thank You ,  Emelia Kumar

## 2024-10-21 NOTE — TELEPHONE ENCOUNTER
Please Review. Protocol Failed; No Protocol     Requested Prescriptions   Pending Prescriptions Disp Refills    hydrOXYzine Pamoate 25 MG Oral Cap 90 capsule 0     Sig: Take 1 capsule (25 mg total) by mouth 3 (three) times daily as needed for Itching.       There is no refill protocol information for this order            Future Appointments         Provider Department Appt Notes    In 1 week Margaret Correa MD [facility]     In 1 month [address]     In 5 months Osei Anton MD [facility] Same as last visit          Recent Outpatient Visits              4 months ago Abnormal EKG    [facility] Margaret Correa MD    Office Visit    5 months ago Screening for colon cancer    [facility]    Office Visit    6 months ago Diabetes mellitus type 2 in nonobese ([facility])    [facility]    Office Visit    7 months ago Type 2 diabetes mellitus without complication, without long-term current use of insulin ([facility])    [facility] Osei Anton MD    Office Visit    8 months ago Subacute cough    [facility] Kathryn Sarah APRN    Office Visit

## 2024-10-22 RX ORDER — HYDROXYZINE PAMOATE 25 MG/1
25 CAPSULE ORAL 3 TIMES DAILY PRN
Qty: 90 CAPSULE | Refills: 0 | Status: SHIPPED | OUTPATIENT
Start: 2024-10-22

## 2024-11-01 ENCOUNTER — LAB ENCOUNTER (OUTPATIENT)
Dept: LAB | Age: 64
End: 2024-11-01
Attending: FAMILY MEDICINE
Payer: MEDICARE

## 2024-11-01 ENCOUNTER — OFFICE VISIT (OUTPATIENT)
Dept: FAMILY MEDICINE CLINIC | Facility: CLINIC | Age: 64
End: 2024-11-01

## 2024-11-01 VITALS
RESPIRATION RATE: 20 BRPM | HEART RATE: 63 BPM | DIASTOLIC BLOOD PRESSURE: 57 MMHG | BODY MASS INDEX: 41.91 KG/M2 | OXYGEN SATURATION: 95 % | WEIGHT: 222 LBS | TEMPERATURE: 98 F | SYSTOLIC BLOOD PRESSURE: 114 MMHG | HEIGHT: 61 IN

## 2024-11-01 DIAGNOSIS — E11.9 DIABETES MELLITUS TYPE 2 IN NONOBESE (HCC): Primary | ICD-10-CM

## 2024-11-01 DIAGNOSIS — Z00.00 ANNUAL PHYSICAL EXAM: ICD-10-CM

## 2024-11-01 PROBLEM — J44.89 ASTHMA WITH COPD (CHRONIC OBSTRUCTIVE PULMONARY DISEASE) (HCC): Chronic | Status: ACTIVE | Noted: 2024-11-01

## 2024-11-01 LAB
ALBUMIN SERPL-MCNC: 4.7 G/DL (ref 3.2–4.8)
ALBUMIN/GLOB SERPL: 1.5 {RATIO} (ref 1–2)
ALP LIVER SERPL-CCNC: 48 U/L
ALT SERPL-CCNC: 21 U/L
ANION GAP SERPL CALC-SCNC: 6 MMOL/L (ref 0–18)
AST SERPL-CCNC: 25 U/L (ref ?–34)
BILIRUB SERPL-MCNC: 0.4 MG/DL (ref 0.2–1.1)
BUN BLD-MCNC: 21 MG/DL (ref 9–23)
BUN/CREAT SERPL: 26.3 (ref 10–20)
CALCIUM BLD-MCNC: 9.6 MG/DL (ref 8.7–10.4)
CHLORIDE SERPL-SCNC: 105 MMOL/L (ref 98–112)
CHOLEST SERPL-MCNC: 160 MG/DL (ref ?–200)
CO2 SERPL-SCNC: 30 MMOL/L (ref 21–32)
CREAT BLD-MCNC: 0.8 MG/DL
EGFRCR SERPLBLD CKD-EPI 2021: 82 ML/MIN/1.73M2 (ref 60–?)
FASTING PATIENT LIPID ANSWER: YES
FASTING STATUS PATIENT QL REPORTED: YES
GLOBULIN PLAS-MCNC: 3.2 G/DL (ref 2–3.5)
GLUCOSE BLD-MCNC: 90 MG/DL (ref 70–99)
HDLC SERPL-MCNC: 47 MG/DL (ref 40–59)
HEMOGLOBIN A1C: 6.5 % (ref 4.3–5.6)
LDLC SERPL CALC-MCNC: 89 MG/DL (ref ?–100)
NONHDLC SERPL-MCNC: 113 MG/DL (ref ?–130)
OSMOLALITY SERPL CALC.SUM OF ELEC: 295 MOSM/KG (ref 275–295)
POTASSIUM SERPL-SCNC: 4.6 MMOL/L (ref 3.5–5.1)
PROT SERPL-MCNC: 7.9 G/DL (ref 5.7–8.2)
SODIUM SERPL-SCNC: 141 MMOL/L (ref 136–145)
TRIGL SERPL-MCNC: 134 MG/DL (ref 30–149)
TSI SER-ACNC: 1.04 UIU/ML (ref 0.55–4.78)
VLDLC SERPL CALC-MCNC: 22 MG/DL (ref 0–30)

## 2024-11-01 PROCEDURE — 80053 COMPREHEN METABOLIC PANEL: CPT

## 2024-11-01 PROCEDURE — 80061 LIPID PANEL: CPT

## 2024-11-01 PROCEDURE — 36415 COLL VENOUS BLD VENIPUNCTURE: CPT

## 2024-11-01 PROCEDURE — 84443 ASSAY THYROID STIM HORMONE: CPT

## 2024-11-01 RX ORDER — BENZONATATE 200 MG/1
200 CAPSULE ORAL 3 TIMES DAILY PRN
Qty: 30 CAPSULE | Refills: 0 | Status: SHIPPED | OUTPATIENT
Start: 2024-11-01 | End: 2024-11-11

## 2024-11-01 RX ORDER — SOLIFENACIN SUCCINATE 10 MG/1
10 TABLET, FILM COATED ORAL DAILY
COMMUNITY
Start: 2024-08-07

## 2024-11-01 RX ORDER — AMOXICILLIN 875 MG/1
875 TABLET, COATED ORAL 2 TIMES DAILY
Qty: 20 TABLET | Refills: 0 | Status: SHIPPED | OUTPATIENT
Start: 2024-11-01 | End: 2024-11-11

## 2024-11-01 RX ORDER — SOLIFENACIN SUCCINATE 5 MG/1
5 TABLET, FILM COATED ORAL DAILY
COMMUNITY
Start: 2024-09-13

## 2024-11-01 RX ORDER — CLOBETASOL PROPIONATE 0.5 MG/G
60 CREAM TOPICAL 2 TIMES DAILY
COMMUNITY
Start: 2024-06-16

## 2024-11-01 NOTE — PROGRESS NOTES
Subjective:   Patient ID: Lena Macedo is a 64 year old female.    HPI  Here for annual physical   Also f/u diabetes HbA1C is 6.5 now   Patient for f/u hypertension   Denies any chest pain shortness of breath or headaches.   Monitoring blood pressure at home and is below 135/85. Needs refill of medications.   Patient also has cough sinus pressure and sore thrpat for last 7 days not getting better   History/Other:   Review of Systems    Constitutional: Negative.  Negative for activity change, appetite change, diaphoresis and fatigue.   Heent see hpi   Respiratory: see hpi  Cardiovascular: Negative.  Negative for chest pain, palpitations and leg swelling.   Gastrointestinal: Negative.  Negative for abdominal pain.   Skin: Negative.           Psychiatric/Behavioral: anxiety controlled         Current Outpatient Medications   Medication Sig Dispense Refill    clobetasol 0.05 % External Cream Apply 60 g topically 2 (two) times daily.      Solifenacin Succinate 10 MG Oral Tab Take 1 tablet (10 mg total) by mouth daily.      Solifenacin Succinate 5 MG Oral Tab Take 1 tablet (5 mg total) by mouth daily.      amoxicillin 875 MG Oral Tab Take 1 tablet (875 mg total) by mouth 2 (two) times daily for 10 days. 20 tablet 0    benzonatate 200 MG Oral Cap Take 1 capsule (200 mg total) by mouth 3 (three) times daily as needed for cough. 30 capsule 0    hydrOXYzine Pamoate 25 MG Oral Cap Take 1 capsule (25 mg total) by mouth 3 (three) times daily as needed for Itching. 90 capsule 0    propranolol 40 MG Oral Tab Take 1 tablet (40 mg total) by mouth 2 (two) times daily. 180 tablet 3    artificial saliva substitute Mouth/Throat Solution Swish, gargle and spit QID  mL 0    aspirin 81 MG Oral Tab EC Take 1 tablet (81 mg total) by mouth daily.      alendronate (FOSAMAX) 70 MG Oral Tab Take 1 tablet (70 mg total) by mouth every 7 days. 12 tablet 3    LOMAIRA 8 MG Oral Tab TAKE 1 TABLET BY MOUTH DAILY 90 tablet 0    Mometasone  Furo-Formoterol Fum (DULERA) 100-5 MCG/ACT Inhalation Aerosol Inhale 2 puffs into the lungs in the morning and 2 puffs before bedtime. 8.8 g 0    levothyroxine (SYNTHROID) 137 MCG Oral Tab Take 137 mcg by mouth before breakfast. 90 tablet 2    semaglutide (OZEMPIC, 0.25 OR 0.5 MG/DOSE,) 2 MG/3ML Subcutaneous Solution Pen-injector Inject 0.5 mg into the skin once a week. 18 mL 0    Incontinence Supply Disposable (COMFORT PROTECT ADULT DIAPER/L) Does not apply Misc 1 each in the morning, at noon, and at bedtime. 270 each 11    pantoprazole 40 MG Oral Tab EC Take 1 tablet (40 mg total) by mouth before breakfast. 90 tablet 3    Fenofibrate 160 MG Oral Tab Take 1 tablet (160 mg total) by mouth daily. 90 tablet 3    ergocalciferol 1.25 MG (10844 UT) Oral Cap TOME GINNY CAPSULA POR VIA ORAL GINNY VES POR SEMANA 12 capsule 3    sertraline 50 MG Oral Tab Take 1.5 tablets (75 mg total) by mouth daily. 135 tablet 1    simvastatin 40 MG Oral Tab Take 1 tablet (40 mg total) by mouth nightly. 90 tablet 3    Spacer/Aero-Holding Chambers Does not apply Device Use with inhaler as needed 1 each 0    Nebulizer Does not apply Misc by Does not apply route.       Allergies:Allergies[1]    Objective:   Physical Exam  Constitutional:       Appearance: She is well-developed.   HENT:      Nose: Congestion present. No rhinorrhea.   Cardiovascular:      Rate and Rhythm: Normal rate and regular rhythm.      Heart sounds: Normal heart sounds.   Pulmonary:      Effort: Pulmonary effort is normal.      Breath sounds: Normal breath sounds.   Abdominal:      General: Bowel sounds are normal.      Palpations: Abdomen is soft.   Skin:     General: Skin is warm and dry.   Neurological:      Mental Status: She is alert.      Deep Tendon Reflexes: Reflexes are normal and symmetric.         Assessment & Plan:   1. Diabetes mellitus type 2 in nonobese (HCC)    2. Annual physical exam      Continue same medd for diabetes   3. Hypetrtension cpm   Anxiety cpm    Sinusitis - start amoxil  Orders Placed This Encounter   Procedures    POC Hemoglobin A1C    Comp Metabolic Panel (14)    Lipid Panel    Assay, Thyroid Stim Hormone       Meds This Visit:  Requested Prescriptions     Signed Prescriptions Disp Refills    amoxicillin 875 MG Oral Tab 20 tablet 0     Sig: Take 1 tablet (875 mg total) by mouth 2 (two) times daily for 10 days.    benzonatate 200 MG Oral Cap 30 capsule 0     Sig: Take 1 capsule (200 mg total) by mouth 3 (three) times daily as needed for cough.       Imaging & Referrals:  OPHTHALMOLOGY - EXTERNAL       [1]   Allergies  Allergen Reactions    Advair Hfa OTHER (SEE COMMENTS)     Tingling sensation in throat

## 2024-11-15 ENCOUNTER — PATIENT MESSAGE (OUTPATIENT)
Dept: SURGERY | Facility: CLINIC | Age: 64
End: 2024-11-15

## 2024-11-15 DIAGNOSIS — E11.9 TYPE 2 DIABETES MELLITUS WITHOUT COMPLICATION, WITHOUT LONG-TERM CURRENT USE OF INSULIN (HCC): ICD-10-CM

## 2024-11-15 RX ORDER — SEMAGLUTIDE 0.68 MG/ML
INJECTION, SOLUTION SUBCUTANEOUS
Refills: 1 | OUTPATIENT
Start: 2024-11-15

## 2024-11-18 RX ORDER — SEMAGLUTIDE 0.68 MG/ML
INJECTION, SOLUTION SUBCUTANEOUS
Refills: 1 | OUTPATIENT
Start: 2024-11-18

## 2024-11-22 NOTE — TELEPHONE ENCOUNTER
With  Nicolas #[id number], patient indicated that she took her last ozempic 0.5mg dose today and out of medication. Patient indicated that tried to get a refill through Dr Anton but was unable to get the refill. Earliest appointment she was able to get with Dr Anton was 3/21/2025. Wanted to know if Dr Correa can fill the ozempic? Please advise.

## 2024-11-25 DIAGNOSIS — E11.9 TYPE 2 DIABETES MELLITUS WITHOUT COMPLICATION, WITHOUT LONG-TERM CURRENT USE OF INSULIN (HCC): ICD-10-CM

## 2024-11-26 RX ORDER — SEMAGLUTIDE 0.68 MG/ML
0.5 INJECTION, SOLUTION SUBCUTANEOUS WEEKLY
Qty: 18 ML | Refills: 0 | Status: SHIPPED | OUTPATIENT
Start: 2024-11-26

## 2024-11-26 RX ORDER — SEMAGLUTIDE 0.68 MG/ML
INJECTION, SOLUTION SUBCUTANEOUS
Qty: 6 ML | Refills: 1 | Status: SHIPPED | OUTPATIENT
Start: 2024-11-26

## 2024-12-16 ENCOUNTER — HOSPITAL ENCOUNTER (OUTPATIENT)
Dept: MAMMOGRAPHY | Facility: HOSPITAL | Age: 64
Discharge: HOME | End: 2024-12-16
Attending: FAMILY MEDICINE
Payer: MEDICARE

## 2024-12-16 DIAGNOSIS — Z12.31 ENCOUNTER FOR SCREENING MAMMOGRAM FOR MALIGNANT NEOPLASM OF BREAST: ICD-10-CM

## 2024-12-16 PROCEDURE — 77063 BREAST TOMOSYNTHESIS BI: CPT | Performed by: FAMILY MEDICINE

## 2024-12-16 PROCEDURE — 77067 SCR MAMMO BI INCL CAD: CPT | Performed by: FAMILY MEDICINE

## 2024-12-17 RX ORDER — SIMVASTATIN 40 MG
TABLET ORAL
Qty: 90 TABLET | Refills: 3 | OUTPATIENT
Start: 2024-12-17

## 2024-12-31 ENCOUNTER — OFFICE VISIT (OUTPATIENT)
Dept: FAMILY MEDICINE CLINIC | Facility: CLINIC | Age: 64
End: 2024-12-31
Payer: COMMERCIAL

## 2024-12-31 VITALS
HEART RATE: 108 BPM | HEIGHT: 61 IN | BODY MASS INDEX: 42.48 KG/M2 | DIASTOLIC BLOOD PRESSURE: 70 MMHG | RESPIRATION RATE: 16 BRPM | TEMPERATURE: 102 F | WEIGHT: 225 LBS | OXYGEN SATURATION: 99 % | SYSTOLIC BLOOD PRESSURE: 118 MMHG

## 2024-12-31 DIAGNOSIS — R50.9 FEVER, UNSPECIFIED FEVER CAUSE: ICD-10-CM

## 2024-12-31 DIAGNOSIS — J11.1 INFLUENZA-LIKE ILLNESS: Primary | ICD-10-CM

## 2024-12-31 PROBLEM — R00.2 PALPITATIONS: Status: ACTIVE | Noted: 2024-09-13

## 2024-12-31 PROBLEM — R06.09 CHRONIC DYSPNEA: Status: ACTIVE | Noted: 2024-09-13

## 2024-12-31 PROCEDURE — 3074F SYST BP LT 130 MM HG: CPT | Performed by: NURSE PRACTITIONER

## 2024-12-31 PROCEDURE — 99213 OFFICE O/P EST LOW 20 MIN: CPT | Performed by: NURSE PRACTITIONER

## 2024-12-31 PROCEDURE — 3008F BODY MASS INDEX DOCD: CPT | Performed by: NURSE PRACTITIONER

## 2024-12-31 PROCEDURE — 3078F DIAST BP <80 MM HG: CPT | Performed by: NURSE PRACTITIONER

## 2024-12-31 PROCEDURE — 87637 SARSCOV2&INF A&B&RSV AMP PRB: CPT | Performed by: NURSE PRACTITIONER

## 2024-12-31 RX ORDER — OSELTAMIVIR PHOSPHATE 75 MG/1
75 CAPSULE ORAL 2 TIMES DAILY
Qty: 10 CAPSULE | Refills: 0 | Status: SHIPPED | OUTPATIENT
Start: 2024-12-31 | End: 2025-01-06

## 2024-12-31 NOTE — PROGRESS NOTES
Subjective:   Patient ID: Lena Macedo is a 64 year old female.    Patient is a 64 year old female who presents today with complaints of cough, sore throat, headaches, fatigue, fevers, body aches, chills, runny nose, nasal congestion, left ear pressure, dry nose and sneezing x last night. Denies sore throat, sinus pressure, pnd, n/v/d, abdominal pain, SOB, wheezing, rashes. Decreased appetite, tolerating fluids. Attempted treatment prior to arrival = Tylenol.         History/Other:   Review of Systems   Constitutional:  Positive for appetite change, chills, fatigue and fever.   HENT:  Positive for congestion, rhinorrhea and sneezing. Negative for ear pain (+ pressure), postnasal drip, sinus pressure and sore throat.    Respiratory:  Positive for cough. Negative for shortness of breath and wheezing.    Gastrointestinal:  Negative for abdominal pain, diarrhea, nausea and vomiting.   Musculoskeletal:  Positive for myalgias.   Skin:  Negative for rash.   Neurological:  Positive for headaches.     Current Outpatient Medications   Medication Sig Dispense Refill    oseltamivir 75 MG Oral Cap Take 1 capsule (75 mg total) by mouth 2 (two) times daily for 5 days. 10 capsule 0    semaglutide (OZEMPIC, 0.25 OR 0.5 MG/DOSE,) 2 MG/3ML Subcutaneous Solution Pen-injector Inject 0.5 mg into the skin once a week. 18 mL 0    semaglutide (OZEMPIC, 0.25 OR 0.5 MG/DOSE,) 2 MG/3ML Subcutaneous Solution Pen-injector INYECTE 0.5 MG INTO THE SKIN GINNY VEZ POR SEMANA 6 mL 1    clobetasol 0.05 % External Cream Apply 60 g topically 2 (two) times daily.      Solifenacin Succinate 10 MG Oral Tab Take 1 tablet (10 mg total) by mouth daily.      Solifenacin Succinate 5 MG Oral Tab Take 1 tablet (5 mg total) by mouth daily.      hydrOXYzine Pamoate 25 MG Oral Cap Take 1 capsule (25 mg total) by mouth 3 (three) times daily as needed for Itching. 90 capsule 0    propranolol 40 MG Oral Tab Take 1 tablet (40 mg total) by mouth 2 (two) times daily. 180  tablet 3    artificial saliva substitute Mouth/Throat Solution Swish, gargle and spit QID  mL 0    aspirin 81 MG Oral Tab EC Take 1 tablet (81 mg total) by mouth daily.      alendronate (FOSAMAX) 70 MG Oral Tab Take 1 tablet (70 mg total) by mouth every 7 days. 12 tablet 3    LOMAIRA 8 MG Oral Tab TAKE 1 TABLET BY MOUTH DAILY 90 tablet 0    Mometasone Furo-Formoterol Fum (DULERA) 100-5 MCG/ACT Inhalation Aerosol Inhale 2 puffs into the lungs in the morning and 2 puffs before bedtime. 8.8 g 0    levothyroxine (SYNTHROID) 137 MCG Oral Tab Take 137 mcg by mouth before breakfast. 90 tablet 2    Incontinence Supply Disposable (COMFORT PROTECT ADULT DIAPER/L) Does not apply Misc 1 each in the morning, at noon, and at bedtime. 270 each 11    pantoprazole 40 MG Oral Tab EC Take 1 tablet (40 mg total) by mouth before breakfast. 90 tablet 3    Fenofibrate 160 MG Oral Tab Take 1 tablet (160 mg total) by mouth daily. 90 tablet 3    ergocalciferol 1.25 MG (14651 UT) Oral Cap TOME GINNY CAPSULA POR VIA ORAL GINNY VES POR SEMANA 12 capsule 3    sertraline 50 MG Oral Tab Take 1.5 tablets (75 mg total) by mouth daily. 135 tablet 1    simvastatin 40 MG Oral Tab Take 1 tablet (40 mg total) by mouth nightly. 90 tablet 3    Spacer/Aero-Holding Chambers Does not apply Device Use with inhaler as needed 1 each 0    Nebulizer Does not apply Misc by Does not apply route.       Allergies:Allergies[1]    /70   Pulse 108   Temp (!) 102 °F (38.9 °C) (Tympanic)   Resp 16   Ht 5' 1\" (1.549 m)   Wt 225 lb (102.1 kg)   SpO2 99%   BMI 42.51 kg/m²       Objective:   Physical Exam  Vitals reviewed.   Constitutional:       General: She is awake. She is not in acute distress.     Appearance: Normal appearance. She is well-developed and well-groomed. She is ill-appearing. She is not toxic-appearing or diaphoretic.   HENT:      Head: Normocephalic and atraumatic.      Right Ear: Tympanic membrane, ear canal and external ear normal.       Left Ear: Tympanic membrane, ear canal and external ear normal.      Nose: Rhinorrhea present.      Mouth/Throat:      Lips: Pink.      Mouth: Mucous membranes are moist. No oral lesions.      Pharynx: Oropharynx is clear. Uvula midline.   Cardiovascular:      Rate and Rhythm: Normal rate and regular rhythm.   Pulmonary:      Effort: Pulmonary effort is normal. No respiratory distress.      Breath sounds: Normal breath sounds and air entry. No decreased breath sounds, wheezing, rhonchi or rales.   Lymphadenopathy:      Head:      Right side of head: No tonsillar adenopathy.      Left side of head: No tonsillar adenopathy.      Cervical: No cervical adenopathy.   Skin:     General: Skin is warm and dry.   Neurological:      Mental Status: She is alert and oriented to person, place, and time.   Psychiatric:         Behavior: Behavior is cooperative.         Assessment & Plan:   1. Influenza-like illness    2. Fever, unspecified fever cause        Orders Placed This Encounter   Procedures    SARS-CoV-2/Flu A and B/RSV by PCR ([place]) [E] *Collect in Office!       Meds This Visit:  Requested Prescriptions     Signed Prescriptions Disp Refills    oseltamivir 75 MG Oral Cap 10 capsule 0     Sig: Take 1 capsule (75 mg total) by mouth 2 (two) times daily for 5 days.      ID #[id number], Bobby, used for visit.  Quad screen collected and sent - will notify of results.  Reassuring physical exam findings. HR slightly elevated, temp elevated at 102F. No sign of bacterial etiology, RDS or dehydration at this time.  Discussed risks/benefits of Tamiflu. Would like to proceed today.  Supportive care and return to care measures reviewed.  Patient v/u and is comfortable with this plan.    Patient Instructions   Tylenol and Motrin as needed  Rest, push fluids  Mucinex DM over the counter for nasal congestion/cough  START daily antihistamine (Zyrtec, Claritin, Allegra) and choose one of those. Take daily for 1-2 weeks  to help with any post nasal drainage/sore throat  START Flonase nasal spray daily. 1 spray in each nostril  Take Tamiflu as prescribed  Return to care for new/worsening symptoms or if symptoms persist for 2+ weeks without any improvement   We will notify you of nasal swab results when received            [1]   Allergies  Allergen Reactions    Advair Hfa OTHER (SEE COMMENTS)     Tingling sensation in throat

## 2024-12-31 NOTE — PATIENT INSTRUCTIONS
Tylenol and Motrin as needed  Rest, push fluids  Mucinex DM over the counter for nasal congestion/cough  START daily antihistamine (Zyrtec, Claritin, Allegra) and choose one of those. Take daily for 1-2 weeks to help with any post nasal drainage/sore throat  START Flonase nasal spray daily. 1 spray in each nostril  Take Tamiflu as prescribed  Return to care for new/worsening symptoms or if symptoms persist for 2+ weeks without any improvement   We will notify you of nasal swab results when received

## 2025-01-01 LAB
FLUAV + FLUBV RNA SPEC NAA+PROBE: DETECTED
FLUAV + FLUBV RNA SPEC NAA+PROBE: NOT DETECTED
RSV RNA SPEC NAA+PROBE: NOT DETECTED
SARS-COV-2 RNA RESP QL NAA+PROBE: NOT DETECTED

## 2025-01-02 ENCOUNTER — HOSPITAL ENCOUNTER (INPATIENT)
Facility: HOSPITAL | Age: 65
LOS: 4 days | Discharge: HOME | End: 2025-01-06
Attending: EMERGENCY MEDICINE | Admitting: HOSPITALIST
Payer: MEDICARE

## 2025-01-02 ENCOUNTER — APPOINTMENT (OUTPATIENT)
Dept: GENERAL RADIOLOGY | Facility: HOSPITAL | Age: 65
End: 2025-01-02
Payer: MEDICARE

## 2025-01-02 DIAGNOSIS — J10.1 INFLUENZA A: ICD-10-CM

## 2025-01-02 DIAGNOSIS — J44.1 ACUTE EXACERBATION OF CHRONIC OBSTRUCTIVE PULMONARY DISEASE (COPD) (HCC): Primary | ICD-10-CM

## 2025-01-02 DIAGNOSIS — J11.1 INFLUENZAL BRONCHITIS: ICD-10-CM

## 2025-01-02 LAB
ALBUMIN SERPL-MCNC: 4.4 G/DL (ref 3.2–4.8)
ALBUMIN/GLOB SERPL: 1.5 {RATIO} (ref 1–2)
ALP LIVER SERPL-CCNC: 43 U/L
ALT SERPL-CCNC: 54 U/L
ANION GAP SERPL CALC-SCNC: 9 MMOL/L (ref 0–18)
AST SERPL-CCNC: 62 U/L (ref ?–34)
ATRIAL RATE: 89 BPM
BASOPHILS # BLD AUTO: 0.02 X10(3) UL (ref 0–0.2)
BASOPHILS NFR BLD AUTO: 0.4 %
BILIRUB SERPL-MCNC: 0.2 MG/DL (ref 0.2–1.1)
BUN BLD-MCNC: 9 MG/DL (ref 9–23)
BUN/CREAT SERPL: 12.3 (ref 10–20)
CALCIUM BLD-MCNC: 9.2 MG/DL (ref 8.7–10.4)
CHLORIDE SERPL-SCNC: 105 MMOL/L (ref 98–112)
CO2 SERPL-SCNC: 26 MMOL/L (ref 21–32)
CREAT BLD-MCNC: 0.73 MG/DL
DEPRECATED RDW RBC AUTO: 46.7 FL (ref 35.1–46.3)
EGFRCR SERPLBLD CKD-EPI 2021: 91 ML/MIN/1.73M2 (ref 60–?)
EOSINOPHIL # BLD AUTO: 0.02 X10(3) UL (ref 0–0.7)
EOSINOPHIL NFR BLD AUTO: 0.4 %
ERYTHROCYTE [DISTWIDTH] IN BLOOD BY AUTOMATED COUNT: 15.3 % (ref 11–15)
GLOBULIN PLAS-MCNC: 3 G/DL (ref 2–3.5)
GLUCOSE BLD-MCNC: 132 MG/DL (ref 70–99)
GLUCOSE BLDC GLUCOMTR-MCNC: 160 MG/DL (ref 70–99)
GLUCOSE BLDC GLUCOMTR-MCNC: 305 MG/DL (ref 70–99)
GLUCOSE BLDC GLUCOMTR-MCNC: 313 MG/DL (ref 70–99)
HCT VFR BLD AUTO: 38.6 %
HGB BLD-MCNC: 12.1 G/DL
IMM GRANULOCYTES # BLD AUTO: 0.02 X10(3) UL (ref 0–1)
IMM GRANULOCYTES NFR BLD: 0.4 %
LYMPHOCYTES # BLD AUTO: 1.36 X10(3) UL (ref 1–4)
LYMPHOCYTES NFR BLD AUTO: 29.9 %
MAGNESIUM SERPL-MCNC: 1.8 MG/DL (ref 1.6–2.6)
MCH RBC QN AUTO: 26.4 PG (ref 26–34)
MCHC RBC AUTO-ENTMCNC: 31.3 G/DL (ref 31–37)
MCV RBC AUTO: 84.1 FL
MONOCYTES # BLD AUTO: 0.43 X10(3) UL (ref 0.1–1)
MONOCYTES NFR BLD AUTO: 9.5 %
NEUTROPHILS # BLD AUTO: 2.7 X10 (3) UL (ref 1.5–7.7)
NEUTROPHILS # BLD AUTO: 2.7 X10(3) UL (ref 1.5–7.7)
NEUTROPHILS NFR BLD AUTO: 59.4 %
NT-PROBNP SERPL-MCNC: 123 PG/ML (ref ?–125)
OSMOLALITY SERPL CALC.SUM OF ELEC: 291 MOSM/KG (ref 275–295)
P AXIS: 63 DEGREES
P-R INTERVAL: 146 MS
PLATELET # BLD AUTO: 245 10(3)UL (ref 150–450)
POTASSIUM SERPL-SCNC: 3.9 MMOL/L (ref 3.5–5.1)
PROT SERPL-MCNC: 7.4 G/DL (ref 5.7–8.2)
Q-T INTERVAL: 378 MS
QRS DURATION: 86 MS
QTC CALCULATION (BEZET): 459 MS
R AXIS: 42 DEGREES
RBC # BLD AUTO: 4.59 X10(6)UL
SODIUM SERPL-SCNC: 140 MMOL/L (ref 136–145)
T AXIS: 68 DEGREES
VENTRICULAR RATE: 89 BPM
WBC # BLD AUTO: 4.6 X10(3) UL (ref 4–11)

## 2025-01-02 PROCEDURE — 71045 X-RAY EXAM CHEST 1 VIEW: CPT | Performed by: EMERGENCY MEDICINE

## 2025-01-02 PROCEDURE — 99223 1ST HOSP IP/OBS HIGH 75: CPT | Performed by: INTERNAL MEDICINE

## 2025-01-02 PROCEDURE — 99223 1ST HOSP IP/OBS HIGH 75: CPT | Performed by: HOSPITALIST

## 2025-01-02 PROCEDURE — 5A09357 ASSISTANCE WITH RESPIRATORY VENTILATION, LESS THAN 24 CONSECUTIVE HOURS, CONTINUOUS POSITIVE AIRWAY PRESSURE: ICD-10-PCS | Performed by: HOSPITALIST

## 2025-01-02 RX ORDER — NICOTINE POLACRILEX 4 MG
15 LOZENGE BUCCAL
Status: DISCONTINUED | OUTPATIENT
Start: 2025-01-02 | End: 2025-01-06

## 2025-01-02 RX ORDER — TEMAZEPAM 7.5 MG/1
15 CAPSULE ORAL NIGHTLY PRN
Status: DISCONTINUED | OUTPATIENT
Start: 2025-01-02 | End: 2025-01-06

## 2025-01-02 RX ORDER — BENZONATATE 200 MG/1
200 CAPSULE ORAL 3 TIMES DAILY PRN
Status: DISCONTINUED | OUTPATIENT
Start: 2025-01-02 | End: 2025-01-06

## 2025-01-02 RX ORDER — OSELTAMIVIR PHOSPHATE 75 MG/1
75 CAPSULE ORAL 2 TIMES DAILY
Status: DISCONTINUED | OUTPATIENT
Start: 2025-01-02 | End: 2025-01-06

## 2025-01-02 RX ORDER — DEXTROSE MONOHYDRATE 25 G/50ML
50 INJECTION, SOLUTION INTRAVENOUS
Status: DISCONTINUED | OUTPATIENT
Start: 2025-01-02 | End: 2025-01-06

## 2025-01-02 RX ORDER — ACETAMINOPHEN 500 MG
1000 TABLET ORAL EVERY 6 HOURS PRN
COMMUNITY

## 2025-01-02 RX ORDER — METHYLPREDNISOLONE SODIUM SUCCINATE 40 MG/ML
40 INJECTION INTRAMUSCULAR; INTRAVENOUS EVERY 6 HOURS
Status: DISCONTINUED | OUTPATIENT
Start: 2025-01-02 | End: 2025-01-03

## 2025-01-02 RX ORDER — ONDANSETRON 2 MG/ML
4 INJECTION INTRAMUSCULAR; INTRAVENOUS EVERY 6 HOURS PRN
Status: DISCONTINUED | OUTPATIENT
Start: 2025-01-02 | End: 2025-01-06

## 2025-01-02 RX ORDER — ALBUTEROL SULFATE 0.83 MG/ML
2.5 SOLUTION RESPIRATORY (INHALATION)
Status: DISCONTINUED | OUTPATIENT
Start: 2025-01-02 | End: 2025-01-06

## 2025-01-02 RX ORDER — ALBUTEROL SULFATE 0.83 MG/ML
2.5 SOLUTION RESPIRATORY (INHALATION) ONCE
Status: COMPLETED | OUTPATIENT
Start: 2025-01-02 | End: 2025-01-02

## 2025-01-02 RX ORDER — ENOXAPARIN SODIUM 100 MG/ML
0.5 INJECTION SUBCUTANEOUS 2 TIMES DAILY
Status: DISCONTINUED | OUTPATIENT
Start: 2025-01-02 | End: 2025-01-04

## 2025-01-02 RX ORDER — ALBUTEROL SULFATE 90 UG/1
2 INHALANT RESPIRATORY (INHALATION) EVERY 6 HOURS PRN
COMMUNITY
End: 2025-01-10

## 2025-01-02 RX ORDER — IPRATROPIUM BROMIDE AND ALBUTEROL SULFATE 2.5; .5 MG/3ML; MG/3ML
3 SOLUTION RESPIRATORY (INHALATION) EVERY 4 HOURS PRN
Status: DISCONTINUED | OUTPATIENT
Start: 2025-01-02 | End: 2025-01-06

## 2025-01-02 RX ORDER — MAGNESIUM OXIDE 400 MG/1
400 TABLET ORAL ONCE
Status: COMPLETED | OUTPATIENT
Start: 2025-01-02 | End: 2025-01-02

## 2025-01-02 RX ORDER — ACETAMINOPHEN 500 MG
500 TABLET ORAL EVERY 4 HOURS PRN
Status: DISCONTINUED | OUTPATIENT
Start: 2025-01-02 | End: 2025-01-06

## 2025-01-02 RX ORDER — METHYLPREDNISOLONE SODIUM SUCCINATE 125 MG/2ML
125 INJECTION INTRAMUSCULAR; INTRAVENOUS ONCE
Status: COMPLETED | OUTPATIENT
Start: 2025-01-02 | End: 2025-01-02

## 2025-01-02 RX ORDER — NICOTINE POLACRILEX 4 MG
30 LOZENGE BUCCAL
Status: DISCONTINUED | OUTPATIENT
Start: 2025-01-02 | End: 2025-01-06

## 2025-01-02 RX ORDER — METOCLOPRAMIDE HYDROCHLORIDE 5 MG/ML
5 INJECTION INTRAMUSCULAR; INTRAVENOUS EVERY 8 HOURS PRN
Status: DISCONTINUED | OUTPATIENT
Start: 2025-01-02 | End: 2025-01-06

## 2025-01-02 RX ORDER — ALBUTEROL SULFATE 5 MG/ML
10 SOLUTION RESPIRATORY (INHALATION) ONCE
Status: COMPLETED | OUTPATIENT
Start: 2025-01-02 | End: 2025-01-02

## 2025-01-02 NOTE — ED PROVIDER NOTES
Patient Seen in: [facility] Emergency Department      History     Chief Complaint   Patient presents with    Difficulty Breathing     Stated Complaint: KEN    Subjective:   HPI      64-year-old female presents for evaluation for shortness of breath and cough.  Son reports that she developed flulike symptoms on Sunday and was seen at immediate care and tested positive for influenza.  Patient reports over the past couple of days she has had worsening cough and shortness of breath with fatigue.  She continues to have headache and fevers.  She has had some bodyaches as well.  She reports diarrhea but denies any vomiting.    Objective:     Past Medical History:    Anxiety state    Arthritis    Asthma (HCC)    Chronic pulmonary embolism without acute cor pulmonale (HCC)    Class 2 obesity due to excess calories without serious comorbidity with body mass index (BMI) of 39.0 to 39.9 in adult    Depression    Diabetes (HCC)    Disorder of thyroid    Esophageal reflux    Fibromyalgia    Hearing impairment    left ear    High blood pressure    High cholesterol    Hyperlipidemia    Hyperthyroidism    Insulin resistance    Morbid obesity with BMI of 40.0-44.9, adult (HCC)    Osteoarthritis    Pneumonia due to organism    Prediabetes    Pulmonary embolism (HCC)    Sleep apnea    Visual impairment    wears glasses              Past Surgical History:   Procedure Laterality Date    Cholecystectomy      Colonoscopy N/A 6/17/2024    Procedure: COLONOSCOPY;  Surgeon: Marcela Juan MD;  Location: [facility] ENDOSCOPY    Needle biopsy left Left     [place]    Other surgical history      gall stones                Social History     Socioeconomic History    Marital status:    Tobacco Use    Smoking status: Never    Smokeless tobacco: Never   Vaping Use    Vaping status: Never Used   Substance and Sexual Activity    Alcohol use: No    Drug use: No   Other Topics Concern    Caffeine Concern No                   Physical Exam     ED Triage Vitals [01/02/25 0748]   /78   Pulse 88   Resp 18   Temp 98.5 °F (36.9 °C)   Temp src Temporal   SpO2 92 %   O2 Device None (Room air)       Current Vitals:   Vital Signs  BP: 122/59  Pulse: 93  Resp: 19  Temp: 98.5 °F (36.9 °C)  Temp src: Temporal  MAP (mmHg): 78    Oxygen Therapy  SpO2: 96 %  O2 Device: Nasal cannula  O2 Flow Rate (L/min): 2 L/min        Physical Exam  Vitals and nursing note reviewed.   Constitutional:       Appearance: Normal appearance.   HENT:      Head: Normocephalic and atraumatic.   Cardiovascular:      Rate and Rhythm: Normal rate and regular rhythm.      Pulses: Normal pulses.           Radial pulses are 2+ on the right side and 2+ on the left side.      Heart sounds: Normal heart sounds.   Pulmonary:      Effort: Pulmonary effort is normal. Tachypnea present.      Breath sounds: Wheezing present.   Musculoskeletal:         General: Normal range of motion.      Cervical back: Normal range of motion.      Right lower leg: No edema.      Left lower leg: No edema.   Skin:     General: Skin is warm and dry.   Neurological:      General: No focal deficit present.      Mental Status: She is alert.             ED Course     Labs Reviewed   CBC WITH DIFFERENTIAL WITH PLATELET - Abnormal; Notable for the following components:       Result Value    RDW-SD 46.7 (*)     RDW 15.3 (*)     All other components within normal limits   COMP METABOLIC PANEL (14) - Abnormal; Notable for the following components:    Glucose 132 (*)     ALT 54 (*)     AST 62 (*)     Alkaline Phosphatase 43 (*)     All other components within normal limits   MAGNESIUM - Normal   PRO BETA NATRIURETIC PEPTIDE - Normal     EKG    Rate, intervals and axes as noted on EKG Report.  Rate: 89  Rhythm: Sinus Rhythm  Reading: no stemi, interpreted by myself.            ED Course as of 01/02/25 1212  ------------------------------------------------------------  Time: 01/02 1003  Value: XR CHEST  AP PORTABLE  (CPT=71045)  Comment: Per my independent interpretation, patient's CXR demonstrates no pneumothorax.    ------------------------------------------------------------  Time: 01/02 1142  Comment: Patient still wheezing and hypoxic after hour long neb.              [facility]          Admission disposition: 1/2/2025 12:07 PM           Medical Decision Making  Differential diagnosis includes but is not limited to pneumonia, CHF, COPD exacerbation, etc.    CBC chemistry were unremarkable.  BNP was normal, she does not appear to be volume overloaded.  I do not suspect CHF.  Patient was with significant wheezing on exam.  She was given steroids, albuterol and ipratropium.  Patient was still hypoxic.  She is positive for influenza.  Patient will be admitted for COPD exacerbation in the setting of influenza.    Rhythm Strip: Rate 100 sinus rhythm as interpreted by myself. The cardiac monitor was ordered secondary to the patient's hypoxia.     Complicating factors: The patient  has a past medical history of Anxiety state, Arthritis, Asthma (HCC), Chronic pulmonary embolism without acute cor pulmonale (HCC) (10/28/2018), Class 2 obesity due to excess calories without serious comorbidity with body mass index (BMI) of 39.0 to 39.9 in adult (05/07/2018), Depression, Diabetes (HCC), Disorder of thyroid, Esophageal reflux, Fibromyalgia, Hearing impairment, High blood pressure, High cholesterol, Hyperlipidemia, Hyperthyroidism, Insulin resistance, Morbid obesity with BMI of 40.0-44.9, adult (HCC), Osteoarthritis, Pneumonia due to organism, Prediabetes, Pulmonary embolism (HCC), Sleep apnea, and Visual impairment. and  has a past surgical history that includes other surgical history; needle biopsy left (Left); cholecystectomy; and colonoscopy (N/A, 6/17/2024). that contribute to the medical complexity of this ED evaluation.     Medical Record Review: I personally reviewed available prior medical records for any recent pertinent  discharge summaries, testing, and procedures, and reviewed those reports.        Problems Addressed:  Acute exacerbation of chronic obstructive pulmonary disease (COPD) (HCC): acute illness or injury with systemic symptoms  Influenza A: acute illness or injury with systemic symptoms    Amount and/or Complexity of Data Reviewed  External Data Reviewed: labs.     Details: Patients viral panel from 12/31/24 reviewed, positive for influenza A  Labs: ordered. Decision-making details documented in ED Course.  Radiology: ordered and independent interpretation performed. Decision-making details documented in ED Course.  ECG/medicine tests: ordered and independent interpretation performed. Decision-making details documented in ED Course.  Discussion of management or test interpretation with external provider(s): Discussed with Dr. Sanchez who accepts admission, would like pulmonary consult.  Dr. Becker with pulmonology is on consult.    Risk  Decision regarding hospitalization.        Disposition and Plan     Clinical Impression:  1. Acute exacerbation of chronic obstructive pulmonary disease (COPD) (HCC)    2. Influenza A         Disposition:  Admit  1/2/2025 12:07 pm    Follow-up:  No follow-up provider specified.        Medications Prescribed:  Current Discharge Medication List              Supplementary Documentation:         Hospital Problems       Present on Admission  Date Reviewed: 12/31/2024            ICD-10-CM Noted POA    * (Principal) Acute exacerbation of chronic obstructive pulmonary disease (COPD) (HCC) J44.1 1/2/2025 Unknown

## 2025-01-02 NOTE — RESPIRATORY THERAPY NOTE
GÉNESIS ASSESSMENT:    Pt does have a previous diagnosis of ÉGNESIS. Pt does not routinely use a CPAP device at home.   CPAP INITIATION:    Pt to be placed on CPAP: yes  Pt refused: no  CPAP settings: Auto Pap 5 min cm H2O. 20 max cm H2O. Mask Option:  Full face    Long time ago patient use the cpap mask, wants to use tonight.

## 2025-01-02 NOTE — ED INITIAL ASSESSMENT (HPI)
Patient was dx with flu on 12/31/2024, states she began having brian and cough yesterday, feeling worse

## 2025-01-02 NOTE — H&P
[facility]    PATIENT'S NAME: AIDEN HAAS   ATTENDING PHYSICIAN: Xiao Sanchez MD   PATIENT ACCOUNT#:   [id number]    LOCATION:  [address] [place] [address]  MEDICAL RECORD #:   [id number]       YOB: 1960  ADMISSION DATE:       01/02/2025    HISTORY AND PHYSICAL EXAMINATION    CHIEF COMPLAINT:  Influenza A bronchitis with asthma exacerbation.    HISTORY OF PRESENT ILLNESS:  The patient is a 64-year-old  female with a history of asthma.  Had upper respiratory tract infection symptoms and tested positive for influenza A on December 31, three days ago.  Today presented to the emergency department for evaluation of progressive wheezing and dyspnea without exertion.  CBC and chemistry were unremarkable.  Magnesium 1.8.  Chest x-ray showed no acute findings.  The patient was initiated on Solu-Medrol nebulizer treatments, and she will be admitted to the hospital for further management.  Upon arrival to the emergency room, pulse ox was 88% on room air.    PAST MEDICAL HISTORY:  Morbid obesity, obstructive sleep apnea, asthma, hypothyroidism, hyperlipidemia, gastroesophageal reflux disease, fibromyalgia, diabetes mellitus type 2, osteoarthritis, depression, anxiety.    PAST SURGICAL HISTORY:  Cholecystectomy, left breast biopsy.    MEDICATIONS:  Please see medication reconciliation list.     ALLERGIES:  No known drug allergies.    FAMILY HISTORY:  Mother had hypertension, cerebrovascular accident, and diabetes mellitus type 2.  Father had hypertension and Parkinson's disease.    SOCIAL HISTORY:  No tobacco, alcohol, or drug use.  Independent for basic activities of daily living.     REVIEW OF SYSTEMS:  Initially body aches, low-grade fever with progressive cough and then progressive wheezing with progressive dyspnea and difficulty with her basic activities of daily living.  Other 12-point review of systems is negative.        PHYSICAL EXAMINATION:    GENERAL:  Alert and oriented to time, place, and  person.  Mild to moderate distress.  Visibly dyspneic.  VITAL SIGNS:  Temperature 98.5, pulse 93, respiratory rate 19, blood pressure 122/59, pulse ox 88% on room air, 96% on 2L nasal cannula oxygen.  HEENT:  Atraumatic.  Oropharynx clear.  Dry mucous membranes.  Ears and nose normal.  Eyes:  Anicteric sclerae.  NECK:  Supple.  No lymphadenopathy.  Trachea midline.  Full range of motion.  LUNGS:  Bilateral expiratory wheezing with increased respiratory effort.  HEART:  Regular rate and rhythm.  S1 and S2 auscultated.    ABDOMEN:  Soft and obese.  Positive bowel sounds.  No distention.  No tenderness.  EXTREMITIES:  No peripheral edema, clubbing, or cyanosis.  NEUROLOGIC:  Motor and sensory intact.       ASSESSMENT:    1.   Influenza A bronchitis with asthma exacerbation.  2.   Hypoxic respiratory failure.  3.   Morbid obesity.  4.   Diabetes mellitus type 2.    PLAN:  The patient will be admitted to general medical floor.  IV steroids.  Solu-Medrol nebulizer treatments, cough suppressants.  DVT prophylaxis.  Pulmonary consult.  Oxygen support.  Monitor respiratory and hemodynamic status.  Start her on Tamiflu.  Further recommendations to follow.      Dictated By Xiao Sanchez MD  d: 01/02/2025 12:29:07  t: 01/02/2025 14:21:46  Job [id number]  FB/

## 2025-01-02 NOTE — CONSULTS
[facility]  part of [facility]    Report of Consultation    Lena Macedo Patient Status:  Inpatient    3/10/1960 MRN [id number]   Location [facility] 5SW/SE Attending Xiao Sanchez MD   Hosp Day # 0 PCP Margaret Correa MD     Date of Admission:  2025  Date of Consult: 25    Reason for Consultation:   Consults  Acute asthma exacerbation  Influenza A    History provided by:patient  HPI:     Chief Complaint   Patient presents with    Difficulty Breathing     HPI    64-year-old female with history of asthma, anxiety, fibromyalgia, hypothyroidism, HL, GÉNESIS  Patient presented with fever and severe cough and chest tightness and wheezing and dyspnea  Tested positive for influenza A  Nausea no diarrhea or vomiting  Generalized fatigue and weakness  No lower extremity edema or pain or calf tenderness  Mild sore throat with no neck pain with mild headache  No focal weakness or numbness        History     Past Medical History:    Anxiety state    Arthritis    Asthma (HCC)    Chronic pulmonary embolism without acute cor pulmonale (HCC)    Class 2 obesity due to excess calories without serious comorbidity with body mass index (BMI) of 39.0 to 39.9 in adult    Depression    Diabetes (HCC)    Disorder of thyroid    Esophageal reflux    Fibromyalgia    Hearing impairment    left ear    High blood pressure    High cholesterol    Hyperlipidemia    Hyperthyroidism    Insulin resistance    Morbid obesity with BMI of 40.0-44.9, adult (HCC)    Osteoarthritis    Pneumonia due to organism    Prediabetes    Pulmonary embolism (HCC)    Sleep apnea    Visual impairment    wears glasses     Past Surgical History:   Procedure Laterality Date    Cholecystectomy      Colonoscopy N/A 2024    Procedure: COLONOSCOPY;  Surgeon: Marcela Juan MD;  Location: [facility] ENDOSCOPY    Needle biopsy left Left     [place]    Other surgical history      gall stones     Family History   Problem Relation  Age of Onset    Stroke Mother         CVA    Diabetes Mother     Hypertension Father     Neurological Disorder Father         parkinson's disesae    Arthritis Father     Lipids Father         hyperlipidemia    Cancer Neg     Heart Disease Neg         CAD    Breast Cancer Neg     Ovarian Cancer Neg      Social History:  Social History     Socioeconomic History    Marital status:    Tobacco Use    Smoking status: Never    Smokeless tobacco: Never   Vaping Use    Vaping status: Never Used   Substance and Sexual Activity    Alcohol use: No    Drug use: No   Other Topics Concern    Caffeine Concern No     Social Drivers of Health     Food Insecurity: No Food Insecurity (1/2/2025)    Food Insecurity     Food Insecurity: Never true   Transportation Needs: No Transportation Needs (1/2/2025)    Transportation Needs     Lack of Transportation: No   Housing Stability: Low Risk  (1/2/2025)    Housing Stability     Housing Instability: No     Allergies/Medications:   Allergies: Allergies[1]  Medications Prior to Admission   Medication Sig    albuterol 108 (90 Base) MCG/ACT Inhalation Aero Soln Inhale 2 puffs into the lungs every 6 (six) hours as needed for Wheezing or Shortness of Breath.    acetaminophen 500 MG Oral Tab Take 2 tablets (1,000 mg total) by mouth every 6 (six) hours as needed for Pain.    oseltamivir 75 MG Oral Cap Take 1 capsule (75 mg total) by mouth 2 (two) times daily for 5 days.    semaglutide (OZEMPIC, 0.25 OR 0.5 MG/DOSE,) 2 MG/3ML Subcutaneous Solution Pen-injector INYECTE 0.5 MG INTO THE SKIN GINNY VEZ POR SEMANA    hydrOXYzine Pamoate 25 MG Oral Cap Take 1 capsule (25 mg total) by mouth 3 (three) times daily as needed for Itching.    propranolol 40 MG Oral Tab Take 1 tablet (40 mg total) by mouth 2 (two) times daily.    aspirin 81 MG Oral Tab EC Take 1 tablet (81 mg total) by mouth daily.    alendronate (FOSAMAX) 70 MG Oral Tab Take 1 tablet (70 mg total) by mouth every 7 days.    levothyroxine  (SYNTHROID) 137 MCG Oral Tab Take 137 mcg by mouth before breakfast.    pantoprazole 40 MG Oral Tab EC Take 1 tablet (40 mg total) by mouth before breakfast.    Fenofibrate 160 MG Oral Tab Take 1 tablet (160 mg total) by mouth daily.    ergocalciferol 1.25 MG (46451 UT) Oral Cap TOME GINNY CAPSULA POR VIA ORAL GINNY VES POR SEMANA    sertraline 50 MG Oral Tab Take 1.5 tablets (75 mg total) by mouth daily. (Patient taking differently: Take 2 tablets (100 mg total) by mouth daily.)    simvastatin 40 MG Oral Tab Take 1 tablet (40 mg total) by mouth nightly.    Incontinence Supply Disposable (COMFORT PROTECT ADULT DIAPER/L) Does not apply Misc 1 each in the morning, at noon, and at bedtime.       Review of Systems:     Constitutional:  Positive for chills, fatigue and fever.   HENT:  Positive for congestion.    Respiratory:  Positive for cough, chest tightness, shortness of breath and wheezing.    Cardiovascular:  Negative for palpitations and leg swelling.   Gastrointestinal:  Negative for abdominal pain and abdominal distention.   Neurological:  Negative for seizures and weakness.   Psychiatric/Behavioral:  Negative for agitation.        Physical Exam:   Vital Signs:   weight is 223 lb 12.3 oz (101.5 kg). Her oral temperature is 98.4 °F (36.9 °C). Her blood pressure is 132/63 and her pulse is 84. Her respiration is 18 and oxygen saturation is 96%.   Physical Exam  Constitutional:       General: She is not in acute distress.     Appearance: She is obese. She is ill-appearing.   HENT:      Head: Atraumatic.      Nose: Congestion present.      Mouth/Throat:      Mouth: Mucous membranes are moist.   Eyes:      General: No scleral icterus.  Cardiovascular:      Rate and Rhythm: Normal rate.      Heart sounds:      No gallop.   Pulmonary:      Effort: No respiratory distress.      Breath sounds: No stridor. Wheezing present. No rhonchi or rales.   Abdominal:      General: Abdomen is flat. Bowel sounds are normal. There is no  distension.      Palpations: Abdomen is soft.      Tenderness: There is no guarding.   Musculoskeletal:      Cervical back: Normal range of motion.      Right lower leg: No edema.      Left lower leg: No edema.   Skin:     General: Skin is dry.   Neurological:      General: No focal deficit present.      Mental Status: She is oriented to person, place, and time.         Results:     Lab Results   Component Value Date    WBC 4.6 01/02/2025    HGB 12.1 01/02/2025    HCT 38.6 01/02/2025    .0 01/02/2025    CREATSERUM 0.73 01/02/2025    BUN 9 01/02/2025     01/02/2025    K 3.9 01/02/2025     01/02/2025    CO2 26.0 01/02/2025     (H) 01/02/2025    CA 9.2 01/02/2025    ALB 4.4 01/02/2025    ALKPHO 43 (L) 01/02/2025    BILT 0.2 01/02/2025    TP 7.4 01/02/2025    AST 62 (H) 01/02/2025    ALT 54 (H) 01/02/2025    PTT 25.3 10/28/2018    INR 1.1 10/28/2018    T4F 1.1 09/17/2020    TSH 1.036 11/01/2024    DDIMER 0.89 (H) 10/28/2018    CRP 0.6 04/07/2014    MG 1.8 01/02/2025    TROP <0.045 04/28/2019    TROPHS 3 07/05/2022    B12 1,486 (H) 07/18/2022     XR CHEST AP PORTABLE  (CPT=71045)    Result Date: 1/2/2025  CONCLUSION: Cardiac enlargement with secondary signs of slight fluid overload.    Dictated by (CST): Pedro Pride MD on 1/02/2025 at 9:46 AM     Finalized by (CST): Pedro Pride MD on 1/02/2025 at 9:47 AM         EKG 12 Lead    Result Date: 1/2/2025  Normal sinus rhythm Nonspecific ST and T wave abnormality Abnormal ECG When compared with ECG of 25-MAY-2024 09:04, Nonspecific T wave abnormality, improved in Lateral leads Confirmed by STEPHEN PRADHAN ELMER ([id number]) on 1/2/2025 11:11:02 AM     Impression:       1-acute asthma exacerbation secondary to influenza A with acute hypoxemic respiratory failure  Chest x-ray mild nonspecific interstitial infiltrate  No leukocytosis    Plan;  Tamiflu  Supportive care  Taper steroid  Bronchial hygiene and nebulizers with bronchodilator EF  O2  therapy    2-GÉNESIS required CPAP 8 CWP  CPAP nightly when tolerate     3-history of anxiety and fibromyalgia    4-DVT prophylaxis  Lovenox subcu                      Alysha Lopez MD  1/2/2025         [1]   Allergies  Allergen Reactions    Advair Hfa OTHER (SEE COMMENTS)     Tingling sensation in throat

## 2025-01-02 NOTE — PLAN OF CARE
Patient admitted to unit and assessments completed. Son at bedside and provided updates on plan of care. Assisted with care needs. Isolation precautions maintained.     Problem: Patient Centered Care  Goal: Patient preferences are identified and integrated in the patient's plan of care  Description: Interventions:  - What would you like us to know as we care for you? From home with son/ ( in hospital on 4th floor)  - Provide timely, complete, and accurate information to patient/family  - Incorporate patient and family knowledge, values, beliefs, and cultural backgrounds into the planning and delivery of care  - Encourage patient/family to participate in care and decision-making at the level they choose  - Honor patient and family perspectives and choices  Outcome: Progressing

## 2025-01-02 NOTE — ED QUICK NOTES
Orders for admission, patient is aware of plan and ready to go upstairs. Any questions, please call ED RN Latoya at extension [phone].     Patient Covid vaccination status: Fully vaccinated     COVID Test Ordered in ED: None    COVID Suspicion at Admission: N/A    Running Infusions:  None    Mental Status/LOC at time of transport: A&Ox4    Other pertinent information:   CIWA score: N/A   NIH score:  N/A

## 2025-01-03 LAB
ANION GAP SERPL CALC-SCNC: 9 MMOL/L (ref 0–18)
BASOPHILS # BLD AUTO: 0.01 X10(3) UL (ref 0–0.2)
BASOPHILS NFR BLD AUTO: 0.2 %
BUN BLD-MCNC: 15 MG/DL (ref 9–23)
BUN/CREAT SERPL: 20.5 (ref 10–20)
CALCIUM BLD-MCNC: 9.6 MG/DL (ref 8.7–10.4)
CHLORIDE SERPL-SCNC: 104 MMOL/L (ref 98–112)
CO2 SERPL-SCNC: 26 MMOL/L (ref 21–32)
CREAT BLD-MCNC: 0.73 MG/DL
DEPRECATED RDW RBC AUTO: 45.2 FL (ref 35.1–46.3)
EGFRCR SERPLBLD CKD-EPI 2021: 91 ML/MIN/1.73M2 (ref 60–?)
EOSINOPHIL # BLD AUTO: 0 X10(3) UL (ref 0–0.7)
EOSINOPHIL NFR BLD AUTO: 0 %
ERYTHROCYTE [DISTWIDTH] IN BLOOD BY AUTOMATED COUNT: 14.7 % (ref 11–15)
GLUCOSE BLD-MCNC: 257 MG/DL (ref 70–99)
GLUCOSE BLDC GLUCOMTR-MCNC: 189 MG/DL (ref 70–99)
GLUCOSE BLDC GLUCOMTR-MCNC: 233 MG/DL (ref 70–99)
GLUCOSE BLDC GLUCOMTR-MCNC: 252 MG/DL (ref 70–99)
GLUCOSE BLDC GLUCOMTR-MCNC: 264 MG/DL (ref 70–99)
HCT VFR BLD AUTO: 37.9 %
HGB BLD-MCNC: 11.9 G/DL
IMM GRANULOCYTES # BLD AUTO: 0.02 X10(3) UL (ref 0–1)
IMM GRANULOCYTES NFR BLD: 0.4 %
LYMPHOCYTES # BLD AUTO: 0.71 X10(3) UL (ref 1–4)
LYMPHOCYTES NFR BLD AUTO: 14.7 %
MAGNESIUM SERPL-MCNC: 1.9 MG/DL (ref 1.6–2.6)
MCH RBC QN AUTO: 26.3 PG (ref 26–34)
MCHC RBC AUTO-ENTMCNC: 31.4 G/DL (ref 31–37)
MCV RBC AUTO: 83.7 FL
MONOCYTES # BLD AUTO: 0.32 X10(3) UL (ref 0.1–1)
MONOCYTES NFR BLD AUTO: 6.6 %
NEUTROPHILS # BLD AUTO: 3.76 X10 (3) UL (ref 1.5–7.7)
NEUTROPHILS # BLD AUTO: 3.76 X10(3) UL (ref 1.5–7.7)
NEUTROPHILS NFR BLD AUTO: 78.1 %
OSMOLALITY SERPL CALC.SUM OF ELEC: 298 MOSM/KG (ref 275–295)
PLATELET # BLD AUTO: 272 10(3)UL (ref 150–450)
POTASSIUM SERPL-SCNC: 3.8 MMOL/L (ref 3.5–5.1)
RBC # BLD AUTO: 4.53 X10(6)UL
SODIUM SERPL-SCNC: 139 MMOL/L (ref 136–145)
WBC # BLD AUTO: 4.8 X10(3) UL (ref 4–11)

## 2025-01-03 PROCEDURE — 99233 SBSQ HOSP IP/OBS HIGH 50: CPT | Performed by: INTERNAL MEDICINE

## 2025-01-03 PROCEDURE — 99233 SBSQ HOSP IP/OBS HIGH 50: CPT | Performed by: HOSPITALIST

## 2025-01-03 RX ORDER — METHYLPREDNISOLONE SODIUM SUCCINATE 40 MG/ML
40 INJECTION INTRAMUSCULAR; INTRAVENOUS EVERY 12 HOURS
Status: DISCONTINUED | OUTPATIENT
Start: 2025-01-03 | End: 2025-01-05

## 2025-01-03 RX ORDER — PRAVASTATIN SODIUM 10 MG
10 TABLET ORAL NIGHTLY
Status: DISCONTINUED | OUTPATIENT
Start: 2025-01-03 | End: 2025-01-06

## 2025-01-03 RX ORDER — FENOFIBRATE 67 MG/1
67 CAPSULE ORAL
Status: DISCONTINUED | OUTPATIENT
Start: 2025-01-03 | End: 2025-01-06

## 2025-01-03 RX ORDER — LEVOTHYROXINE SODIUM 137 UG/1
137 TABLET ORAL
Status: DISCONTINUED | OUTPATIENT
Start: 2025-01-03 | End: 2025-01-06

## 2025-01-03 RX ORDER — PANTOPRAZOLE SODIUM 40 MG/1
40 TABLET, DELAYED RELEASE ORAL
Status: DISCONTINUED | OUTPATIENT
Start: 2025-01-03 | End: 2025-01-06

## 2025-01-03 RX ORDER — PROPRANOLOL HYDROCHLORIDE 40 MG/1
40 TABLET ORAL 2 TIMES DAILY
Status: DISCONTINUED | OUTPATIENT
Start: 2025-01-03 | End: 2025-01-06

## 2025-01-03 RX ORDER — HYDROXYZINE HYDROCHLORIDE 10 MG/1
10 TABLET, FILM COATED ORAL 3 TIMES DAILY PRN
Status: DISCONTINUED | OUTPATIENT
Start: 2025-01-03 | End: 2025-01-06

## 2025-01-03 NOTE — PAYOR COMM NOTE
--------------  ADMISSION REVIEW     Payor: [employer] MEDICARE ADV PPO  Subscriber #:  [id number]  Authorization Number: [id number]    Admit date: 1/2/25  Admit time:  2:09 PM       REVIEW DOCUMENTATION:     ED Provider Notes        ED Provider Notes signed by Veto Morse MD at 1/2/2025 12:12 PM       Author: Veto Morse MD Service: -- Author Type: Physician    Filed: 1/2/2025 12:12 PM Date of Service: 1/2/2025 10:11 AM Status: Signed    : Veto Morse MD (Physician)           Patient Seen in: [facility] Emergency Department      History     Chief Complaint   Patient presents with    Difficulty Breathing     Stated Complaint: KEN    Subjective:   HPI      64-year-old female presents for evaluation for shortness of breath and cough.  Son reports that she developed flulike symptoms on Sunday and was seen at immediate care and tested positive for influenza.  Patient reports over the past couple of days she has had worsening cough and shortness of breath with fatigue.  She continues to have headache and fevers.  She has had some bodyaches as well.  She reports diarrhea but denies any vomiting.    Objective:     Past Medical History:    Anxiety state    Arthritis    Asthma (HCC)    Chronic pulmonary embolism without acute cor pulmonale (HCC)    Class 2 obesity due to excess calories without serious comorbidity with body mass index (BMI) of 39.0 to 39.9 in adult    Depression    Diabetes (HCC)    Disorder of thyroid    Esophageal reflux    Fibromyalgia    Hearing impairment    left ear    High blood pressure    High cholesterol    Hyperlipidemia    Hyperthyroidism    Insulin resistance    Morbid obesity with BMI of 40.0-44.9, adult (HCC)    Osteoarthritis    Pneumonia due to organism    Prediabetes    Pulmonary embolism (HCC)    Sleep apnea    Visual impairment    wears glasses              Past Surgical History:   Procedure Laterality Date    Cholecystectomy       Colonoscopy N/A 6/17/2024    Procedure: COLONOSCOPY;  Surgeon: Marcela Juan MD;  Location: [facility] ENDOSCOPY    Needle biopsy left Left     [place]    Other surgical history      gall stones                Social History     Socioeconomic History    Marital status:    Tobacco Use    Smoking status: Never    Smokeless tobacco: Never   Vaping Use    Vaping status: Never Used   Substance and Sexual Activity    Alcohol use: No    Drug use: No   Other Topics Concern    Caffeine Concern No                  Physical Exam     ED Triage Vitals [01/02/25 0748]   /78   Pulse 88   Resp 18   Temp 98.5 °F (36.9 °C)   Temp src Temporal   SpO2 92 %   O2 Device None (Room air)       Current Vitals:   Vital Signs  BP: 122/59  Pulse: 93  Resp: 19  Temp: 98.5 °F (36.9 °C)  Temp src: Temporal  MAP (mmHg): 78    Oxygen Therapy  SpO2: 96 %  O2 Device: Nasal cannula  O2 Flow Rate (L/min): 2 L/min        Physical Exam  Vitals and nursing note reviewed.   Constitutional:       Appearance: Normal appearance.   HENT:      Head: Normocephalic and atraumatic.   Cardiovascular:      Rate and Rhythm: Normal rate and regular rhythm.      Pulses: Normal pulses.           Radial pulses are 2+ on the right side and 2+ on the left side.      Heart sounds: Normal heart sounds.   Pulmonary:      Effort: Pulmonary effort is normal. Tachypnea present.      Breath sounds: Wheezing present.   Musculoskeletal:         General: Normal range of motion.      Cervical back: Normal range of motion.      Right lower leg: No edema.      Left lower leg: No edema.   Skin:     General: Skin is warm and dry.   Neurological:      General: No focal deficit present.      Mental Status: She is alert.             ED Course     Labs Reviewed   CBC WITH DIFFERENTIAL WITH PLATELET - Abnormal; Notable for the following components:       Result Value    RDW-SD 46.7 (*)     RDW 15.3 (*)     All other components within normal limits   COMP METABOLIC  PANEL (14) - Abnormal; Notable for the following components:    Glucose 132 (*)     ALT 54 (*)     AST 62 (*)     Alkaline Phosphatase 43 (*)     All other components within normal limits   MAGNESIUM - Normal   PRO BETA NATRIURETIC PEPTIDE - Normal     EKG    Rate, intervals and axes as noted on EKG Report.  Rate: 89  Rhythm: Sinus Rhythm  Reading: no stemi, interpreted by myself.            ED Course as of 01/02/25 1212  ------------------------------------------------------------  Time: 01/02 1003  Value: XR CHEST AP PORTABLE  (CPT=71045)  Comment: Per my independent interpretation, patient's CXR demonstrates no pneumothorax.    ------------------------------------------------------------  Time: 01/02 1142  Comment: Patient still wheezing and hypoxic after hour long neb.             [facility]          Admission disposition: 1/2/2025 12:07 PM           Medical Decision Making  Differential diagnosis includes but is not limited to pneumonia, CHF, COPD exacerbation, etc.    CBC chemistry were unremarkable.  BNP was normal, she does not appear to be volume overloaded.  I do not suspect CHF.  Patient was with significant wheezing on exam.  She was given steroids, albuterol and ipratropium.  Patient was still hypoxic.  She is positive for influenza.  Patient will be admitted for COPD exacerbation in the setting of influenza.    Rhythm Strip: Rate 100 sinus rhythm as interpreted by myself. The cardiac monitor was ordered secondary to the patient's hypoxia.     Complicating factors: The patient  has a past medical history of Anxiety state, Arthritis, Asthma ([facility]), Chronic pulmonary embolism without acute cor pulmonale (HCC) (10/28/2018), Class 2 obesity due to excess calories without serious comorbidity with body mass index (BMI) of 39.0 to 39.9 in adult (05/07/2018), Depression, Diabetes (HCC), Disorder of thyroid, Esophageal reflux, Fibromyalgia, Hearing impairment, High blood pressure, High cholesterol, Hyperlipidemia,  Hyperthyroidism, Insulin resistance, Morbid obesity with BMI of 40.0-44.9, adult (HCC), Osteoarthritis, Pneumonia due to organism, Prediabetes, Pulmonary embolism (HCC), Sleep apnea, and Visual impairment. and  has a past surgical history that includes other surgical history; needle biopsy left (Left); cholecystectomy; and colonoscopy (N/A, 6/17/2024). that contribute to the medical complexity of this ED evaluation.     Medical Record Review: I personally reviewed available prior medical records for any recent pertinent discharge summaries, testing, and procedures, and reviewed those reports.        Problems Addressed:  Acute exacerbation of chronic obstructive pulmonary disease (COPD) (HCC): acute illness or injury with systemic symptoms  Influenza A: acute illness or injury with systemic symptoms    Amount and/or Complexity of Data Reviewed  External Data Reviewed: labs.     Details: Patients viral panel from 12/31/24 reviewed, positive for influenza A  Labs: ordered. Decision-making details documented in ED Course.  Radiology: ordered and independent interpretation performed. Decision-making details documented in ED Course.  ECG/medicine tests: ordered and independent interpretation performed. Decision-making details documented in ED Course.  Discussion of management or test interpretation with external provider(s): Discussed with Dr. Sanchez who accepts admission, would like pulmonary consult.  Dr. Becker with pulmonology is on consult.    Risk  Decision regarding hospitalization.        Disposition and Plan     Clinical Impression:  1. Acute exacerbation of chronic obstructive pulmonary disease (COPD) (HCC)    2. Influenza A         Disposition:  Admit  1/2/2025 12:07 pm    Follow-up:  No follow-up provider specified.        Medications Prescribed:  Current Discharge Medication List              Supplementary Documentation:         Hospital Problems       Present on Admission  Date Reviewed: 12/31/2024             ICD-10-CM Noted POA    * (Principal) Acute exacerbation of chronic obstructive pulmonary disease (COPD) (HCC) J44.1 1/2/2025 Unknown            Signed by Veto Morse MD on 1/2/2025 12:12 PM         MEDICATIONS ADMINISTERED IN LAST 1 DAY:  albuterol (Ventolin) (2.5 MG/3ML) 0.083% nebulizer solution 2.5 mg       Date Action Dose Route User    1/3/2025 1321 Given 2.5 mg Nebulization Wanda Cunha, RCP    1/3/2025 0838 Given 2.5 mg Nebulization Wanda Cunha, RCP    1/3/2025 0049 Given 2.5 mg Nebulization Kathe Botello KATYA    1/2/2025 1734 Given 2.5 mg Nebulization Lamar Fischer S, P          benzonatate (Tessalon) cap 200 mg       Date Action Dose Route User    1/2/2025 1553 Given 200 mg Oral Lizet Faria RN          enoxaparin (Lovenox) 60 MG/0.6ML SUBQ injection 50 mg       Date Action Dose Route User    1/3/2025 0948 Given 50 mg Subcutaneous (Left Lower Abdomen) Ruth Broderick RN    1/2/2025 2040 Given 50 mg Subcutaneous (Left Upper Abdomen) Ruth Broderick RN    1/2/2025 1554 Given 50 mg Subcutaneous (Right Lower Abdomen) Lizet Faria RN          insulin aspart (NovoLOG) 100 Units/mL FlexPen 1-11 Units       Date Action Dose Route User    1/3/2025 1402 Given 6 Units Subcutaneous (Left Lower Abdomen) Rtuh Broderick RN    1/3/2025 0948 Given 7 Units Subcutaneous (Left Lower Abdomen) Ruth Broderick RN    1/2/2025 2123 Given 9 Units Subcutaneous (Left Upper Arm) Ruth Broderick RN    1/2/2025 1712 Given 9 Units Subcutaneous (Right Upper Arm) Lizet Faria RN          magnesium oxide (Mag-Ox) tab 400 mg       Date Action Dose Route User    1/2/2025 1553 Given 400 mg Oral Lizet Faria RN          methylPREDNISolone sodium succinate (Solu-MEDROL) injection 40 mg       Date Action Dose Route User    1/3/2025 0933 Given 40 mg Intravenous Ruth Broderick, RN    1/3/2025 0304 Given 40 mg Intravenous Erica Contreras, LAURO    1/2/2025 2037 Given 40 mg Intravenous  Ruth Broderick, RN    1/2/2025 1553 Given 40 mg Intravenous Lizet Faria RN          oseltamivir (Tamiflu) cap 75 mg       Date Action Dose Route User    1/3/2025 0933 Given 75 mg Oral Ruth Borderick RN    1/2/2025 2037 Given 75 mg Oral Ruth Broderick RN    1/2/2025 1553 Given 75 mg Oral Lizet Faria RN          pantoprazole (Protonix) DR tab 40 mg       Date Action Dose Route User    1/3/2025 1237 Given 40 mg Oral Ruth Broderick RN          propranolol (Inderal) tab 40 mg       Date Action Dose Route User    1/3/2025 1237 Given 40 mg Oral Ruth Broderick RN          sertraline (Zoloft) tab 50 mg       Date Action Dose Route User    1/3/2025 1237 Given 50 mg Oral Ruth Broderick RN            Vitals (last day)       Date/Time Temp Pulse Resp BP SpO2 Weight O2 Device O2 Flow Rate (L/min) [facility]    01/03/25 0934 98 °F (36.7 °C) 84 18 137/64 93 % -- Nasal cannula 2 L/min     01/03/25 0645 97.4 °F (36.3 °C) 74 18 133/63 93 % -- CPAP -- PK    01/03/25 0143 -- 82 -- -- 92 % -- -- -- AJ    01/03/25 0055 -- 85 -- -- 93 % -- -- --     01/02/25 2035 99.1 °F (37.3 °C) 97 18 137/69 95 % -- Nasal cannula 2 L/min     01/02/25 1413 -- -- -- -- -- 223 lb 12.3 oz (101.5 kg) -- --     01/02/25 1412 98.4 °F (36.9 °C) 84 18 132/63 96 % -- Nasal cannula 2 L/min     01/02/25 1400 -- 69 15 106/74 98 % -- Nasal cannula 2 L/min SW    01/02/25 1345 -- 82 22 142/74 98 % -- Nasal cannula 2 L/min     01/02/25 1330 -- 82 19 145/68 96 % -- Nasal cannula 2 L/min     01/02/25 1300 -- 81 18 128/64 98 % -- Nasal cannula 2 L/min     01/02/25 1142 -- -- -- -- 96 % -- Nasal cannula 2 L/min     01/02/25 1141 -- -- -- -- 89 % -- None (Room air) --     01/02/25 1130 -- 93 19 122/59 91 % -- -- --     01/02/25 1115 -- 91 20 145/57 94 % -- None (Room air) --     01/02/25 1100 -- 87 20 124/66 99 % -- None (Room air) --     01/02/25 1045 -- 86 16 153/62 99 % -- None (Room air) --     01/02/25 1030 --  81 21 136/61 100 % -- None (Room air) --     01/02/25 1015 -- 82 21 146/70 99 % -- None (Room air) --     01/02/25 1014 -- -- -- -- -- -- None (Room air) --     01/02/25 0945 -- 74 -- 123/63 99 % -- None (Room air) --     01/02/25 0928 -- -- -- -- -- -- None (Room air) --     01/02/25 0915 -- 85 20 109/57 94 % -- None (Room air) --     01/02/25 0748 98.5 °F (36.9 °C) 88 18 152/78 92 % 225 lb (102.1 kg) None (Room air) --           1/2/2025 Hospitalist Progress Note   [facility]     PATIENT'S NAME: AIDEN HAAS   ATTENDING PHYSICIAN: Xiao Sanchez MD   PATIENT ACCOUNT#:   [id number]    LOCATION:  [address] [place] [address]  MEDICAL RECORD #:   [id number]       YOB: 1960  ADMISSION DATE:       01/02/2025     HISTORY AND PHYSICAL EXAMINATION     CHIEF COMPLAINT:  Influenza A bronchitis with asthma exacerbation.     HISTORY OF PRESENT ILLNESS:  The patient is a 64-year-old  female with a history of asthma.  Had upper respiratory tract infection symptoms and tested positive for influenza A on December 31, three days ago.  Today presented to the emergency department for evaluation of progressive wheezing and dyspnea without exertion.  CBC and chemistry were unremarkable.  Magnesium 1.8.  Chest x-ray showed no acute findings.  The patient was initiated on Solu-Medrol nebulizer treatments, and she will be admitted to the hospital for further management.  Upon arrival to the emergency room, pulse ox was 88% on room air.     PAST MEDICAL HISTORY:  Morbid obesity, obstructive sleep apnea, asthma, hypothyroidism, hyperlipidemia, gastroesophageal reflux disease, fibromyalgia, diabetes mellitus type 2, osteoarthritis, depression, anxiety.     PAST SURGICAL HISTORY:  Cholecystectomy, left breast biopsy.     MEDICATIONS:  Please see medication reconciliation list.      ALLERGIES:  No known drug allergies.     FAMILY HISTORY:  Mother had hypertension, cerebrovascular accident, and diabetes  mellitus type 2.  Father had hypertension and Parkinson's disease.     SOCIAL HISTORY:  No tobacco, alcohol, or drug use.  Independent for basic activities of daily living.      REVIEW OF SYSTEMS:  Initially body aches, low-grade fever with progressive cough and then progressive wheezing with progressive dyspnea and difficulty with her basic activities of daily living.  Other 12-point review of systems is negative.         PHYSICAL EXAMINATION:    GENERAL:  Alert and oriented to time, place, and person.  Mild to moderate distress.  Visibly dyspneic.  VITAL SIGNS:  Temperature 98.5, pulse 93, respiratory rate 19, blood pressure 122/59, pulse ox 88% on room air, 96% on 2L nasal cannula oxygen.  HEENT:  Atraumatic.  Oropharynx clear.  Dry mucous membranes.  Ears and nose normal.  Eyes:  Anicteric sclerae.  NECK:  Supple.  No lymphadenopathy.  Trachea midline.  Full range of motion.  LUNGS:  Bilateral expiratory wheezing with increased respiratory effort.  HEART:  Regular rate and rhythm.  S1 and S2 auscultated.    ABDOMEN:  Soft and obese.  Positive bowel sounds.  No distention.  No tenderness.  EXTREMITIES:  No peripheral edema, clubbing, or cyanosis.  NEUROLOGIC:  Motor and sensory intact.        ASSESSMENT:    1.       Influenza A bronchitis with asthma exacerbation.  2.       Hypoxic respiratory failure.  3.       Morbid obesity.  4.       Diabetes mellitus type 2.     PLAN:  The patient will be admitted to general medical floor.  IV steroids.  Solu-Medrol nebulizer treatments, cough suppressants.  DVT prophylaxis.  Pulmonary consult.  Oxygen support.  Monitor respiratory and hemodynamic status.  Start her on Tamiflu.  Further recommendations to follow.       Dictated By Xiao Sanchez MD  d:2025 12:29:07      1/3/2025 Pulmonology Progress Note   [facility]  part of [facility]     Progress Note           Lena Macedo Patient Status:  Inpatient    3/10/1960 MRN [id number]   Location  [facility] 5SW/SE Attending Pretty Garcia MD   Hosp Day # 1 PCP Margaret Correa MD            Subjective:  Subjective:  Still with fatigue but no fever  Moist cough and wheezes  No colored sputum or hemoptysis  Generalized fatigue and pain  No abdominal pain     Objective:  Blood pressure 137/64, pulse 84, temperature 98 °F (36.7 °C), temperature source Axillary, resp. rate 18, weight 223 lb 12.3 oz (101.5 kg), SpO2 93%.  Physical Exam  Vitals and nursing note reviewed.   Constitutional:       General: She is not in acute distress.     Appearance: She is obese. She is ill-appearing.   HENT:      Head: Atraumatic.      Nose: Congestion present.      Mouth/Throat:      Mouth: Mucous membranes are moist.   Eyes:      General: No scleral icterus.  Cardiovascular:      Rate and Rhythm: Normal rate.      Heart sounds:      No gallop.   Pulmonary:      Effort: No respiratory distress.      Breath sounds: No stridor. Wheezing and rales present. No rhonchi.   Abdominal:      General: Abdomen is flat. Bowel sounds are normal. There is no distension.      Palpations: Abdomen is soft.   Musculoskeletal:      Cervical back: Normal range of motion.      Right lower leg: No edema.      Left lower leg: No edema.   Skin:     General: Skin is dry.   Neurological:      Mental Status: She is oriented to person, place, and time.               Results:        Lab Results   Component Value Date     WBC 4.8 01/03/2025     HGB 11.9 (L) 01/03/2025     HCT 37.9 01/03/2025     .0 01/03/2025     CREATSERUM 0.73 01/03/2025     BUN 15 01/03/2025      01/03/2025     K 3.8 01/03/2025      01/03/2025     CO2 26.0 01/03/2025      (H) 01/03/2025     CA 9.6 01/03/2025     ALB 4.4 01/02/2025     ALKPHO 43 (L) 01/02/2025     BILT 0.2 01/02/2025     TP 7.4 01/02/2025     AST 62 (H) 01/02/2025     ALT 54 (H) 01/02/2025     PTT 25.3 10/28/2018     INR 1.1 10/28/2018     T4F 1.1 09/17/2020     TSH 1.036 11/01/2024     DDIMER  0.89 (H) 10/28/2018     CRP 0.6 04/07/2014     MG 1.9 01/03/2025     TROP <0.045 04/28/2019     TROPHS 3 07/05/2022     B12 1,486 (H) 07/18/2022         XR CHEST AP PORTABLE  (CPT=71045)     Result Date: 1/2/2025  CONCLUSION:        Cardiac enlargement with secondary signs of slight fluid overload.    Dictated by (CST): Pedro Pride MD on 1/02/2025 at 9:46 AM     Finalized by (CST): Pedro Pride MD on 1/02/2025 at 9:47 AM         EKG 12 Lead     Result Date: 1/2/2025  Normal sinus rhythm Nonspecific ST and T wave abnormality Abnormal ECG When compared with ECG of 25-MAY-2024 09:04, Nonspecific T wave abnormality, improved in Lateral leads Confirmed by STEPHEN PRADHAN, ENMANUEL ([id number]) on 1/2/2025 11:11:02 AM         Assessment & Plan:  1-acute asthma exacerbation secondary to influenza A with acute hypoxemic respiratory failure  Chest x-ray mild nonspecific interstitial infiltrate  No leukocytosis     Plan;  Tamiflu  Supportive care  Taper steroid  Bronchial hygiene and nebulizers with bronchodilator EF  O2 therapy     2-GÉNESIS required CPAP 8 CWP  CPAP nightly     3-history of anxiety and fibromyalgia     4-DVT prophylaxis  Lovenox subcu    Alysha Lopez MD  1/3/2025

## 2025-01-03 NOTE — PLAN OF CARE
Problem: Patient Centered Care  Goal: Patient preferences are identified and integrated in the patient's plan of care  Description: Interventions:  - What would you like us to know as we care for you?   - Provide timely, complete, and accurate information to patient/family  - Incorporate patient and family knowledge, values, beliefs, and cultural backgrounds into the planning and delivery of care  - Encourage patient/family to participate in care and decision-making at the level they choose  - Honor patient and family perspectives and choices  1/3/2025 1553 by Ruth Broderick, RN  Outcome: Progressing  1/3/2025 1553 by Ruth Broderick, RN  Outcome: Progressing     Problem: DISCHARGE PLANNING  Goal: Discharge to home or other facility with appropriate resources  Description: INTERVENTIONS:  - Identify barriers to discharge w/pt and caregiver  - Include patient/family/discharge partner in discharge planning  - Arrange for needed discharge resources and transportation as appropriate  - Identify discharge learning needs (meds, wound care, etc)  - Arrange for interpreters to assist at discharge as needed  - Consider post-discharge preferences of patient/family/discharge partner  - Complete POLST form as appropriate  - Assess patient's ability to be responsible for managing their own health  - Refer to Case Management Department for coordinating discharge planning if the patient needs post-hospital services based on physician/LIP order or complex needs related to functional status, cognitive ability or social support system  Outcome: Progressing     Problem: RESPIRATORY - ADULT  Goal: Achieves optimal ventilation and oxygenation  Description: INTERVENTIONS:  - Assess for changes in respiratory status  - Assess for changes in mentation and behavior  - Position to facilitate oxygenation and minimize respiratory effort  - Oxygen supplementation based on oxygen saturation or ABGs  - Provide Smoking Cessation handout,  if applicable  - Encourage broncho-pulmonary hygiene including cough, deep breathe, Incentive Spirometry  - Assess the need for suctioning and perform as needed  - Assess and instruct to report SOB or any respiratory difficulty  - Respiratory Therapy support as indicated  - Manage/alleviate anxiety  - Monitor for signs/symptoms of CO2 retention  Outcome: Progressing

## 2025-01-03 NOTE — PROGRESS NOTES
[facility]  part of [facility]    Progress Note    Lena Macedo Patient Status:  Inpatient    3/10/1960 MRN [id number]   Location [facility] 5SW/SE Attending Pretty Garcia MD   Hosp Day # 1 PCP Margaret Correa MD       Subjective:   Subjective:  Still with fatigue but no fever  Moist cough and wheezes  No colored sputum or hemoptysis  Generalized fatigue and pain  No abdominal pain  Objective:   Blood pressure 137/64, pulse 84, temperature 98 °F (36.7 °C), temperature source Axillary, resp. rate 18, weight 223 lb 12.3 oz (101.5 kg), SpO2 93%.  Physical Exam  Vitals and nursing note reviewed.   Constitutional:       General: She is not in acute distress.     Appearance: She is obese. She is ill-appearing.   HENT:      Head: Atraumatic.      Nose: Congestion present.      Mouth/Throat:      Mouth: Mucous membranes are moist.   Eyes:      General: No scleral icterus.  Cardiovascular:      Rate and Rhythm: Normal rate.      Heart sounds:      No gallop.   Pulmonary:      Effort: No respiratory distress.      Breath sounds: No stridor. Wheezing and rales present. No rhonchi.   Abdominal:      General: Abdomen is flat. Bowel sounds are normal. There is no distension.      Palpations: Abdomen is soft.   Musculoskeletal:      Cervical back: Normal range of motion.      Right lower leg: No edema.      Left lower leg: No edema.   Skin:     General: Skin is dry.   Neurological:      Mental Status: She is oriented to person, place, and time.         Results:   Lab Results   Component Value Date    WBC 4.8 2025    HGB 11.9 (L) 2025    HCT 37.9 2025    .0 2025    CREATSERUM 0.73 2025    BUN 15 2025     2025    K 3.8 2025     2025    CO2 26.0 2025     (H) 2025    CA 9.6 2025    ALB 4.4 2025    ALKPHO 43 (L) 2025    BILT 0.2 2025    TP 7.4 2025    AST 62 (H) 2025    ALT  54 (H) 01/02/2025    PTT 25.3 10/28/2018    INR 1.1 10/28/2018    T4F 1.1 09/17/2020    TSH 1.036 11/01/2024    DDIMER 0.89 (H) 10/28/2018    CRP 0.6 04/07/2014    MG 1.9 01/03/2025    TROP <0.045 04/28/2019    TROPHS 3 07/05/2022    B12 1,486 (H) 07/18/2022       XR CHEST AP PORTABLE  (CPT=71045)    Result Date: 1/2/2025  CONCLUSION: Cardiac enlargement with secondary signs of slight fluid overload.    Dictated by (CST): Pedro Pride MD on 1/02/2025 at 9:46 AM     Finalized by (CST): Pedro Pride MD on 1/02/2025 at 9:47 AM         EKG 12 Lead    Result Date: 1/2/2025  Normal sinus rhythm Nonspecific ST and T wave abnormality Abnormal ECG When compared with ECG of 25-MAY-2024 09:04, Nonspecific T wave abnormality, improved in Lateral leads Confirmed by STEPHEN PRADHAN, ENMANUEL ([id number]) on 1/2/2025 11:11:02 AM     Assessment & Plan:       1-acute asthma exacerbation secondary to influenza A with acute hypoxemic respiratory failure  Chest x-ray mild nonspecific interstitial infiltrate  No leukocytosis     Plan;  Tamiflu  Supportive care  Taper steroid  Bronchial hygiene and nebulizers with bronchodilator EF  O2 therapy     2-GÉNESIS required CPAP 8 CWP  CPAP nightly     3-history of anxiety and fibromyalgia     4-DVT prophylaxis  Lovenox subcu              Alysha Lopez MD  1/3/2025

## 2025-01-04 LAB
GLUCOSE BLDC GLUCOMTR-MCNC: 149 MG/DL (ref 70–99)
GLUCOSE BLDC GLUCOMTR-MCNC: 153 MG/DL (ref 70–99)
GLUCOSE BLDC GLUCOMTR-MCNC: 154 MG/DL (ref 70–99)
GLUCOSE BLDC GLUCOMTR-MCNC: 183 MG/DL (ref 70–99)
GLUCOSE BLDC GLUCOMTR-MCNC: 214 MG/DL (ref 70–99)
UFH PPP CHRO-ACNC: 0.47 IU/ML

## 2025-01-04 PROCEDURE — 99233 SBSQ HOSP IP/OBS HIGH 50: CPT | Performed by: HOSPITALIST

## 2025-01-04 PROCEDURE — 99232 SBSQ HOSP IP/OBS MODERATE 35: CPT | Performed by: INTERNAL MEDICINE

## 2025-01-04 RX ORDER — ENOXAPARIN SODIUM 100 MG/ML
50 INJECTION SUBCUTANEOUS 2 TIMES DAILY
Status: DISCONTINUED | OUTPATIENT
Start: 2025-01-04 | End: 2025-01-06

## 2025-01-04 NOTE — PROGRESS NOTES
[facility]  part of [facility]    Progress Note      Assessment and Plan:   1.  Influenza A exacerbating asthma-not quite ready to go home but improving.    Recommendations:  1.  Tamiflu  2.  Steroid  3.  Taper oxygen  4.  Will follow clinically.    2.  GÉNESIS-CPAP 8 CWP nightly    3.  DVT prophylaxis-Lovenox    Subjective:   Lena Macedo is a(n) 64 year old female who is yet mildly dyspneic    Objective:   Blood pressure 133/63, pulse 59, temperature 98.3 °F (36.8 °C), temperature source Oral, resp. rate 18, weight 223 lb 12.3 oz (101.5 kg), SpO2 92%.    Physical Exam alert white female  HEENT examination is unremarkable with pupils equal round and reactive to light and accommodation.   Neck without adenopathy, thyromegaly, JVD nor bruit.   Lungs subtle central expiratory rattle to auscultation and percussion.  Cardiac regular rate and rhythm no murmur.   Abdomen nontender, without hepatosplenomegaly and no mass appreciable.   Extremities without clubbing cyanosis nor edema.   Neurologic grossly intact with symmetric tone and strength and reflex.  Skin without gross abnormality     Results:        Dave Becker MD  Medical Director, Critical Care, [facility]  Medical Director, [facility]  Pager: [phone]

## 2025-01-04 NOTE — PROGRESS NOTES
[facility]  part of [facility]    Progress Note    Lena Macedo Patient Status:  Inpatient    3/10/1960 MRN [id number]   Location [facility] 5SW/SE Attending Pretty Garcia MD   Hosp Day # 2 PCP Margaret Correa MD       Subjective:   Lena Macedo is a(n) 64 year old female who still has a productive cough. No fever. Having diarrhea.     Objective:   Blood pressure 133/63, pulse 59, temperature 98.3 °F (36.8 °C), temperature source Oral, resp. rate 18, weight 223 lb 12.3 oz (101.5 kg), SpO2 92%.    Physical Exam:    General: No acute distress.   Respiratory: Clear to auscultation bilaterally. No wheezes. No rhonchi.  Cardiovascular: S1, S2. Regular rate and rhythm. No murmurs, rubs or gallops.   Abdomen: Soft, nontender, nondistended.  Positive bowel sounds. No rebound or guarding.  Neurologic: No focal neurological deficits.   Musculoskeletal: Moves all extremities.  Extremities: No edema.    Results:     Lab Results   Component Value Date    WBC 4.8 2025    HGB 11.9 (L) 2025    HCT 37.9 2025    .0 2025    CREATSERUM 0.73 2025    BUN 15 2025     2025    K 3.8 2025     2025    CO2 26.0 2025     (H) 2025    CA 9.6 2025    ALB 4.4 2025    ALKPHO 43 (L) 2025    BILT 0.2 2025    TP 7.4 2025    AST 62 (H) 2025    ALT 54 (H) 2025    PTT 25.3 10/28/2018    INR 1.1 10/28/2018    T4F 1.1 2020    TSH 1.036 2024    DDIMER 0.89 (H) 10/28/2018    CRP 0.6 2014    MG 1.9 2025    TROP <0.045 2019    B12 1,486 (H) 2022       XR CHEST AP PORTABLE  (CPT=71045)    Result Date: 2025  CONCLUSION: Cardiac enlargement with secondary signs of slight fluid overload.    Dictated by (CST): Pedro Pride MD on 2025 at 9:46 AM     Finalized by (CST): Pedro Pride MD on 2025 at 9:47 AM              fenofibrate micronized  67  mg Oral Daily with breakfast    levothyroxine  137 mcg Oral Before breakfast    pantoprazole  40 mg Oral Before breakfast    propranolol  40 mg Oral BID    sertraline  50 mg Oral Daily    pravastatin  10 mg Oral Nightly    methylPREDNISolone  40 mg Intravenous Q12H    insulin aspart  1-11 Units Subcutaneous TID CC and HS    enoxaparin  0.5 mg/kg Subcutaneous BID    oseltamivir  75 mg Oral BID    albuterol  2.5 mg Nebulization 4 times per day       hydrOXYzine    ipratropium-albuterol    glucose **OR** glucose **OR** glucose-vitamin C **OR** dextrose **OR** glucose **OR** glucose **OR** glucose-vitamin C    acetaminophen    ondansetron    metoclopramide    temazepam    guaiFENesin    benzonatate      Assessment and Plan:     Acute exacerbation of chronic obstructive pulmonary disease (COPD) (HCC)  - Continue on IV Solumedrol 40 mg daily  - duonebs PRN  - Pulm on board       Influenza A  - Contine on tamiflu  - home when ok with Pulm     Diarrhea  - stool for Cdiff     Hypothyroidism  Major depression  Dyslipidemia  - conitnue home meds    DM2  - SSI low dose  - accuchecks AC and HS        Quality:  DVT Prophylaxis: Lovenox  CODE status: Full    [email or web address]    DILLAN ELENA MD  01/04/25

## 2025-01-04 NOTE — PLAN OF CARE
Problem: Patient Centered Care  Goal: Patient preferences are identified and integrated in the patient's plan of care  Description: Interventions:  - What would you like us to know as we care for you?   - Provide timely, complete, and accurate information to patient/family  - Incorporate patient and family knowledge, values, beliefs, and cultural backgrounds into the planning and delivery of care  - Encourage patient/family to participate in care and decision-making at the level they choose  - Honor patient and family perspectives and choices  Outcome: Progressing     Problem: DISCHARGE PLANNING  Goal: Discharge to home or other facility with appropriate resources  Description: INTERVENTIONS:  - Identify barriers to discharge w/pt and caregiver  - Include patient/family/discharge partner in discharge planning  - Arrange for needed discharge resources and transportation as appropriate  - Identify discharge learning needs (meds, wound care, etc)  - Arrange for interpreters to assist at discharge as needed  - Consider post-discharge preferences of patient/family/discharge partner  - Complete POLST form as appropriate  - Assess patient's ability to be responsible for managing their own health  - Refer to Case Management Department for coordinating discharge planning if the patient needs post-hospital services based on physician/LIP order or complex needs related to functional status, cognitive ability or social support system  Outcome: Progressing     Problem: RESPIRATORY - ADULT  Goal: Achieves optimal ventilation and oxygenation  Description: INTERVENTIONS:  - Assess for changes in respiratory status  - Assess for changes in mentation and behavior  - Position to facilitate oxygenation and minimize respiratory effort  - Oxygen supplementation based on oxygen saturation or ABGs  - Provide Smoking Cessation handout, if applicable  - Encourage broncho-pulmonary hygiene including cough, deep breathe, Incentive  Spirometry  - Assess the need for suctioning and perform as needed  - Assess and instruct to report SOB or any respiratory difficulty  - Respiratory Therapy support as indicated  - Manage/alleviate anxiety  - Monitor for signs/symptoms of CO2 retention  Outcome: Progressing

## 2025-01-04 NOTE — PROGRESS NOTES
[facility]  part of [facility]    Progress Note    Lena Macedo Patient Status:  Inpatient    3/10/1960 MRN [id number]   Location [facility] 5SW/SE Attending Pretty Garcia MD   Hosp Day # 2 PCP Margaret Correa MD       Subjective:   Lena Macedo is a(n) 64 year old female who is feeling a little better. Afebrile.     Objective:   Blood pressure 133/63, pulse 59, temperature 98.3 °F (36.8 °C), temperature source Oral, resp. rate 18, weight 223 lb 12.3 oz (101.5 kg), SpO2 92%.    Physical Exam:    General: No acute distress.   Respiratory: Clear to auscultation bilaterally. No wheezes. No rhonchi.  Cardiovascular: S1, S2. Regular rate and rhythm. No murmurs, rubs or gallops.   Abdomen: Soft, nontender, nondistended.  Positive bowel sounds. No rebound or guarding.  Neurologic: No focal neurological deficits.   Musculoskeletal: Moves all extremities.  Extremities: No edema.    Results:     Lab Results   Component Value Date    WBC 4.8 2025    HGB 11.9 (L) 2025    HCT 37.9 2025    .0 2025    CREATSERUM 0.73 2025    BUN 15 2025     2025    K 3.8 2025     2025    CO2 26.0 2025     (H) 2025    CA 9.6 2025    ALB 4.4 2025    ALKPHO 43 (L) 2025    BILT 0.2 2025    TP 7.4 2025    AST 62 (H) 2025    ALT 54 (H) 2025    PTT 25.3 10/28/2018    INR 1.1 10/28/2018    T4F 1.1 2020    TSH 1.036 2024    DDIMER 0.89 (H) 10/28/2018    CRP 0.6 2014    MG 1.9 2025    TROP <0.045 2019    B12 1,486 (H) 2022       XR CHEST AP PORTABLE  (CPT=71045)    Result Date: 2025  CONCLUSION: Cardiac enlargement with secondary signs of slight fluid overload.    Dictated by (CST): Pedro Pride MD on 2025 at 9:46 AM     Finalized by (CST): Pedro Pride MD on 2025 at 9:47 AM              fenofibrate micronized  67 mg Oral Daily with  breakfast    levothyroxine  137 mcg Oral Before breakfast    pantoprazole  40 mg Oral Before breakfast    propranolol  40 mg Oral BID    sertraline  50 mg Oral Daily    pravastatin  10 mg Oral Nightly    methylPREDNISolone  40 mg Intravenous Q12H    insulin aspart  1-11 Units Subcutaneous TID CC and HS    enoxaparin  0.5 mg/kg Subcutaneous BID    oseltamivir  75 mg Oral BID    albuterol  2.5 mg Nebulization 4 times per day       hydrOXYzine    ipratropium-albuterol    glucose **OR** glucose **OR** glucose-vitamin C **OR** dextrose **OR** glucose **OR** glucose **OR** glucose-vitamin C    acetaminophen    ondansetron    metoclopramide    temazepam    guaiFENesin    benzonatate      Assessment and Plan:     Acute exacerbation of chronic obstructive pulmonary disease (COPD) (HCC)  - Continue on IV Solumedrol 40 mg daily  - duonebs PRN  - Pulm on board       Influenza A  - Contine on tamiflu  - home when ok with Pulm     Hypothyroidism  Major depression  Dyslipidemia  - conitnue home meds    DM2  - SSI low dose  - accuchecks AC and HS        Quality:  DVT Prophylaxis: Lovenox  CODE status: Full    [email or web address]    DILLAN ELENA MD  01/03/25

## 2025-01-05 LAB
ANION GAP SERPL CALC-SCNC: 10 MMOL/L (ref 0–18)
BUN BLD-MCNC: 20 MG/DL (ref 9–23)
BUN/CREAT SERPL: 29.9 (ref 10–20)
CALCIUM BLD-MCNC: 9.1 MG/DL (ref 8.7–10.4)
CHLORIDE SERPL-SCNC: 102 MMOL/L (ref 98–112)
CO2 SERPL-SCNC: 28 MMOL/L (ref 21–32)
CREAT BLD-MCNC: 0.67 MG/DL
DEPRECATED RDW RBC AUTO: 42.8 FL (ref 35.1–46.3)
EGFRCR SERPLBLD CKD-EPI 2021: 97 ML/MIN/1.73M2 (ref 60–?)
ERYTHROCYTE [DISTWIDTH] IN BLOOD BY AUTOMATED COUNT: 14.4 % (ref 11–15)
GLUCOSE BLD-MCNC: 141 MG/DL (ref 70–99)
GLUCOSE BLDC GLUCOMTR-MCNC: 119 MG/DL (ref 70–99)
GLUCOSE BLDC GLUCOMTR-MCNC: 149 MG/DL (ref 70–99)
GLUCOSE BLDC GLUCOMTR-MCNC: 180 MG/DL (ref 70–99)
GLUCOSE BLDC GLUCOMTR-MCNC: 195 MG/DL (ref 70–99)
HCT VFR BLD AUTO: 34.3 %
HGB BLD-MCNC: 11.7 G/DL
MCH RBC QN AUTO: 27.9 PG (ref 26–34)
MCHC RBC AUTO-ENTMCNC: 34.1 G/DL (ref 31–37)
MCV RBC AUTO: 81.9 FL
OSMOLALITY SERPL CALC.SUM OF ELEC: 295 MOSM/KG (ref 275–295)
PLATELET # BLD AUTO: 254 10(3)UL (ref 150–450)
POTASSIUM SERPL-SCNC: 3.6 MMOL/L (ref 3.5–5.1)
RBC # BLD AUTO: 4.19 X10(6)UL
SODIUM SERPL-SCNC: 140 MMOL/L (ref 136–145)
WBC # BLD AUTO: 11.1 X10(3) UL (ref 4–11)

## 2025-01-05 PROCEDURE — 99232 SBSQ HOSP IP/OBS MODERATE 35: CPT | Performed by: INTERNAL MEDICINE

## 2025-01-05 PROCEDURE — 99233 SBSQ HOSP IP/OBS HIGH 50: CPT | Performed by: HOSPITALIST

## 2025-01-05 RX ORDER — PREDNISONE 20 MG/1
20 TABLET ORAL
Status: DISCONTINUED | OUTPATIENT
Start: 2025-01-05 | End: 2025-01-06

## 2025-01-05 NOTE — PROGRESS NOTES
[facility]  part of [facility]    Progress Note    Lena Macedo Patient Status:  Inpatient    3/10/1960 MRN [id number]   Location [facility] 5SW/SE Attending Pretty Garcia MD   Hosp Day # 3 PCP Margaret Correa MD       Subjective:   Lena Macedo is feeling a little better. Still doesn't feel well enough to go home today.    Objective:   Blood pressure 146/77, pulse 61, temperature 97.8 °F (36.6 °C), temperature source Oral, resp. rate 18, weight 223 lb 12.3 oz (101.5 kg), SpO2 91%.    Physical Exam:    General: No acute distress.   Respiratory: Clear to auscultation bilaterally. No wheezes. No rhonchi.  Cardiovascular: S1, S2. Regular rate and rhythm. No murmurs, rubs or gallops.   Abdomen: Soft, nontender, nondistended.  Positive bowel sounds. No rebound or guarding.  Neurologic: No focal neurological deficits.   Musculoskeletal: Moves all extremities.  Extremities: No edema.    Results:     Lab Results   Component Value Date    WBC 11.1 (H) 2025    HGB 11.7 (L) 2025    HCT 34.3 (L) 2025    .0 2025    CREATSERUM 0.67 2025    BUN 20 2025     2025    K 3.6 2025     2025    CO2 28.0 2025     (H) 2025    CA 9.1 2025    ALB 4.4 2025    ALKPHO 43 (L) 2025    BILT 0.2 2025    TP 7.4 2025    AST 62 (H) 2025    ALT 54 (H) 2025    PTT 25.3 10/28/2018    INR 1.1 10/28/2018    T4F 1.1 2020    TSH 1.036 2024    DDIMER 0.89 (H) 10/28/2018    CRP 0.6 2014    MG 1.9 2025    TROP <0.045 2019    B12 1,486 (H) 2022       No results found.       predniSONE  20 mg Oral Daily with breakfast    enoxaparin  50 mg Subcutaneous BID    fenofibrate micronized  67 mg Oral Daily with breakfast    levothyroxine  137 mcg Oral Before breakfast    pantoprazole  40 mg Oral Before breakfast    propranolol  40 mg Oral BID    sertraline  50 mg Oral  Daily    pravastatin  10 mg Oral Nightly    insulin aspart  1-11 Units Subcutaneous TID CC and HS    oseltamivir  75 mg Oral BID    albuterol  2.5 mg Nebulization 4 times per day       hydrOXYzine    ipratropium-albuterol    glucose **OR** glucose **OR** glucose-vitamin C **OR** dextrose **OR** glucose **OR** glucose **OR** glucose-vitamin C    acetaminophen    ondansetron    metoclopramide    temazepam    guaiFENesin    benzonatate      Assessment and Plan:     Acute exacerbation of chronic obstructive pulmonary disease (COPD) (HCC)  - Continue on IV Solumedrol 40 mg daily- change to prednisone 20 mg daily   - duonebs PRN  - Pulm on board       Influenza A  - Contine on tamiflu  - home when ok with Pulm     Diarrhea  - stool for Cdiff     Hypothyroidism  Major depression  Dyslipidemia  - conitnue home meds    DM2  - SSI low dose  - accuchecks AC and HS        Quality:  DVT Prophylaxis: Lovenox  CODE status: Full    [email or web address]    DILLAN ELENA MD  01/05/25

## 2025-01-05 NOTE — PROGRESS NOTES
[facility]  part of [facility]    Progress Note      Assessment and Plan:   1.  Influenza A exacerbating asthma-improving clinically.  Okay to home from my perspective.    Recommendations:  1.  Tamiflu  2.  Switch to oral steroid for short course at discharge such as prednisone 20 mg daily for 5 days.  3.  Taper oxygen to off  4.  Will follow clinically.  5.  Discharge planning.  The patient should see me in the office at the 3 to 4-week interval or sooner if needed.  I have followed her previously for asthma, although I have not seen her for 5 years.    2.  GÉNESIS-CPAP 8 CWP nightly    3.  DVT prophylaxis-Lovenox    Subjective:   Lena Macedo is a(n) 64 year old female who is breathing better    Objective:   Blood pressure 146/77, pulse 56, temperature 97.8 °F (36.6 °C), temperature source Oral, resp. rate 18, weight 223 lb 12.3 oz (101.5 kg), SpO2 92%.    Physical Exam alert white female  HEENT examination is unremarkable with pupils equal round and reactive to light and accommodation.   Neck without adenopathy, thyromegaly, JVD nor bruit.   Lungs subtle central expiratory rattle to auscultation and percussion.  Cardiac regular rate and rhythm no murmur.   Abdomen nontender, without hepatosplenomegaly and no mass appreciable.   Extremities without clubbing cyanosis nor edema.   Neurologic grossly intact with symmetric tone and strength and reflex.  Skin without gross abnormality     Results:     Lab Results   Component Value Date    WBC 11.1 01/05/2025    HGB 11.7 01/05/2025    HCT 34.3 01/05/2025    .0 01/05/2025    CREATSERUM 0.67 01/05/2025    BUN 20 01/05/2025     01/05/2025    K 3.6 01/05/2025     01/05/2025    CO2 28.0 01/05/2025     01/05/2025    CA 9.1 01/05/2025       Dave Becker MD  Medical Director, Critical Care, [facility]  Medical Director, [facility]  Pager: [phone]

## 2025-01-06 VITALS
BODY MASS INDEX: 42 KG/M2 | OXYGEN SATURATION: 95 % | WEIGHT: 223.75 LBS | DIASTOLIC BLOOD PRESSURE: 62 MMHG | RESPIRATION RATE: 18 BRPM | TEMPERATURE: 98 F | SYSTOLIC BLOOD PRESSURE: 129 MMHG | HEART RATE: 60 BPM

## 2025-01-06 LAB
ANION GAP SERPL CALC-SCNC: 8 MMOL/L (ref 0–18)
BUN BLD-MCNC: 20 MG/DL (ref 9–23)
BUN/CREAT SERPL: 27.8 (ref 10–20)
CALCIUM BLD-MCNC: 9.1 MG/DL (ref 8.7–10.4)
CHLORIDE SERPL-SCNC: 102 MMOL/L (ref 98–112)
CO2 SERPL-SCNC: 30 MMOL/L (ref 21–32)
CREAT BLD-MCNC: 0.72 MG/DL
DEPRECATED RDW RBC AUTO: 43 FL (ref 35.1–46.3)
EGFRCR SERPLBLD CKD-EPI 2021: 93 ML/MIN/1.73M2 (ref 60–?)
ERYTHROCYTE [DISTWIDTH] IN BLOOD BY AUTOMATED COUNT: 14.1 % (ref 11–15)
GLUCOSE BLD-MCNC: 116 MG/DL (ref 70–99)
GLUCOSE BLDC GLUCOMTR-MCNC: 111 MG/DL (ref 70–99)
GLUCOSE BLDC GLUCOMTR-MCNC: 156 MG/DL (ref 70–99)
HCT VFR BLD AUTO: 36.3 %
HGB BLD-MCNC: 11.5 G/DL
MCH RBC QN AUTO: 26.4 PG (ref 26–34)
MCHC RBC AUTO-ENTMCNC: 31.7 G/DL (ref 31–37)
MCV RBC AUTO: 83.4 FL
OSMOLALITY SERPL CALC.SUM OF ELEC: 294 MOSM/KG (ref 275–295)
PLATELET # BLD AUTO: 287 10(3)UL (ref 150–450)
POTASSIUM SERPL-SCNC: 3.2 MMOL/L (ref 3.5–5.1)
RBC # BLD AUTO: 4.35 X10(6)UL
SODIUM SERPL-SCNC: 140 MMOL/L (ref 136–145)
UFH PPP CHRO-ACNC: 0.52 IU/ML
WBC # BLD AUTO: 13.1 X10(3) UL (ref 4–11)

## 2025-01-06 PROCEDURE — 99232 SBSQ HOSP IP/OBS MODERATE 35: CPT | Performed by: INTERNAL MEDICINE

## 2025-01-06 PROCEDURE — 99239 HOSP IP/OBS DSCHRG MGMT >30: CPT | Performed by: HOSPITALIST

## 2025-01-06 RX ORDER — OSELTAMIVIR PHOSPHATE 75 MG/1
75 CAPSULE ORAL 2 TIMES DAILY
Qty: 4 CAPSULE | Refills: 0 | Status: SHIPPED | OUTPATIENT
Start: 2025-01-06 | End: 2025-01-08

## 2025-01-06 RX ORDER — ENOXAPARIN SODIUM 100 MG/ML
40 INJECTION SUBCUTANEOUS 2 TIMES DAILY
Status: DISCONTINUED | OUTPATIENT
Start: 2025-01-06 | End: 2025-01-06

## 2025-01-06 RX ORDER — POTASSIUM CHLORIDE 1500 MG/1
40 TABLET, EXTENDED RELEASE ORAL ONCE
Status: COMPLETED | OUTPATIENT
Start: 2025-01-06 | End: 2025-01-06

## 2025-01-06 RX ORDER — PREDNISONE 20 MG/1
20 TABLET ORAL
Qty: 5 TABLET | Refills: 0 | Status: SHIPPED | OUTPATIENT
Start: 2025-01-07 | End: 2025-01-12

## 2025-01-06 RX ORDER — ALBUTEROL SULFATE 0.83 MG/ML
2.5 SOLUTION RESPIRATORY (INHALATION)
Status: DISCONTINUED | OUTPATIENT
Start: 2025-01-06 | End: 2025-01-06

## 2025-01-06 RX ORDER — ACETAMINOPHEN 500 MG
500 TABLET ORAL EVERY 4 HOURS PRN
Status: DISCONTINUED | OUTPATIENT
Start: 2025-01-06 | End: 2025-01-06

## 2025-01-06 NOTE — PROGRESS NOTES
[facility]  part of [facility]    Progress Note      Assessment and Plan:   1.  Influenza A exacerbating asthma-improving clinically.  I believe the patient is significantly improved; however, she does not feel she is well enough to go home today.  Would anticipate home tomorrow.    Recommendations:  1.  Tamiflu  2.  Switch to oral steroid for short course at discharge such as prednisone 20 mg daily for 5 days.  3.  Taper oxygen to off  4.  Will follow clinically.  5.  Discharge planning.  The patient should see me in the office at the 3 to 4-week interval or sooner if needed.  I have followed her previously for asthma, although I have not seen her for 5 years.    2.  GÉNESIS-CPAP 8 CWP nightly    3.  DVT prophylaxis-Lovenox    Subjective:   Lena Macedo is a(n) 64 year old female who is breathing better but yet endorses modest dyspnea.    Objective:   Blood pressure 129/62, pulse 60, temperature 97.8 °F (36.6 °C), temperature source Oral, resp. rate 18, weight 223 lb 12.3 oz (101.5 kg), SpO2 95%.    Physical Exam alert white female  HEENT examination is unremarkable with pupils equal round and reactive to light and accommodation.   Neck without adenopathy, thyromegaly, JVD nor bruit.   Lungs subtle central expiratory rattle to auscultation and percussion.  Cardiac regular rate and rhythm no murmur.   Abdomen nontender, without hepatosplenomegaly and no mass appreciable.   Extremities without clubbing cyanosis nor edema.   Neurologic grossly intact with symmetric tone and strength and reflex.  Skin without gross abnormality     Results:     Lab Results   Component Value Date    WBC 13.1 01/06/2025    HGB 11.5 01/06/2025    HCT 36.3 01/06/2025    .0 01/06/2025    CREATSERUM 0.72 01/06/2025    BUN 20 01/06/2025     01/06/2025    K 3.2 01/06/2025     01/06/2025    CO2 30.0 01/06/2025     01/06/2025    CA 9.1 01/06/2025       Dave Becker MD  Medical Director, Critical  Care, [facility]  Medical Director, [facility]  Pager: [phone]

## 2025-01-06 NOTE — DISCHARGE SUMMARY
[facility]  part of [facility]    Discharge Summary    eLna Macedo Patient Status:  Inpatient    3/10/1960 MRN [id number]   Location [facility] 5SW/SE Attending Pretty Garcia MD   Hosp Day # 4 PCP Margaret Correa MD     Date of Admission: 2025   Date of Discharge: 2025    Hospital Discharge Diagnoses: Acute Influenza A    Lace+ Score: 64  59-90 High Risk  29-58 Medium Risk  0-28   Low Risk.    TCM Follow-Up Recommendation:  LACE > 58: High Risk of readmission after discharge from the hospital.        Admitting Diagnosis: Influenza A [J10.1]  Acute exacerbation of chronic obstructive pulmonary disease (COPD) (HCC) [J44.1]    Disposition: Home    Discharge Diagnosis: .Principal Problem:    Acute exacerbation of chronic obstructive pulmonary disease (COPD) (HCC)  Active Problems:    Influenza A    Influenzal bronchitis      Hospital Course:   Reason for Admission:   Per Dr. Sanchez  The patient is a 64-year-old  female with a history of asthma. Had upper respiratory tract infection symptoms and tested positive for influenza A on , three days ago. Today presented to the emergency department for evaluation of progressive wheezing and dyspnea without exertion. CBC and chemistry were unremarkable. Magnesium 1.8. Chest x-ray showed no acute findings. The patient was initiated on Solu-Medrol nebulizer treatments, and she will be admitted to the hospital for further management. Upon arrival to the emergency room, pulse ox was 88% on room air.     Discharge Physical Exam:   Physical Exam:    General: No acute distress.   Respiratory: Clear to auscultation bilaterally. No wheezes. No rhonchi.  Cardiovascular: S1, S2. Regular rate and rhythm. No murmurs, rubs or gallops.   Abdomen: Soft, nontender, nondistended.  Positive bowel sounds. No rebound or guarding.  Neurologic: No focal neurological deficits.   Musculoskeletal: Moves all extremities.    Hospital Course:     Acute exacerbation of chronic obstructive pulmonary disease (COPD) ([facility])  - Continue on IV Solumedrol 40 mg daily- change to prednisone 20 mg daily for 5 days  - duonebs PRN  - Pulm on board        Influenza A  - Contine on tamiflu  - home when ok with Pulm      Diarrhea  - stool for Cdiff      Hypothyroidism  Major depression  Dyslipidemia  - conitnue home meds     DM2  - SSI low dose  - accuchecks AC and HS           Quality:  DVT Prophylaxis: heparin       Complications: none    Consultants         Provider   Role Specialty     Dave Becker MD      Consulting Physician PULMONARY DISEASES                Discharge Plan:   Discharge Condition: Stable    Current Discharge Medication List        New Orders    Details   guaiFENesin 100 MG/5 ML Oral Take 10 mL (200 mg total) by mouth every 4 (four) hours as needed.      predniSONE 20 MG Oral Tab Take 1 tablet (20 mg total) by mouth daily with breakfast for 5 days.           Home Meds - Modified    Details   oseltamivir 75 MG Oral Cap Take 1 capsule (75 mg total) by mouth 2 (two) times daily for 2 days.           Home Meds - Unchanged    Details   albuterol 108 (90 Base) MCG/ACT Inhalation Aero Soln Inhale 2 puffs into the lungs every 6 (six) hours as needed for Wheezing or Shortness of Breath.      acetaminophen 500 MG Oral Tab Take 2 tablets (1,000 mg total) by mouth every 6 (six) hours as needed for Pain.      semaglutide (OZEMPIC, 0.25 OR 0.5 MG/DOSE,) 2 MG/3ML Subcutaneous Solution Pen-injector INYECTE 0.5 MG INTO THE SKIN GINNY VEZ POR SEMANA      hydrOXYzine Pamoate 25 MG Oral Cap Take 1 capsule (25 mg total) by mouth 3 (three) times daily as needed for Itching.      propranolol 40 MG Oral Tab Take 1 tablet (40 mg total) by mouth 2 (two) times daily.      aspirin 81 MG Oral Tab EC Take 1 tablet (81 mg total) by mouth daily.      alendronate (FOSAMAX) 70 MG Oral Tab Take 1 tablet (70 mg total) by mouth every 7 days.      levothyroxine (SYNTHROID) 137 MCG Oral  Tab Take 137 mcg by mouth before breakfast.      pantoprazole 40 MG Oral Tab EC Take 1 tablet (40 mg total) by mouth before breakfast.      Fenofibrate 160 MG Oral Tab Take 1 tablet (160 mg total) by mouth daily.      ergocalciferol 1.25 MG (02622 UT) Oral Cap TOME GINNY CAPSULA POR VIA ORAL GINNY VES POR SEMANA      sertraline 50 MG Oral Tab Take 1.5 tablets (75 mg total) by mouth daily.      simvastatin 40 MG Oral Tab Take 1 tablet (40 mg total) by mouth nightly.      Incontinence Supply Disposable (COMFORT PROTECT ADULT DIAPER/L) Does not apply Misc 1 each in the morning, at noon, and at bedtime.                 Discharge Diet: As tolerated    Discharge Activity: As tolerated       Discharge Medications        START taking these medications        Instructions Prescription details   guaiFENesin 100 MG/5 ML  Commonly known as: Robitussin      Take 10 mL (200 mg total) by mouth every 4 (four) hours as needed.   Quantity: 1 each  Refills: 0     predniSONE 20 MG Tabs  Commonly known as: Deltasone  Start taking on: January 7, 2025      Take 1 tablet (20 mg total) by mouth daily with breakfast for 5 days.   Stop taking on: January 12, 2025  Quantity: 5 tablet  Refills: 0            CONTINUE taking these medications        Instructions Prescription details   acetaminophen 500 MG Tabs  Commonly known as: Tylenol Extra Strength      Take 2 tablets (1,000 mg total) by mouth every 6 (six) hours as needed for Pain.   Refills: 0     albuterol 108 (90 Base) MCG/ACT Aers  Commonly known as: Ventolin HFA      Inhale 2 puffs into the lungs every 6 (six) hours as needed for Wheezing or Shortness of Breath.   Refills: 0     alendronate 70 MG Tabs  Commonly known as: Fosamax      Take 1 tablet (70 mg total) by mouth every 7 days.   Quantity: 12 tablet  Refills: 3     aspirin 81 MG Tbec      Take 1 tablet (81 mg total) by mouth daily.   Refills: 0     Comfort Protect Adult Diaper/L Misc      1 each in the morning, at noon, and at  bedtime.   Quantity: 270 each  Refills: 11     ergocalciferol 1.25 MG (51567 UT) Caps  Commonly known as: Vitamin D2      TOME GINNY CAPSULA POR VIA ORAL GINNY VES POR SEMANA   Quantity: 12 capsule  Refills: 3     Fenofibrate 160 MG Tabs      Take 1 tablet (160 mg total) by mouth daily.   Quantity: 90 tablet  Refills: 3     hydrOXYzine Pamoate 25 MG Caps  Commonly known as: VISTARIL      Take 1 capsule (25 mg total) by mouth 3 (three) times daily as needed for Itching.   Quantity: 90 capsule  Refills: 0     levothyroxine 137 MCG Tabs  Commonly known as: Synthroid      Take 137 mcg by mouth before breakfast.   Quantity: 90 tablet  Refills: 2     oseltamivir 75 MG Caps  Commonly known as: Tamiflu      Take 1 capsule (75 mg total) by mouth 2 (two) times daily for 2 days.   Stop taking on: January 8, 2025  Quantity: 4 capsule  Refills: 0     Ozempic (0.25 or 0.5 MG/DOSE) 2 MG/3ML Sopn  Generic drug: semaglutide      INYECTE 0.5 MG INTO THE SKIN GINNY VEZ POR SEMANA   Quantity: 6 mL  Refills: 1     pantoprazole 40 MG Tbec  Commonly known as: Protonix      Take 1 tablet (40 mg total) by mouth before breakfast.   Quantity: 90 tablet  Refills: 3     propranolol 40 MG Tabs  Commonly known as: Inderal      Take 1 tablet (40 mg total) by mouth 2 (two) times daily.   Quantity: 180 tablet  Refills: 3     sertraline 50 MG Tabs  Commonly known as: Zoloft      Take 1.5 tablets (75 mg total) by mouth daily.   Quantity: 135 tablet  Refills: 1     simvastatin 40 MG Tabs  Commonly known as: Zocor      Take 1 tablet (40 mg total) by mouth nightly.   Quantity: 90 tablet  Refills: 3               Where to Get Your Medications        These medications were sent to [facility]/pharmacy #[id number] - [place], [place] - [address] [facility], [phone], [phone]  [address] [place] [address]      Hours: 24-hours Phone: [phone]   guaiFENesin 100 MG/5 ML  oseltamivir 75 MG Caps  predniSONE 20 MG Tabs         Follow up:        Follow up Labs and imaging:         Time spent:  > 30 minutes    DILLAN ELENA MD  1/6/2025

## 2025-01-06 NOTE — PLAN OF CARE
Problem: Patient Centered Care  Goal: Patient preferences are identified and integrated in the patient's plan of care  Description: Interventions:  - What would you like us to know as we care for you?   - Provide timely, complete, and accurate information to patient/family  - Incorporate patient and family knowledge, values, beliefs, and cultural backgrounds into the planning and delivery of care  - Encourage patient/family to participate in care and decision-making at the level they choose  - Honor patient and family perspectives and choices  Outcome: Completed     Problem: DISCHARGE PLANNING  Goal: Discharge to home or other facility with appropriate resources  Description: INTERVENTIONS:  - Identify barriers to discharge w/pt and caregiver  - Include patient/family/discharge partner in discharge planning  - Arrange for needed discharge resources and transportation as appropriate  - Identify discharge learning needs (meds, wound care, etc)  - Arrange for interpreters to assist at discharge as needed  - Consider post-discharge preferences of patient/family/discharge partner  - Complete POLST form as appropriate  - Assess patient's ability to be responsible for managing their own health  - Refer to Case Management Department for coordinating discharge planning if the patient needs post-hospital services based on physician/LIP order or complex needs related to functional status, cognitive ability or social support system  Outcome: Completed     Problem: RESPIRATORY - ADULT  Goal: Achieves optimal ventilation and oxygenation  Description: INTERVENTIONS:  - Assess for changes in respiratory status  - Assess for changes in mentation and behavior  - Position to facilitate oxygenation and minimize respiratory effort  - Oxygen supplementation based on oxygen saturation or ABGs  - Provide Smoking Cessation handout, if applicable  - Encourage broncho-pulmonary hygiene including cough, deep breathe, Incentive Spirometry  -  Assess the need for suctioning and perform as needed  - Assess and instruct to report SOB or any respiratory difficulty  - Respiratory Therapy support as indicated  - Manage/alleviate anxiety  - Monitor for signs/symptoms of CO2 retention  Outcome: Completed

## 2025-01-06 NOTE — PROGRESS NOTES
[address] [address]      Date: 1/6/2025       Patient Name: Lena Macedo      To Whom it may concern:    This letter has been written at the patient's request.  Lena Macedo  was admitted in hospital from 1/2/2025 to 1/6/2025 for treatment of a medical condition. Please excuse Lena Macedo  from 1/6/2025 through 1/13/2025.      Lena Macedo  will follow up with his regular primary care physician Margaret Correa MD after discharge . Please communicate with patient's  regular primary care physician Margaret Correa MD for any release to work or  work restrictions or paperwork after released from hospital.      Thank you,    Pretty Garcia MD.   [facility]  1/6/2025  [phone]

## 2025-01-07 ENCOUNTER — PATIENT OUTREACH (OUTPATIENT)
Dept: CASE MANAGEMENT | Age: 65
End: 2025-01-07

## 2025-01-07 NOTE — PROGRESS NOTES
NCM attempted to reach the patient to complete a [facility] follow up call. Left message to call back. [facility] provided direct contact info at [phone].

## 2025-01-08 ENCOUNTER — TELEPHONE (OUTPATIENT)
Dept: PULMONOLOGY | Facility: CLINIC | Age: 65
End: 2025-01-08

## 2025-01-08 ENCOUNTER — TELEPHONE (OUTPATIENT)
Dept: FAMILY MEDICINE CLINIC | Facility: CLINIC | Age: 65
End: 2025-01-08

## 2025-01-08 ENCOUNTER — HOSPITAL ENCOUNTER (OUTPATIENT)
Dept: CT IMAGING | Facility: HOSPITAL | Age: 65
Discharge: HOME | End: 2025-01-08
Attending: INTERNAL MEDICINE

## 2025-01-08 DIAGNOSIS — Z13.6 SCREENING FOR CARDIOVASCULAR CONDITION: ICD-10-CM

## 2025-01-08 RX ORDER — LEVOTHYROXINE SODIUM 137 UG/1
137 TABLET ORAL
Qty: 90 TABLET | Refills: 3 | Status: SHIPPED | OUTPATIENT
Start: 2025-01-08

## 2025-01-08 NOTE — PROGRESS NOTES
RN Note: Pt experiencing shortness of breath and coughing frequently while doing CT Calcium Score (Heart Scan). Verbalized to do nurse consult via telephone call with help of .    Prior to pt going home, Preliminary Result (Calcium Score) discussed with this RN writer. And she was given both Cypriot and English handouts for Cholesterol, Hypertension, Diabetes, and Controlling Risk Factors.

## 2025-01-08 NOTE — PAYOR COMM NOTE
Discharge Notification    Patient Name: AIDEN HAAS  Payor: [facility] MEDICARE ADV PPO  Subscriber #: [id number]  Authorization Number: [id number]  Admit Date/Time: 1/2/2025 9:08 AM  Discharge Date/Time: 1/6/2025 4:41 PM

## 2025-01-08 NOTE — TELEPHONE ENCOUNTER
Call center can you please assist with appointment. OK to use virtual slot on 1/15/25 for an in person office visit with .

## 2025-01-08 NOTE — PAYOR COMM NOTE
--------------  CONTINUED STAY REVIEW    Payor: [facility] MEDICARE ADV PPO  Subscriber #:  [id number]  Authorization Number: [id number]    Admit date: 25  Admit time:  2:09 PM    REVIEW DOCUMENTATION: -DC      [facility]  part of [facility]     Progress Note           Lena Macedo Patient Status:  Inpatient    3/10/1960 MRN [id number]   Location [facility] 5SW/SE Attending Pretty Garcia MD   Hosp Day # 2 PCP Margaret Correa MD         Subjective:   Lena Macedo is a(n) 64 year old female who still has a productive cough. No fever. Having diarrhea.      Objective:   Blood pressure 133/63, pulse 59, temperature 98.3 °F (36.8 °C), temperature source Oral, resp. rate 18, weight 223 lb 12.3 oz (101.5 kg), SpO2 92%.     Physical Exam:    General: No acute distress.   Respiratory: Clear to auscultation bilaterally. No wheezes. No rhonchi.  Cardiovascular: S1, S2. Regular rate and rhythm. No murmurs, rubs or gallops.   Abdomen: Soft, nontender, nondistended.  Positive bowel sounds. No rebound or guarding.  Neurologic: No focal neurological deficits.   Musculoskeletal: Moves all extremities.  Extremities: No edema.     Results:            Lab Results   Component Value Date     WBC 4.8 2025     HGB 11.9 (L) 2025     HCT 37.9 2025     .0 2025     CREATSERUM 0.73 2025     BUN 15 2025      2025     K 3.8 2025      2025     CO2 26.0 2025      (H) 2025     CA 9.6 2025     ALB 4.4 2025     ALKPHO 43 (L) 2025     BILT 0.2 2025     TP 7.4 2025     AST 62 (H) 2025     ALT 54 (H) 2025     PTT 25.3 10/28/2018     INR 1.1 10/28/2018     T4F 1.1 2020     TSH 1.036 2024     DDIMER 0.89 (H) 10/28/2018     CRP 0.6 2014     MG 1.9 2025     TROP <0.045 2019     B12 1,486 (H) 2022         XR CHEST AP PORTABLE  (CPT=71045)      Result Date: 2025  CONCLUSION:        Cardiac enlargement with secondary signs of slight fluid overload.    Dictated by (CST): Pedro Pride MD on 2025 at 9:46 AM     Finalized by (CST): Pedro Pride MD on 2025 at 9:47 AM             Scheduled Medications    fenofibrate micronized  67 mg Oral Daily with breakfast    levothyroxine  137 mcg Oral Before breakfast    pantoprazole  40 mg Oral Before breakfast    propranolol  40 mg Oral BID    sertraline  50 mg Oral Daily    pravastatin  10 mg Oral Nightly    methylPREDNISolone  40 mg Intravenous Q12H    insulin aspart  1-11 Units Subcutaneous TID CC and HS    enoxaparin  0.5 mg/kg Subcutaneous BID    oseltamivir  75 mg Oral BID    albuterol  2.5 mg Nebulization 4 times per day           PRN Medications     hydrOXYzine    ipratropium-albuterol    glucose **OR** glucose **OR** glucose-vitamin C **OR** dextrose **OR** glucose **OR** glucose **OR** glucose-vitamin C    acetaminophen    ondansetron    metoclopramide    temazepam    guaiFENesin    benzonatate           Assessment and Plan:     Acute exacerbation of chronic obstructive pulmonary disease (COPD) (HCC)  - Continue on IV Solumedrol 40 mg daily  - duonebs PRN  - Pulm on board        Influenza A  - Contine on tamiflu  - home when ok with Pulm      Diarrhea  - stool for Cdiff      Hypothyroidism  Major depression  Dyslipidemia  - conitnue home meds     DM2  - SSI low dose  - accuchecks AC and HS           Quality:  DVT Prophylaxis: Lovenox  CODE status: Full     MDM.high     DILLAN ELENA MD  25 Hospitalist Progress Note   [facility]  part of [facility]     Progress Note           Lena Macedo Patient Status:  Inpatient    3/10/1960 MRN [id number]   Location [facility] 5SW/SE Attending Dillan Elena MD   Hosp Day # 3 PCP Margaret Correa MD         Subjective:   Lena Macedo is feeling a little better. Still doesn't feel well enough to go  home today.     Objective:   Blood pressure 146/77, pulse 61, temperature 97.8 °F (36.6 °C), temperature source Oral, resp. rate 18, weight 223 lb 12.3 oz (101.5 kg), SpO2 91%.     Physical Exam:    General: No acute distress.   Respiratory: Clear to auscultation bilaterally. No wheezes. No rhonchi.  Cardiovascular: S1, S2. Regular rate and rhythm. No murmurs, rubs or gallops.   Abdomen: Soft, nontender, nondistended.  Positive bowel sounds. No rebound or guarding.  Neurologic: No focal neurological deficits.   Musculoskeletal: Moves all extremities.  Extremities: No edema.     Results:            Lab Results   Component Value Date     WBC 11.1 (H) 01/05/2025     HGB 11.7 (L) 01/05/2025     HCT 34.3 (L) 01/05/2025     .0 01/05/2025     CREATSERUM 0.67 01/05/2025     BUN 20 01/05/2025      01/05/2025     K 3.6 01/05/2025      01/05/2025     CO2 28.0 01/05/2025      (H) 01/05/2025     CA 9.1 01/05/2025     ALB 4.4 01/02/2025     ALKPHO 43 (L) 01/02/2025     BILT 0.2 01/02/2025     TP 7.4 01/02/2025     AST 62 (H) 01/02/2025     ALT 54 (H) 01/02/2025     PTT 25.3 10/28/2018     INR 1.1 10/28/2018     T4F 1.1 09/17/2020     TSH 1.036 11/01/2024     DDIMER 0.89 (H) 10/28/2018     CRP 0.6 04/07/2014     MG 1.9 01/03/2025     TROP <0.045 04/28/2019     B12 1,486 (H) 07/18/2022         No results found.      Scheduled Medications    predniSONE  20 mg Oral Daily with breakfast    enoxaparin  50 mg Subcutaneous BID    fenofibrate micronized  67 mg Oral Daily with breakfast    levothyroxine  137 mcg Oral Before breakfast    pantoprazole  40 mg Oral Before breakfast    propranolol  40 mg Oral BID    sertraline  50 mg Oral Daily    pravastatin  10 mg Oral Nightly    insulin aspart  1-11 Units Subcutaneous TID CC and HS    oseltamivir  75 mg Oral BID    albuterol  2.5 mg Nebulization 4 times per day           PRN Medications     hydrOXYzine    ipratropium-albuterol    glucose **OR** glucose **OR**  glucose-vitamin C **OR** dextrose **OR** glucose **OR** glucose **OR** glucose-vitamin C    acetaminophen    ondansetron    metoclopramide    temazepam    guaiFENesin    benzonatate           Assessment and Plan:     Acute exacerbation of chronic obstructive pulmonary disease (COPD) (HCC)  - Continue on IV Solumedrol 40 mg daily- change to prednisone 20 mg daily   - duonebs PRN  - Pulm on board        Influenza A  - Contine on tamiflu  - home when ok with Pulm      Diarrhea  - stool for Cdiff      Hypothyroidism  Major depression  Dyslipidemia  - conitnue home meds     DM2  - SSI low dose  - accuchecks AC and HS           Quality:  DVT Prophylaxis: Lovenox  CODE status: Full     MDM.high     PRETTY ELENA MD  01/05/25 1/6/2025 Hospitalist Progress Note   [address] [address]        Date: 1/6/2025         Patient Name: Lena Macedo        To Whom it may concern:     This letter has been written at the patient's request.  Lena Macedo  was admitted in hospital from 1/2/2025 to 1/6/2025 for treatment of a medical condition. Please excuse Lena Macedo  from 1/6/2025 through 1/13/2025.        Lena Macedo  will follow up with his regular primary care physician Margaret Correa MD after discharge . Please communicate with patient's  regular primary care physician Margaret Correa MD for any release to work or  work restrictions or paperwork after released from hospital.        Thank you,     Pretty Elena MD.   [place] Hospitalist  1/6/2025          MEDICATIONS ADMINISTERED IN LAST 1 DAY:  Administration History       None            Vitals (last day) before discharge       Date/Time Temp Pulse Resp BP SpO2 Weight O2 Device O2 Flow Rate (L/min) [facility]    01/06/25 1120 -- 60 -- -- -- -- -- --     01/06/25 1034 -- -- -- -- 95 % -- None (Room air) -- SB    01/06/25  0956 -- 56 -- -- -- -- -- --     01/06/25 0950 97.8 °F (36.6 °C) 68 18 129/62 90 % -- None (Room air) --     01/06/25 0535 98.1 °F (36.7 °C) 51 16 157/75 94 % -- None (Room air) --     01/06/25 0330 -- -- -- -- 95 % -- -- --     01/05/25 2300 -- 59 -- -- 91 % -- -- --     01/05/25 2232 97.4 °F (36.3 °C) 55 18 124/60 90 % -- -- -- VA    01/05/25 1900 -- 58 -- -- -- -- -- --     01/05/25 1526 97.4 °F (36.3 °C) 58 18 127/68 92 % -- None (Room air) -- VA    01/05/25 1337 -- 61 -- -- 91 % -- None (Room air) -- VA    01/05/25 0933 97.8 °F (36.6 °C) 56 18 146/77 92 % -- None (Room air) -- [facility]    01/05/25 0655 97.9 °F (36.6 °C) 58 18 153/73 96 % -- Nasal cannula 2 L/min     01/05/25 0153 -- 50 -- -- 100 % -- -- -- EL          [place] Scores (last 5 days) before discharge       None

## 2025-01-08 NOTE — TELEPHONE ENCOUNTER
Refill passes per [facility] protocol.     No future appointments with primary care (internal medication or family medicine)    Requested Prescriptions   Pending Prescriptions Disp Refills    LEVOTHYROXINE 137 MCG Oral Tab [Pharmacy Med Name: LEVOTHYROXINE 137 MCG TABLET] 90 tablet 2     Sig: TOME GINNY TABLETA POR VIA ORAL BEFORE BREAKFAST       Thyroid Medication Protocol Passed - 1/8/2025 11:43 AM        Passed - TSH in past 12 months        Passed - Last TSH value is normal     Lab Results   Component Value Date    TSH 1.036 11/01/2024                 Passed - In person appointment or virtual visit in the past 12 mos or appointment in next 3 mos     Recent Outpatient Visits              1 week ago Influenza-like illness    [facility], [facility], [facility] Elsie Infante APRN    Office Visit    2 months ago Diabetes mellitus type 2 in nonobese ([facility])    [facility]    [facility]    6 months [facility]    [facility]    7 months ago [facility]    Office Visit    9 months ago Diabetes mellitus type 2 in nonobese ([facility]    [facility]          Future Appointments         Provider Department Appt Notes    Today [facility] RN RADIOLOGY 1 CALCIUM SCORE; [facility] CT [address] [place] [facility] q[facility] j Pt request a call @ [phone] if earlier appt becomes avaliable..    In 2 months Osei Anton MD [facility] Same as last visit                    Passed - Medication is active on med list             Future Appointments         Provider Department Appt Notes    Today [facility] RN RADIOLOGY 1 CALCIUM SCORE; [facility] CT RM1  [facility] qa jg Pt request a call @ [phone] if earlier appt becomes avaliable..    In 2 months Osei Anton MD [facility] Same as last visit          Recent Outpatient Visits              1 week ago Influenza-like illness    [facility], [facility], [facility] Elsie Infante, MARY ANN    Office Visit    2 months ago Diabetes mellitus type 2 in nonobese (HCC)    [facility], Margaret Dominguez MD    Office Visit    6 months ago Abnormal EKG    [facility], Margaret Dominguez MD    Office Visit    7 months ago Screening for colon cancer    [facility], Margaret Dominguez MD    Office Visit    9 months ago Diabetes mellitus type 2 in nonobese ([facility])    [facility], Margaret Dominguez MD    Office Visit

## 2025-01-08 NOTE — TELEPHONE ENCOUNTER
You can book her next Wednesday somewhere   May use video visit and have her do office visit in [facility]

## 2025-01-08 NOTE — TELEPHONE ENCOUNTER
Dr. Correa, you have no openings next Wednesday 1/15/25, however, you do have virtual visit openings on Thursday 1/16/25. Should we use a virtual slot as office visit on Thursday 1/16/25 instead?

## 2025-01-08 NOTE — TELEPHONE ENCOUNTER
Patient's daughter called to request an appointment for a hospital follow up, next available is 1/28. Patient discharge instructions are to follow up with PCP within 1 week.    Patient's daughter scheduled for 1/28 just in case.

## 2025-01-09 NOTE — TELEPHONE ENCOUNTER
Spoke with patient states she will be leaving to [place] 01/14 due to a family emergency. No available appointments prior to 01/14 are we able to accommodate or should patient see another provider?

## 2025-01-09 NOTE — PROGRESS NOTES
Date of Service 1/8/2025    AIDEN HAAS  Date of Birth 3/10/1960    Patient Age: 64 year old    PCP: Margaret Correa MD  [address] [place] [id number]    Nurse Consult done via telephone call with help of , per pt request    Heart Scan Consult  Preliminary Heart Scan Score: 0    Previous Screening  Heart Scan Completed Previously: No        Peripheral Vascular Scan Completed Previously: No          Risk Factors  Personal Risk Factors  Non-alterable Risk Factors: Age;Gender  Alterable Risk Factors: Diabetes;Abnormal Cholesterol;Lack of exercise      Body Mass Index  There is no height or weight on file to calculate BMI.    Blood Pressure  There were no vitals taken for this visit.  (Normal =< 120/80,  Elevated = 120-129/ >80,  High Stage1 130-139/80-89 , Stage2 >140/>90)    Lipid Profile  Cholesterol: 160, done on 11/1/2024.  HDL Cholesterol: 47, done on 11/1/2024.  LDL Cholesterol: 89, done on 11/1/2024.  TriGlycerides 134, done on 11/1/2024.    Cholesterol Goals  Value   Total  =< 200   HDL  = > 45 Men = > 55 Women   LDL   =< 100   Triglycerides  =< 150       Glucose and Hemoglobin A1C  Lab Results   Component Value Date    PGLU 156 (H) 01/06/2025     (H) 01/06/2025    A1C 6.5 (A) 11/01/2024     (Normal Fasting Glucose < 100mg/dl )    Nurse Review  Risk factor information and results reviewed with Nurse: Yes    Recommended Follow Up:  Consult your physician regarding:: Final Heart Scan Report;Discuss Potential for Score Variance;Discuss potential for Incidental Finding;Exercise Program Clearance      Recommendations for Change:  Nutrition Changes: Low Saturated Fat;Low Fat Dairy;Low Salt Eating;Increase Fiber    Cholesterol Modification (goal of therapy depends upon your risk): No Change Needed    Exercise: Initiate Program (After getting clearance to exercise from doctor)    Smoking Cessation: No Change Needed (Not a smoker)    Weight Management: Decrease Current Weight          Repeat Heart Scan: Discuss with your Physician;5 years if Calcium Score is 0.0              [place] Recommended Resources:  Recommended Resources: Upcoming Classes, Medical Services and Health Library [email or web address];PV Screening  Recommended PV Screening: Abdomen;Carotids         Kathy CALVIN RN        Please Contact the Nurse Heart Line with any Questions or Concerns [phone].

## 2025-01-10 ENCOUNTER — OFFICE VISIT (OUTPATIENT)
Dept: FAMILY MEDICINE CLINIC | Facility: CLINIC | Age: 65
End: 2025-01-10

## 2025-01-10 VITALS
HEART RATE: 57 BPM | WEIGHT: 219 LBS | OXYGEN SATURATION: 98 % | HEIGHT: 61 IN | DIASTOLIC BLOOD PRESSURE: 65 MMHG | SYSTOLIC BLOOD PRESSURE: 113 MMHG | BODY MASS INDEX: 41.35 KG/M2

## 2025-01-10 DIAGNOSIS — J10.1 INFLUENZA A: ICD-10-CM

## 2025-01-10 DIAGNOSIS — G47.33 OSA ON CPAP: ICD-10-CM

## 2025-01-10 DIAGNOSIS — J11.1 INFLUENZAL BRONCHITIS: ICD-10-CM

## 2025-01-10 DIAGNOSIS — J44.1 ACUTE EXACERBATION OF CHRONIC OBSTRUCTIVE PULMONARY DISEASE (COPD) (HCC): Primary | ICD-10-CM

## 2025-01-10 RX ORDER — CODEINE PHOSPHATE AND GUAIFENESIN 10; 100 MG/5ML; MG/5ML
5 SOLUTION ORAL EVERY 6 HOURS PRN
Qty: 140 ML | Refills: 0 | Status: SHIPPED | OUTPATIENT
Start: 2025-01-10 | End: 2025-01-17

## 2025-01-10 RX ORDER — CODEINE PHOSPHATE AND GUAIFENESIN 10; 100 MG/5ML; MG/5ML
5 SOLUTION ORAL EVERY 6 HOURS PRN
Qty: 140 ML | Refills: 0 | Status: SHIPPED | OUTPATIENT
Start: 2025-01-10 | End: 2025-01-10

## 2025-01-10 RX ORDER — ALBUTEROL SULFATE 90 UG/1
2 INHALANT RESPIRATORY (INHALATION) EVERY 6 HOURS PRN
Qty: 1 EACH | Refills: 3 | Status: SHIPPED | OUTPATIENT
Start: 2025-01-10

## 2025-01-10 NOTE — TELEPHONE ENCOUNTER
Patient discharged on 1/6/25. Patient hospitalized for Acute exacerbation of chronic obstructive pulmonary disease (COPD) ([facility])  Active Problems:    Influenza A    Influenzal bronchitis    Dr Becker- when would you like to see patient back in office?

## 2025-01-10 NOTE — PROGRESS NOTES
Subjective:   Lena Macedo is a 64 year old female who presents for Hospital F/U (Pt was in the hospital for 4 days with Flu A, pt states she is feeling better but is still having cough and raspy throat. ).     Patient presents for follow-up after recent hospitalization on 01/02/2025. Patient was admitted for Acute Influenza A. Patient was discharged on 01/06/2025. Patient reports symptoms have improved.  She states that she only has a cough and after coughing she becomes short of breath. Patient continues to take medications that were given at discharge. She is traveling to [place] for 1 week and states will follow up with pulmonologist after coming back. Patient requesting referral for sleep medicine for reassessment on her sleep apnea.           History/Other:      Chief Complaint Reviewed and Verified  Nursing Notes Reviewed and   Verified  Tobacco Reviewed  Allergies Reviewed  Medications Reviewed    Problem List Reviewed  Medical History Reviewed  Surgical History   Reviewed  OB Status Reviewed  Family History Reviewed  Social History   Reviewed           Tobacco:  She has never smoked tobacco.      Current Outpatient Medications   Medication Sig Dispense Refill    albuterol 108 (90 Base) MCG/ACT Inhalation Aero Soln Inhale 2 puffs into the lungs every 6 (six) hours as needed for Wheezing or Shortness of Breath. 1 each 3    guaiFENesin-codeine 100-10 MG/5ML Oral Solution Take 5 mL by mouth every 6 (six) hours as needed for cough. 140 mL 0    levothyroxine 137 MCG Oral Tab Take 137 mcg by mouth before breakfast. 90 tablet 3    predniSONE 20 MG Oral Tab Take 1 tablet (20 mg total) by mouth daily with breakfast for 5 days. 5 tablet 0    acetaminophen 500 MG Oral Tab Take 2 tablets (1,000 mg total) by mouth every 6 (six) hours as needed for Pain.      semaglutide (OZEMPIC, 0.25 OR 0.5 MG/DOSE,) 2 MG/3ML Subcutaneous Solution Pen-injector INYECTE 0.5 MG INTO THE SKIN GINNY VEZ POR SEMANA 6 mL 1     hydrOXYzine Pamoate 25 MG Oral Cap Take 1 capsule (25 mg total) by mouth 3 (three) times daily as needed for Itching. 90 capsule 0    propranolol 40 MG Oral Tab Take 1 tablet (40 mg total) by mouth 2 (two) times daily. 180 tablet 3    aspirin 81 MG Oral Tab EC Take 1 tablet (81 mg total) by mouth daily.      alendronate (FOSAMAX) 70 MG Oral Tab Take 1 tablet (70 mg total) by mouth every 7 days. 12 tablet 3    Incontinence Supply Disposable (COMFORT PROTECT ADULT DIAPER/L) Does not apply Misc 1 each in the morning, at noon, and at bedtime. 270 each 11    pantoprazole 40 MG Oral Tab EC Take 1 tablet (40 mg total) by mouth before breakfast. 90 tablet 3    Fenofibrate 160 MG Oral Tab Take 1 tablet (160 mg total) by mouth daily. 90 tablet 3    ergocalciferol 1.25 MG (66798 UT) Oral Cap TOME GINNY CAPSULA POR VIA ORAL GINNY VES POR SEMANA 12 capsule 3    sertraline 50 MG Oral Tab Take 1.5 tablets (75 mg total) by mouth daily. 135 tablet 1    simvastatin 40 MG Oral Tab Take 1 tablet (40 mg total) by mouth nightly. 90 tablet 3       Allergies[1]        Review of Systems   Constitutional: Negative.  Negative for activity change and fatigue.   HENT:  Positive for sore throat. Negative for congestion, ear pain, rhinorrhea and sneezing.    Eyes: Negative.  Negative for redness.   Respiratory:  Positive for cough. Negative for shortness of breath and wheezing.    Cardiovascular: Negative.  Negative for chest pain.   Gastrointestinal: Negative.  Negative for abdominal pain, constipation, diarrhea, nausea and vomiting.   Endocrine: Negative.    Genitourinary: Negative.  Negative for difficulty urinating and frequency.   Musculoskeletal: Negative.  Negative for back pain, joint swelling and myalgias.   Skin: Negative.  Negative for rash.   Allergic/Immunologic: Negative.    Neurological: Negative.  Negative for dizziness, syncope, light-headedness and headaches.   Hematological: Negative.    Psychiatric/Behavioral: Negative.            Objective:   /65 (BP Location: Right arm, Patient Position: Sitting, Cuff Size: adult)   Pulse 57   Ht 5' 1\" (1.549 m)   Wt 219 lb (99.3 kg)   SpO2 98%   BMI 41.38 kg/m²  Estimated body mass index is 41.38 kg/m² as calculated from the following:    Height as of this encounter: 5' 1\" (1.549 m).    Weight as of this encounter: 219 lb (99.3 kg).      Physical Exam  Vitals and nursing note reviewed.   Constitutional:       Appearance: Normal appearance. She is normal weight.   HENT:      Head: Normocephalic and atraumatic.      Right Ear: Tympanic membrane normal.      Left Ear: Tympanic membrane normal.      Nose: Congestion and rhinorrhea present.      Mouth/Throat:      Pharynx: Posterior oropharyngeal erythema present.   Eyes:      Extraocular Movements: Extraocular movements intact.      Conjunctiva/sclera: Conjunctivae normal.      Pupils: Pupils are equal, round, and reactive to light.   Cardiovascular:      Rate and Rhythm: Normal rate and regular rhythm.      Pulses: Normal pulses.      Heart sounds: Normal heart sounds.   Pulmonary:      Effort: Pulmonary effort is normal.      Breath sounds: Wheezing present.   Abdominal:      General: Abdomen is flat. Bowel sounds are normal.      Palpations: Abdomen is soft.   Musculoskeletal:         General: Normal range of motion.      Cervical back: Normal range of motion and neck supple.   Skin:     General: Skin is warm and dry.   Neurological:      General: No focal deficit present.      Mental Status: She is alert and oriented to person, place, and time. Mental status is at baseline.   Psychiatric:         Mood and Affect: Mood normal.         Behavior: Behavior normal.         Thought Content: Thought content normal.         Judgment: Judgment normal.           Assessment & Plan:     1. Acute exacerbation of chronic obstructive pulmonary disease (COPD) ([facility])  - albuterol 108 (90 Base) MCG/ACT Inhalation Aero Soln; Inhale 2 puffs into the lungs  every 6 (six) hours as needed for Wheezing or Shortness of Breath.  Dispense: 1 each; Refill: 3    2. Influenzal bronchitis  - guaiFENesin-codeine 100-10 MG/5ML Oral Solution; Take 5 mL by mouth every 6 (six) hours as needed for cough.  Dispense: 140 mL; Refill: 0    3. Influenza A  - albuterol 108 (90 Base) MCG/ACT Inhalation Aero Soln; Inhale 2 puffs into the lungs every 6 (six) hours as needed for Wheezing or Shortness of Breath.  Dispense: 1 each; Refill: 3  - guaiFENesin-codeine 100-10 MG/5ML Oral Solution; Take 5 mL by mouth every 6 (six) hours as needed for cough.  Dispense: 140 mL; Refill: 0    4. GÉNESIS on CPAP  - Sleep Medicine - Reem Beckerly [facility]         Medication use, effects and side effects discussed in detail with patient. The patient  indicated understanding of the diagnosis and agreed with the plan of care.    Return in about 1 week (around 1/17/2025).    MARY ANN Henao       [1]   Allergies  Allergen Reactions    Advair Hfa OTHER (SEE COMMENTS)     Tingling sensation in throat

## 2025-01-16 NOTE — TELEPHONE ENCOUNTER
Scheduled patient for 1/28/25 at 11:45. Reviewed time, date, place and where to park. Patient's daughter verbalized understanding

## 2025-01-17 NOTE — PROGRESS NOTES
Patient went in for hospital follow-up appointment with primary care provider office on 1/10/25.  Encounter closing.

## 2025-01-28 ENCOUNTER — OFFICE VISIT (OUTPATIENT)
Dept: PULMONOLOGY | Facility: CLINIC | Age: 65
End: 2025-01-28

## 2025-01-28 VITALS
BODY MASS INDEX: 41.35 KG/M2 | HEART RATE: 55 BPM | DIASTOLIC BLOOD PRESSURE: 59 MMHG | OXYGEN SATURATION: 97 % | WEIGHT: 219 LBS | HEIGHT: 61 IN | SYSTOLIC BLOOD PRESSURE: 118 MMHG | RESPIRATION RATE: 16 BRPM

## 2025-01-28 DIAGNOSIS — E66.9 TYPE 2 DIABETES MELLITUS WITH OBESITY (HCC): ICD-10-CM

## 2025-01-28 DIAGNOSIS — E11.69 TYPE 2 DIABETES MELLITUS WITH OBESITY (HCC): ICD-10-CM

## 2025-01-28 DIAGNOSIS — E66.01 MORBID OBESITY WITH BMI OF 40.0-44.9, ADULT (HCC): ICD-10-CM

## 2025-01-28 DIAGNOSIS — I27.82 CHRONIC PULMONARY EMBOLISM WITHOUT ACUTE COR PULMONALE, UNSPECIFIED PULMONARY EMBOLISM TYPE (HCC): ICD-10-CM

## 2025-01-28 DIAGNOSIS — J44.1 ACUTE EXACERBATION OF CHRONIC OBSTRUCTIVE PULMONARY DISEASE (COPD) (HCC): Primary | ICD-10-CM

## 2025-01-28 DIAGNOSIS — I27.20 PULMONARY HYPERTENSION (HCC): ICD-10-CM

## 2025-01-28 DIAGNOSIS — G47.33 OSA ON CPAP: ICD-10-CM

## 2025-01-28 DIAGNOSIS — F31.9 BIPOLAR 1 DISORDER (HCC): ICD-10-CM

## 2025-01-28 PROCEDURE — 3078F DIAST BP <80 MM HG: CPT | Performed by: INTERNAL MEDICINE

## 2025-01-28 PROCEDURE — 3008F BODY MASS INDEX DOCD: CPT | Performed by: INTERNAL MEDICINE

## 2025-01-28 PROCEDURE — 99214 OFFICE O/P EST MOD 30 MIN: CPT | Performed by: INTERNAL MEDICINE

## 2025-01-28 PROCEDURE — 3074F SYST BP LT 130 MM HG: CPT | Performed by: INTERNAL MEDICINE

## 2025-01-28 NOTE — PROGRESS NOTES
The patient is a 64-year-old female who I know well from prior evaluation who comes in now for follow-up.  In general she notes that her breathing is much better.  She was hospitalized with asthma exacerbation with influenza A.  She is better in that regard.  She notes that her CPAP machine is defective and she therefore has not been able to use recently.  She remains on baby aspirin lifelong with a prior history of venous thromboembolism.    Review of Systems:  Vision normal. Ear nose and throat normal. Bowel normal. Bladder function normal. No depression. No thyroid disease. No lymphatic system concerns.  No rash. Muscles and joints unremarkable. No weight loss no weight gain.    Physical Examination:  Vital signs normal. HEENT examination is unremarkable with pupils equal round and reactive to light and accommodation. Neck without adenopathy, thyromegaly, JVD nor bruit. Lungs clear to auscultation and percussion. Cardiac regular rate and rhythm no murmur. Abdomen nontender, without hepatosplenomegaly and no mass appreciable. Extremities and Musculoskeletal without clubbing cyanosis nor edema, and mobility acceptable. Neurologic grossly intact with symmetric tone and strength and reflex.    Assessment and plan:  1.  History of venous thromboembolism-lifelong baby aspirin  2.  Asthma-acceptably controlled.  Continue current management with albuterol as needed.  3.  History of recent influenza A-resolved  4.  Sleep apnea-the patient's machine is broken and she needs new equipment.  Will order new CPAP device.  Patient to see me again in the office in 1 year or sooner if needed.  5.  History of pulmonary nodule-resolved on serial imaging.

## 2025-01-29 ENCOUNTER — TELEPHONE (OUTPATIENT)
Dept: PULMONOLOGY | Facility: CLINIC | Age: 65
End: 2025-01-29

## 2025-01-29 DIAGNOSIS — G47.33 OSA (OBSTRUCTIVE SLEEP APNEA): Primary | ICD-10-CM

## 2025-01-29 NOTE — TELEPHONE ENCOUNTER
Daughter is wondering if patient can get a order for a Bipap machine instead of Cpap please follow up and please call patient has additional questions about this

## 2025-02-03 NOTE — TELEPHONE ENCOUNTER
RN, okay to send an order to her equipment company to switch to BiPAP mode 8/4.  They may demand repeat titration.  But lets try to send the order and see if it works.

## 2025-02-03 NOTE — TELEPHONE ENCOUNTER
Dr. Becker-Spoke with daughter (Niya) states patient cannot tolerate CPAP feels like it's suffocating her and gets anxious. She was on bipap in hospital and felt it was easier to tolerate. Has hx of asthma with COPD. Requesting to change to from cpap to Bipap.

## 2025-02-06 NOTE — TELEPHONE ENCOUNTER
RN, please let the patient know that if she really wants to switch to BiPAP, which might not be all that helpful, she would have to undergo another titration.  May be more reasonable to stick with what she has.

## 2025-02-06 NOTE — TELEPHONE ENCOUNTER
Patient will need a BiPAP titration and a new BiPAP machine ordered. Current [employer]Station AutoCPAP can not be switched to BiPAP.

## 2025-02-07 NOTE — TELEPHONE ENCOUNTER
RN, I ordered a repeat CPAP titration with quick transition to BiPAP for comfort please let the patient know to call for appointment.

## 2025-02-07 NOTE — TELEPHONE ENCOUNTER
Discussed Dr. Becker's response below with patient's daughter. She states patient adamant to switch to BiPAP. Aware RN will follow up when titration ordered.

## 2025-02-20 ENCOUNTER — TELEPHONE (OUTPATIENT)
Dept: FAMILY MEDICINE CLINIC | Facility: CLINIC | Age: 65
End: 2025-02-20

## 2025-03-03 NOTE — TELEPHONE ENCOUNTER
Patient came to clinic if she could get a letter stating how long it would take to fill out FMLA that her employer is requesting. Patient stated she needs a letter before 3/05/25.

## 2025-03-04 ENCOUNTER — TELEPHONE (OUTPATIENT)
Dept: PULMONOLOGY | Facility: CLINIC | Age: 65
End: 2025-03-04

## 2025-03-04 NOTE — TELEPHONE ENCOUNTER
Received fax from [employer] stating \"Marking order lost- Patient states having a titration\". Will follow up during office visit 6/5/25 after having titration 3/24/25.

## 2025-03-06 NOTE — TELEPHONE ENCOUNTER
Patient called asking about letter which she requested from the doctors office. I was letting patient know the forms department doesn't provide letter to patients and the line was disconnected. Please assist patient when she calls back.

## 2025-03-14 ENCOUNTER — OFFICE VISIT (OUTPATIENT)
Dept: SLEEP CENTER | Age: 65
End: 2025-03-14
Attending: INTERNAL MEDICINE
Payer: MEDICARE

## 2025-03-14 DIAGNOSIS — G47.33 OSA (OBSTRUCTIVE SLEEP APNEA): Primary | ICD-10-CM

## 2025-03-14 PROCEDURE — 95811 POLYSOM 6/>YRS CPAP 4/> PARM: CPT

## 2025-03-17 ENCOUNTER — ORDER TRANSCRIPTION (OUTPATIENT)
Dept: SLEEP CENTER | Age: 65
End: 2025-03-17

## 2025-03-17 DIAGNOSIS — G47.33 OBSTRUCTIVE SLEEP APNEA (ADULT) (PEDIATRIC): Primary | ICD-10-CM

## 2025-03-21 ENCOUNTER — OFFICE VISIT (OUTPATIENT)
Dept: SURGERY | Facility: CLINIC | Age: 65
End: 2025-03-21
Payer: COMMERCIAL

## 2025-03-21 VITALS
HEART RATE: 80 BPM | WEIGHT: 215 LBS | OXYGEN SATURATION: 99 % | DIASTOLIC BLOOD PRESSURE: 70 MMHG | HEIGHT: 62 IN | SYSTOLIC BLOOD PRESSURE: 118 MMHG | RESPIRATION RATE: 16 BRPM | BODY MASS INDEX: 39.56 KG/M2

## 2025-03-21 DIAGNOSIS — E66.9 OBESITY (BMI 30-39.9): ICD-10-CM

## 2025-03-21 DIAGNOSIS — E78.5 DYSLIPIDEMIA: ICD-10-CM

## 2025-03-21 DIAGNOSIS — G47.33 OSA ON CPAP: ICD-10-CM

## 2025-03-21 DIAGNOSIS — E11.9 TYPE 2 DIABETES MELLITUS WITHOUT COMPLICATION, WITHOUT LONG-TERM CURRENT USE OF INSULIN (HCC): Primary | ICD-10-CM

## 2025-03-21 RX ORDER — PHENTERMINE HYDROCHLORIDE 8 MG/1
1 TABLET ORAL DAILY
Qty: 30 TABLET | Refills: 5 | Status: SHIPPED | OUTPATIENT
Start: 2025-03-21 | End: 2025-04-20

## 2025-03-21 NOTE — PROGRESS NOTES
[facility], [facility], [place]  [address] [address]  Dept: [phone]       Patient:  Lena Macedo  :      3/10/1960  MRN:      [id number]    Chief Complaint:    Chief Complaint   Patient presents with    Follow - Up    Weight Management       SUBJECTIVE     History of Present Illness:  Lena is being seen today for a follow-up for non surgical weight loss.     Past Medical History:   Past Medical History:    Anxiety state    Arthritis    Asthma (HCC)    Chronic pulmonary embolism without acute cor pulmonale (HCC)    Class 2 obesity due to excess calories without serious comorbidity with body mass index (BMI) of 39.0 to 39.9 in adult    Depression    Diabetes (HCC)    Disorder of thyroid    Esophageal reflux    Fibromyalgia    Hearing impairment    left ear    High blood pressure    High cholesterol    Hyperlipidemia    Hyperthyroidism    Insulin resistance    Morbid obesity with BMI of 40.0-44.9, adult (HCC)    Osteoarthritis    Pneumonia due to organism    Prediabetes    Pulmonary embolism (HCC)    Sleep apnea    Visual impairment    wears glasses        Comorbidities:  Back pain-Improvement?  yes, Joint pain-Improvement?  yes and Sleep apnea-Improvement?  yes    OBJECTIVE     Vitals: /70   Pulse 80   Resp 16   Ht 5' 2\" (1.575 m)   Wt 215 lb (97.5 kg)   SpO2 99%   BMI 39.32 kg/m²     Initial weight loss: +08   Total weight loss: -14   Start weight: 230    Wt Readings from Last 3 Encounters:   25 215 lb (97.5 kg)   25 219 lb (99.3 kg)   01/10/25 219 lb (99.3 kg)       Patient Medications:    Current Outpatient Medications   Medication Sig Dispense Refill    albuterol 108 (90 Base) MCG/ACT Inhalation Aero Soln Inhale 2 puffs into the lungs every 6 (six) hours as needed for Wheezing or Shortness of Breath. 1 each 3    levothyroxine 137 MCG Oral Tab Take 137 mcg by mouth before breakfast. 90 tablet 3    acetaminophen  500 MG Oral Tab Take 2 tablets (1,000 mg total) by mouth every 6 (six) hours as needed for Pain.      semaglutide (OZEMPIC, 0.25 OR 0.5 MG/DOSE,) 2 MG/3ML Subcutaneous Solution Pen-injector INYECTE 0.5 MG INTO THE SKIN GINNY VEZ POR SEMANA 6 mL 1    hydrOXYzine Pamoate 25 MG Oral Cap Take 1 capsule (25 mg total) by mouth 3 (three) times daily as needed for Itching. 90 capsule 0    propranolol 40 MG Oral Tab Take 1 tablet (40 mg total) by mouth 2 (two) times daily. 180 tablet 3    aspirin 81 MG Oral Tab EC Take 1 tablet (81 mg total) by mouth daily.      alendronate (FOSAMAX) 70 MG Oral Tab Take 1 tablet (70 mg total) by mouth every 7 days. 12 tablet 3    Incontinence Supply Disposable (COMFORT PROTECT ADULT DIAPER/L) Does not apply Misc 1 each in the morning, at noon, and at bedtime. 270 each 11    pantoprazole 40 MG Oral Tab EC Take 1 tablet (40 mg total) by mouth before breakfast. 90 tablet 3    Fenofibrate 160 MG Oral Tab Take 1 tablet (160 mg total) by mouth daily. 90 tablet 3    ergocalciferol 1.25 MG (83955 UT) Oral Cap TOME GINNY CAPSULA POR VIA ORAL GINNY VES POR SEMANA 12 capsule 3    sertraline 50 MG Oral Tab Take 1.5 tablets (75 mg total) by mouth daily. 135 tablet 1    simvastatin 40 MG Oral Tab Take 1 tablet (40 mg total) by mouth nightly. 90 tablet 3     Allergies:  Advair hfa     Social History:    Social History     Socioeconomic History    Marital status:      Spouse name: Not on file    Number of children: Not on file    Years of education: Not on file    Highest education level: Not on file   Occupational History    Not on file   Tobacco Use    Smoking status: Never     Passive exposure: Never    Smokeless tobacco: Never   Vaping Use    Vaping status: Never Used   Substance and Sexual Activity    Alcohol use: No    Drug use: No    Sexual activity: Not on file   Other Topics Concern     Service Not Asked    Blood Transfusions Not Asked    Caffeine Concern No    Occupational Exposure Not  Asked    Hobby Hazards Not Asked    Sleep Concern Not Asked    Stress Concern Not Asked    Weight Concern Not Asked    Special Diet Not Asked    Back Care Not Asked    Exercise Not Asked    Bike Helmet Not Asked    Seat Belt Not Asked    Self-Exams Not Asked   Social History Narrative    Not on file     Social Drivers of Health     Food Insecurity: No Food Insecurity (1/2/2025)    Food Insecurity     Food Insecurity: Never true   Transportation Needs: No Transportation Needs (1/2/2025)    Transportation Needs     Lack of Transportation: No     Car Seat: Not on file   Stress: Not on file   Housing Stability: Low Risk  (1/2/2025)    Housing Stability     Housing Instability: No     Housing Instability Emergency: Not on file     Crib or Bassinette: Not on file     Surgical History:    Past Surgical History:   Procedure Laterality Date    Cholecystectomy      Colonoscopy N/A 6/17/2024    Procedure: COLONOSCOPY;  Surgeon: Marcela Juan MD;  Location: [facility] ENDOSCOPY    Needle biopsy left Left     [place]    Other surgical history      gall stones     Family History:    Family History   Problem Relation Age of Onset    Stroke Mother         CVA    Diabetes Mother     Hypertension Father     Neurological Disorder Father         parkinson's disesae    Arthritis Father     Lipids Father         hyperlipidemia    Cancer Neg     Heart Disease Neg         CAD    Breast Cancer Neg     Ovarian Cancer Neg        Food Journal  Reviewed and Discussed:       Patient has a Food Journal?: yes   Patient is reading nutrition labels?  yes  Average Caloric Intake:     Average CHO Intake: 160  Is patient exercising? no  Type of exercise?     Eating Habits  Patient states the following:  Eats 2 meal(s) per day  Length of time it takes to consume a meal:  20  # of snacks per day: 1 Type of snacks:  fruit  Amount of soda consumption per day:    Amount of water (in ounces) per day:  64  Drinking between meals only:   yes  Toughest challenge:  Sweet tooth    Nutritional Goals  Limit carbohydrates to 100 gms per day, Eat 100-200 calories within 1 hour of waking  and Eat 3-4 cups of fresh fruits or vegetables daily    Behavior Modifications Reviewed and Discussed  Eat breakfast, Eat 3 meals per day, Plan meals in advance, Read nutrition labels, Drink 64 oz of water per day, Maintain a daily food journal, No drinking 30 minutes before or after meals, Utlize portion control strategies to reduce calorie intake, Identify triggers for eating and manage cues and Eat slowly and take 20 to 30 minutes to complete each meal    Exercise Goals Reviewed and Discussed    Needs to increase activity     ROS:    Constitutional: negative  Respiratory: negative  Cardiovascular: negative  Gastrointestinal: negative  Musculoskeletal:negative  Neurological: negative  Behavioral/Psych: negative  Endocrine: negative  All other systems were reviewed and are negative    Physical Exam:   General appearance: alert, appears stated age, cooperative and moderately obese  Head: Normocephalic, without obvious abnormality, atraumatic  Lungs: clear to auscultation bilaterally  Heart: S1, S2 normal, no murmur, click, rub or gallop, regular rate and rhythm  Abdomen:  soft, obese, non tender  Extremities: edema trace  Pulses: 2+ and symmetric  Skin: Skin color, texture, turgor normal. No rashes or lesions  Neurologic: Grossly normal    ASSESSMENT     OBSTRUCTIVE SLEEP APNEA: The patient states her sleep apnea has been stable since the last clinic visit. There has not been any increase in hyper-somnolence.     Encounter Diagnosis(ses):   Encounter Diagnoses   Name Primary?    Type 2 diabetes mellitus without complication, without long-term current use of insulin (HCC) Yes    GÉNESIS on CPAP     Dyslipidemia     Obesity (BMI 30-39.9)        PLAN     Patient is not interested in bariatric surgery. Patient desires to pursue traditional weight loss at this time.      Patient  was instructed to continue wearing their CPaP as recommended.     DYSLIPIDEMIA: Stable on the above prescribed meal plan and medication. Liver function stable.    Lab Results   Component Value Date/Time    CHOLEST 160 11/01/2024 11:37 AM    LDL 89 11/01/2024 11:37 AM    HDL 47 11/01/2024 11:37 AM    TRIG 134 11/01/2024 11:37 AM    VLDL 22 11/01/2024 11:37 AM     Fatty liver: may improve with diet and exercise      Goals for next month:  1. Keep a food log.  2. Drink 48-64 ounces of non-caloric beverages per day. No fruit juices or regular soda.  3. Increase activity-upper body exercises, walk 10 minutes per day.  4. Increase fruit and vegetable servings to 5-6 per day.      Topiramate caused fatigue    Ozempic per PCP    PHENTERMINE: tolerating 8 mg   EKG done    Hair loss noted  Increase protein intake  May take biotin      Diagnoses and all orders for this visit:    Type 2 diabetes mellitus without complication, without long-term current use of insulin (HCC)    GÉNESIS on CPAP    Dyslipidemia    Obesity (BMI 30-39.9)          Osei Anton MD

## 2025-03-24 ENCOUNTER — TELEPHONE (OUTPATIENT)
Dept: PULMONOLOGY | Facility: CLINIC | Age: 65
End: 2025-03-24

## 2025-03-24 DIAGNOSIS — G47.33 OSA (OBSTRUCTIVE SLEEP APNEA): Primary | ICD-10-CM

## 2025-03-24 NOTE — TELEPHONE ENCOUNTER
----- Message from Dave Becker sent at 3/19/2025  2:22 PM CDT -----  RN, please call the patient and facilitate set up of BiPAP 17/13 CWP.  Appropriate follow-up.  Chavo AVILA

## 2025-03-25 ENCOUNTER — TELEPHONE (OUTPATIENT)
Dept: SURGERY | Facility: CLINIC | Age: 65
End: 2025-03-25

## 2025-03-25 ENCOUNTER — OFFICE VISIT (OUTPATIENT)
Dept: FAMILY MEDICINE CLINIC | Facility: CLINIC | Age: 65
End: 2025-03-25

## 2025-03-25 VITALS
HEIGHT: 62 IN | OXYGEN SATURATION: 96 % | TEMPERATURE: 97 F | DIASTOLIC BLOOD PRESSURE: 65 MMHG | SYSTOLIC BLOOD PRESSURE: 109 MMHG | WEIGHT: 216 LBS | RESPIRATION RATE: 20 BRPM | BODY MASS INDEX: 39.75 KG/M2 | HEART RATE: 61 BPM

## 2025-03-25 DIAGNOSIS — E11.9 DIABETES MELLITUS TYPE 2 IN NONOBESE (HCC): Primary | ICD-10-CM

## 2025-03-25 RX ORDER — BETAMETHASONE DIPROPIONATE 0.5 MG/ML
1 LOTION, AUGMENTED TOPICAL 2 TIMES DAILY PRN
Qty: 30 ML | Refills: 1 | Status: SHIPPED | OUTPATIENT
Start: 2025-03-25

## 2025-03-25 RX ORDER — CEPHALEXIN 500 MG/1
500 CAPSULE ORAL 2 TIMES DAILY
Qty: 14 CAPSULE | Refills: 0 | Status: SHIPPED | OUTPATIENT
Start: 2025-03-25

## 2025-03-25 NOTE — TELEPHONE ENCOUNTER
AIDEN HAAS (Key: [id number])  PA Case ID #: [id number]  Rx #: [id number]  Need Help? Call us at [phone]    Drug  Lomaira 8MG tablets    Form  [employer] Electronic PA Form (2017 NCPDP)  Original Claim Info  70,A5,488,35 70 - NDC NOT COVERED A5 - NOT COV UNDERPART D LAW PAYER 2: 75 - PRIOR AUTHORIZATION REQRD.    PA completed on [facility], awaiting for decision.

## 2025-03-27 NOTE — TELEPHONE ENCOUNTER
simvastatin 40 MG Oral Tab, Take 1 tablet (40 mg total) by mouth nightly., Disp: 90 tablet, Rfl: 3

## 2025-03-27 NOTE — TELEPHONE ENCOUNTER
Spoke to patient's daughter, Niya (LORETO on file) and discussed results and recommendations. Order sent to [facility] via [place]. Follow up appointment scheduled for 6/5/25 at 12:15pm. Reviewed time, date, place and where to park. Patient's daughter verbalized understanding

## 2025-03-31 RX ORDER — SIMVASTATIN 40 MG
40 TABLET ORAL NIGHTLY
Qty: 90 TABLET | Refills: 3 | Status: SHIPPED | OUTPATIENT
Start: 2025-03-31

## 2025-03-31 NOTE — TELEPHONE ENCOUNTER
Refill Per Protocol     Requested Prescriptions   Pending Prescriptions Disp Refills    simvastatin 40 MG Oral Tab 90 tablet 3     Sig: Take 1 tablet (40 mg total) by mouth nightly.       Cholesterol Medication Protocol Passed - 3/31/2025  2:19 PM        Passed - ALT < 80     Lab Results   Component Value Date    ALT 54 (H) 01/02/2025             Passed - ALT resulted within past year        Passed - Lipid panel within past 12 months     Lab Results   Component Value Date    CHOLEST 160 11/01/2024    TRIG 134 11/01/2024    HDL 47 11/01/2024    LDL 89 11/01/2024    VLDL 22 11/01/2024    NONHDLC 113 11/01/2024             Passed - In person appointment or virtual visit in the past 12 mos or appointment in next 3 mos     Recent Outpatient Visits              6 days ago Diabetes mellitus type 2 in nonobese ([facility])    [facility] Margaret Correa MD    Office Visit    1 week ago Type 2 diabetes mellitus without complication, without long-term current use of insulin ([facility])    [facility] Osei Anton MD    Office Visit    2 weeks ago GÉNESIS (obstructive sleep apnea)    [facility]    Office Visit    2 months ago Acute exacerbation of chronic obstructive pulmonary disease (COPD) ([facility])    [facility] Dave Becker MD    Office Visit    2 months ago Acute exacerbation of chronic obstructive pulmonary disease (COPD) ([facility])    [facility] Gem Sosa APRN    Office Visit          Future Appointments         Provider Department Appt Notes    In 2 months Dave Becker MD [facility] 3 month    In 2 months Margaret Correa MD [facility] 3 months follow up    In 6 months Osei Anton MD [facility]                      Passed - Medication is active on med list

## 2025-04-01 ENCOUNTER — NURSE TRIAGE (OUTPATIENT)
Dept: FAMILY MEDICINE CLINIC | Facility: CLINIC | Age: 65
End: 2025-04-01

## 2025-04-01 DIAGNOSIS — M79.10 MYALGIA: Primary | ICD-10-CM

## 2025-04-01 RX ORDER — CYCLOBENZAPRINE HCL 10 MG
10 TABLET ORAL 3 TIMES DAILY
Qty: 30 TABLET | Refills: 1 | Status: SHIPPED | OUTPATIENT
Start: 2025-04-01 | End: 2025-04-21

## 2025-04-01 NOTE — TELEPHONE ENCOUNTER
Reason for Disposition   Age > 50 and no history of prior similar back pain    Protocols used: Back Pain-A-[place]  Language line #[phone] assisted with the phone call. Patient states that she has whole body arthritis pain and would like a prescription for a muscle relaxant.  Reports having such pain for months, especially her back, neck and wrists.  I offered an appointment this week but patient declined due to caring for her  and his appointments conflict with what I offered.  Patient only wants to see Dr. Correa.  Please advise on medication. Patient states that she has tried Tylenol, Ibuprofen without relief.

## 2025-04-03 ENCOUNTER — TELEPHONE (OUTPATIENT)
Dept: FAMILY MEDICINE CLINIC | Facility: CLINIC | Age: 65
End: 2025-04-03

## 2025-04-03 DIAGNOSIS — E11.9 TYPE 2 DIABETES MELLITUS WITHOUT COMPLICATION, WITHOUT LONG-TERM CURRENT USE OF INSULIN (HCC): Primary | ICD-10-CM

## 2025-04-03 NOTE — TELEPHONE ENCOUNTER
Patient called to speak to Dr. Correa in regards to increasing the dosage for the following medication:    semaglutide (OZEMPIC, 0.25 OR 0.5 MG/DOSE,) 2 MG/3ML Subcutaneous Solution Pen-injector     Patient states that Dr. Osei Anton is requesting the increase, patient does not know the dosage amount.     Please call the patient with any questions.

## 2025-04-03 NOTE — TELEPHONE ENCOUNTER
Dr. Correa - patient is requesting to increase dosage of Ozempic. Order pended, please review dosing and quantity, thank you    Last prescribed 11/25/24 quantity 6 mL, 1 refill     Last office visit 3/25/25

## 2025-04-12 ENCOUNTER — TELEPHONE (OUTPATIENT)
Dept: FAMILY MEDICINE CLINIC | Facility: CLINIC | Age: 65
End: 2025-04-12

## 2025-04-18 RX ORDER — ALENDRONATE SODIUM 70 MG/1
70 TABLET ORAL
Qty: 12 TABLET | Refills: 3 | Status: SHIPPED | OUTPATIENT
Start: 2025-04-18

## 2025-04-18 NOTE — TELEPHONE ENCOUNTER
Refill passed per [facility] protocol.    Requested Prescriptions   Pending Prescriptions Disp Refills    ALENDRONATE 70 MG Oral Tab [Pharmacy Med Name: ALENDRONATE SODIUM 70 MG TAB] 12 tablet 3     Sig: Take 1 tablet (70 mg total) by mouth every 7 days.       Osteoporosis Medication Protocol Passed - 4/18/2025  2:34 PM        Passed - In person appointment or virtual visit in the past 6 mos or appointment in next 3 mos     Recent Outpatient Visits              3 weeks ago Diabetes mellitus type 2 in nonobese ([facility])    [facility] Margaret Correa MD    Office Visit    4 weeks ago Type 2 diabetes mellitus without complication, without long-term current use of insulin ([facility])    [facility] [place] [facility] Osei Anton MD    Office Visit    1 month ago GÉNESIS (obstructive sleep apnea)    [facility]    Office Visit    2 months ago Acute exacerbation of chronic obstructive pulmonary disease (COPD) ([facility]    Office Visit    3 months ago Acute exacerbation of chronic obstructive pulmonary disease (COPD) ([facility])    [facility] Gem Sosa APRN    Office Visit          Future Appointments         Provider Department Appt Notes    In 1 month [facility]    In 2 months Margaret Correa MD [facility] 3 month follow up policyinformed to pt  payor down. not able to verify insurance. pt to arrive early bring insuracnce card/photo id    In 5 months Osei Anton MD [facility]                     Passed - DEXA scan within past 2 years        Passed - CMP within the past 12 months        Passed - Calcium level between 8.3 and 10.3     Lab Results    Component Value Date    CA 9.1 01/06/2025               Passed - GFR level greater than 35     GFR Evaluation  EGFRCR: 93 , resulted on 1/6/2025          Passed - Medication is active on med list           Future Appointments         Provider Department Appt Notes    In 1 month Dave Becker MD [facility] 3 month    In 2 months Margaret Correa MD [facility] 3 month follow up policyinformed to pt  payor down. not able to verify insurance. pt to arrive early bring insuracnce card/photo id    In 5 months Osei Anton MD [facility] ([place], [place])           Recent Outpatient Visits              3 weeks ago Diabetes mellitus type 2 in nonobese ([facility])    [facility] Margaret Correa MD    Office Visit    4 weeks ago Type 2 diabetes mellitus without complication, without long-term current use of insulin ([facility])    [facility] ([place], [place]) Osei Anton MD    Office Visit    1 month ago GÉNESIS (obstructive sleep apnea)    [facility]    Office Visit    2 months ago Acute exacerbation of chronic obstructive pulmonary disease (COPD) ([facility])    [facility] Dave Becker MD    Office Visit    3 months ago Acute exacerbation of chronic obstructive pulmonary disease (COPD) ([facility])    [facility] Gem Sosa APRN    Office Visit

## 2025-04-22 NOTE — PROGRESS NOTES
Subjective:   Patient ID: Lena Macedo is a 65 year old female.    HPI  Patient here for f/u   Has a lot of stress -  is on dialysis   Diabetes controlled - taking meds regularly   Anxiety stable but takes meds     History/Other:   Review of Systems   Constitutional:  Positive for fatigue. Negative for activity change, appetite change and unexpected weight change.   Respiratory:  Negative for cough, chest tightness and shortness of breath.    Cardiovascular: Negative.  Negative for chest pain and leg swelling.   Gastrointestinal:         Bloated   Musculoskeletal:  Positive for back pain.   Psychiatric/Behavioral:  Positive for dysphoric mood and sleep disturbance. Negative for agitation. The patient is nervous/anxious.        Current Medications[1]  Allergies:Allergies[2]    Objective:   Physical Exam  Constitutional:       Appearance: She is well-developed.   Cardiovascular:      Rate and Rhythm: Normal rate and regular rhythm.      Heart sounds: Normal heart sounds. No murmur heard.     No gallop.   Pulmonary:      Effort: Pulmonary effort is normal. No respiratory distress.      Breath sounds: Normal breath sounds. No wheezing.   Abdominal:      General: There is no distension.      Tenderness: There is no abdominal tenderness.   Musculoskeletal:         General: Tenderness present. No deformity. Normal range of motion.   Neurological:      Deep Tendon Reflexes: Reflexes are normal and symmetric.         Assessment & Plan:   1. Diabetes mellitus type 2 in nonobese (HCC)    Cpm   2. Anxiety cpm   F/u in 3months   Orders Placed This Encounter   Procedures    Microalb/Creat Ratio, Random Urine [E]       Meds This Visit:  Requested Prescriptions     Signed Prescriptions Disp Refills    cephALEXin 500 MG Oral Cap 14 capsule 0     Sig: Take 1 capsule (500 mg total) by mouth 2 (two) times daily.    Betamethasone Dipropionate Aug 0.05 % External Lotion 30 mL 1     Sig: Apply 1 each topically 2 (two) times  daily as needed.       Imaging & Referrals:  None         [1]   Current Outpatient Medications   Medication Sig Dispense Refill    cephALEXin 500 MG Oral Cap Take 1 capsule (500 mg total) by mouth 2 (two) times daily. 14 capsule 0    Betamethasone Dipropionate Aug 0.05 % External Lotion Apply 1 each topically 2 (two) times daily as needed. 30 mL 1    albuterol 108 (90 Base) MCG/ACT Inhalation Aero Soln Inhale 2 puffs into the lungs every 6 (six) hours as needed for Wheezing or Shortness of Breath. 1 each 3    levothyroxine 137 MCG Oral Tab Take 137 mcg by mouth before breakfast. 90 tablet 3    acetaminophen 500 MG Oral Tab Take 2 tablets (1,000 mg total) by mouth every 6 (six) hours as needed for Pain.      hydrOXYzine Pamoate 25 MG Oral Cap Take 1 capsule (25 mg total) by mouth 3 (three) times daily as needed for Itching. 90 capsule 0    propranolol 40 MG Oral Tab Take 1 tablet (40 mg total) by mouth 2 (two) times daily. 180 tablet 3    aspirin 81 MG Oral Tab EC Take 1 tablet (81 mg total) by mouth daily.      Incontinence Supply Disposable (COMFORT PROTECT ADULT DIAPER/L) Does not apply Misc 1 each in the morning, at noon, and at bedtime. 270 each 11    pantoprazole 40 MG Oral Tab EC Take 1 tablet (40 mg total) by mouth before breakfast. 90 tablet 3    Fenofibrate 160 MG Oral Tab Take 1 tablet (160 mg total) by mouth daily. 90 tablet 3    ergocalciferol 1.25 MG (12045 UT) Oral Cap TOME GINNY CAPSULA POR VIA ORAL GINNY VES POR SEMANA 12 capsule 3    sertraline 50 MG Oral Tab Take 1.5 tablets (75 mg total) by mouth daily. 135 tablet 1    alendronate 70 MG Oral Tab Take 1 tablet (70 mg total) by mouth every 7 days. 12 tablet 3    semaglutide 4 MG/3ML Subcutaneous Solution Pen-injector Inject 1 mg into the skin once a week. 1 each 1    cyclobenzaprine 10 MG Oral Tab Take 1 tablet (10 mg total) by mouth 3 (three) times daily for 20 days. 30 tablet 1    simvastatin 40 MG Oral Tab Take 1 tablet (40 mg total) by mouth  nightly. 90 tablet 3   [2]   Allergies  Allergen Reactions    Advair Hfa OTHER (SEE COMMENTS)     Tingling sensation in throat

## 2025-05-27 NOTE — TELEPHONE ENCOUNTER
Please review. Protocol Failed; No Protocol    Medication(s) to Refill:   Requested Prescriptions     Pending Prescriptions Disp Refills    ergocalciferol 1.25 MG (56077 UT) Oral Cap 12 capsule 3     Sig: TOME GINNY CAPSULA POR VIA ORAL GINNY VES POR SEMANA    hydrOXYzine Pamoate 25 MG Oral Cap 90 capsule 3     Sig: Take 1 capsule (25 mg total) by mouth 3 (three) times daily as needed for Itching.         Reason for Medication Refill being sent to Provider / Reason Protocol Failed:  [x] Non-Protocol Medication        Recent Labs:  Lab Results   Component Value Date    VITD 25.7 04/16/2018

## 2025-05-27 NOTE — TELEPHONE ENCOUNTER
Vitamin D     hydrOXYzine Pamoate 25 MG Oral Cap, Take 1 capsule (25 mg total) by mouth 3 (three) times daily as needed for Itching., Disp: 90 capsule, Rfl: 0

## 2025-05-28 DIAGNOSIS — R49.0 HOARSENESS: ICD-10-CM

## 2025-05-28 RX ORDER — ERGOCALCIFEROL 1.25 MG/1
CAPSULE, LIQUID FILLED ORAL
Qty: 12 CAPSULE | Refills: 3 | Status: SHIPPED | OUTPATIENT
Start: 2025-05-28

## 2025-05-28 RX ORDER — HYDROXYZINE PAMOATE 25 MG/1
25 CAPSULE ORAL 3 TIMES DAILY PRN
Qty: 90 CAPSULE | Refills: 3 | Status: SHIPPED | OUTPATIENT
Start: 2025-05-28

## 2025-05-29 RX ORDER — PANTOPRAZOLE SODIUM 40 MG/1
40 TABLET, DELAYED RELEASE ORAL
Qty: 90 TABLET | Refills: 3 | Status: SHIPPED | OUTPATIENT
Start: 2025-05-29

## 2025-06-05 ENCOUNTER — OFFICE VISIT (OUTPATIENT)
Dept: PULMONOLOGY | Facility: CLINIC | Age: 65
End: 2025-06-05

## 2025-06-05 VITALS
DIASTOLIC BLOOD PRESSURE: 66 MMHG | WEIGHT: 219.63 LBS | SYSTOLIC BLOOD PRESSURE: 121 MMHG | HEIGHT: 62 IN | OXYGEN SATURATION: 97 % | BODY MASS INDEX: 40.42 KG/M2 | HEART RATE: 71 BPM

## 2025-06-05 DIAGNOSIS — I82.90 VTE (VENOUS THROMBOEMBOLISM): ICD-10-CM

## 2025-06-05 DIAGNOSIS — G47.33 OSA ON CPAP: Primary | ICD-10-CM

## 2025-06-05 PROCEDURE — 3074F SYST BP LT 130 MM HG: CPT | Performed by: INTERNAL MEDICINE

## 2025-06-05 PROCEDURE — 3078F DIAST BP <80 MM HG: CPT | Performed by: INTERNAL MEDICINE

## 2025-06-05 PROCEDURE — 99213 OFFICE O/P EST LOW 20 MIN: CPT | Performed by: INTERNAL MEDICINE

## 2025-06-05 PROCEDURE — 3008F BODY MASS INDEX DOCD: CPT | Performed by: INTERNAL MEDICINE

## 2025-06-05 NOTE — PROGRESS NOTES
The patient is a 65-year-old female who I know well from prior evaluation who comes in now for follow-up.  In general, she is doing well.  She has obstructive sleep apnea and her average daily usage is 7 hours and 36 minutes and residual events are 0.4/h.  She is benefiting from ongoing usage.  When she does not use the machine, she occasionally will wake up with a gasp.    Review of Systems:  Vision normal. Ear nose and throat normal. Bowel normal. Bladder function normal. No depression. No thyroid disease. No lymphatic system concerns.  No rash. Muscles and joints unremarkable. No weight loss no weight gain.    Physical Examination:  Vital signs normal. HEENT examination is unremarkable with pupils equal round and reactive to light and accommodation. Neck without adenopathy, thyromegaly, JVD nor bruit. Lungs clear to auscultation and percussion. Cardiac regular rate and rhythm no murmur. Abdomen nontender, without hepatosplenomegaly and no mass appreciable. Extremities and Musculoskeletal without clubbing cyanosis nor edema, and mobility acceptable. Neurologic grossly intact with symmetric tone and strength and reflex.    Assessment and plan:  1.  Obstructive sleep apnea-excellent download.  Vigilance with CPAP every night all night, weight loss, avoid alcohol and sedating drug and never drive if sleepy.  See me in the office at the 1 year interval or sooner if needed and contact me promptly if new trouble.    2.  History of venous thromboembolism-lifelong baby aspirin    3.  History of asthma-controlled.    4.  History of pulmonary nodule-resolved    5.  Right lower extremity discomfort-will check lower extremity venous ultrasound to screen for clot.  May be sciatica.

## 2025-06-10 RX ORDER — FENOFIBRATE 160 MG/1
160 TABLET ORAL DAILY
Qty: 90 TABLET | Refills: 3 | Status: SHIPPED | OUTPATIENT
Start: 2025-06-10

## 2025-06-12 ENCOUNTER — TELEPHONE (OUTPATIENT)
Dept: PULMONOLOGY | Facility: CLINIC | Age: 65
End: 2025-06-12

## 2025-06-12 ENCOUNTER — HOSPITAL ENCOUNTER (OUTPATIENT)
Dept: ULTRASOUND IMAGING | Facility: HOSPITAL | Age: 65
Discharge: HOME | End: 2025-06-12
Attending: INTERNAL MEDICINE
Payer: MEDICARE

## 2025-06-12 DIAGNOSIS — I82.90 VTE (VENOUS THROMBOEMBOLISM): ICD-10-CM

## 2025-06-12 PROCEDURE — 93970 EXTREMITY STUDY: CPT | Performed by: INTERNAL MEDICINE

## 2025-06-13 ENCOUNTER — TELEPHONE (OUTPATIENT)
Dept: PULMONOLOGY | Facility: CLINIC | Age: 65
End: 2025-06-13

## 2025-06-13 DIAGNOSIS — I82.90 VTE (VENOUS THROMBOEMBOLISM): Primary | ICD-10-CM

## 2025-06-13 NOTE — TELEPHONE ENCOUNTER
Patient calling for her ultra sound results completed yesterday. Patient asking for plan of care and if any medication will be prescribed. Please call with  at [phone],thanks.

## 2025-06-16 NOTE — TELEPHONE ENCOUNTER
RN, please call the daughter.  There was a tiny distal blood clot in the calf.  She needs to take baby aspirin lifelong.  Also, please facilitate a repeat bilateral lower extremity venous ultrasound at the 3-week interval.

## 2025-06-16 NOTE — TELEPHONE ENCOUNTER
Patient's daughter would like to get the results of the Ultrasound test and find out plan of treatment.

## 2025-06-23 NOTE — TELEPHONE ENCOUNTER
Please review.  Protocol failed / Has no protocol.     Requested Prescriptions   Pending Prescriptions Disp Refills    semaglutide (OZEMPIC, 1 MG/DOSE,) 4 MG/3ML Subcutaneous Solution Pen-injector [Pharmacy Med Name: OZEMPIC 4 MG/3 ML (1 MG/DOSE)] 3 mL 1     Sig: Inject 1 mg into the skin once a week.       Diabetes Medication Protocol Failed - 6/23/2025  3:26 PM        Failed - Last A1C < 7.5 and within past 6 months     Lab Results   Component Value Date    A1C 6.5 (A) 11/01/2024           Failed - Microalbumin procedure in past 12 months or taking ACE/ARB        Passed - In person appointment or virtual visit in the past 6 mos or appointment in next 3 mos        Passed - EGFRCR or GFRNAA > 50        Passed - GFR in the past 12 months        Passed - Medication is active on med list

## 2025-06-24 RX ORDER — SEMAGLUTIDE 1.34 MG/ML
1 INJECTION, SOLUTION SUBCUTANEOUS WEEKLY
Qty: 3 ML | Refills: 0 | Status: SHIPPED | OUTPATIENT
Start: 2025-06-24

## 2025-06-26 ENCOUNTER — OFFICE VISIT (OUTPATIENT)
Dept: FAMILY MEDICINE CLINIC | Facility: CLINIC | Age: 65
End: 2025-06-26

## 2025-06-26 VITALS
BODY MASS INDEX: 40.3 KG/M2 | HEART RATE: 75 BPM | OXYGEN SATURATION: 97 % | RESPIRATION RATE: 20 BRPM | HEIGHT: 62 IN | SYSTOLIC BLOOD PRESSURE: 134 MMHG | TEMPERATURE: 96 F | WEIGHT: 219 LBS | DIASTOLIC BLOOD PRESSURE: 79 MMHG

## 2025-06-26 DIAGNOSIS — E11.9 DIABETES MELLITUS TYPE 2 IN NONOBESE (HCC): Primary | ICD-10-CM

## 2025-06-26 DIAGNOSIS — R22.2 LUMP OF SKIN OF BACK: ICD-10-CM

## 2025-06-26 DIAGNOSIS — M54.59 OTHER LOW BACK PAIN: ICD-10-CM

## 2025-06-26 LAB
CARTRIDGE EXPIRATION DATE: ABNORMAL DATE
CREAT UR-SCNC: 41.9 MG/DL
HEMOGLOBIN A1C: 6.2 % (ref 4.3–5.6)
MICROALBUMIN UR-MCNC: <0.3 MG/DL

## 2025-06-26 PROCEDURE — 3061F NEG MICROALBUMINURIA REV: CPT | Performed by: FAMILY MEDICINE

## 2025-06-26 PROCEDURE — 3044F HG A1C LEVEL LT 7.0%: CPT | Performed by: FAMILY MEDICINE

## 2025-06-26 PROCEDURE — 3008F BODY MASS INDEX DOCD: CPT | Performed by: FAMILY MEDICINE

## 2025-06-26 PROCEDURE — 99214 OFFICE O/P EST MOD 30 MIN: CPT | Performed by: FAMILY MEDICINE

## 2025-06-26 PROCEDURE — 3075F SYST BP GE 130 - 139MM HG: CPT | Performed by: FAMILY MEDICINE

## 2025-06-26 PROCEDURE — 83036 HEMOGLOBIN GLYCOSYLATED A1C: CPT | Performed by: FAMILY MEDICINE

## 2025-06-26 PROCEDURE — 3078F DIAST BP <80 MM HG: CPT | Performed by: FAMILY MEDICINE

## 2025-06-26 RX ORDER — HYDROXYZINE HYDROCHLORIDE 25 MG/1
25 TABLET, FILM COATED ORAL 3 TIMES DAILY PRN
COMMUNITY
Start: 2025-03-26

## 2025-06-26 RX ORDER — PHENTERMINE HYDROCHLORIDE 8 MG/1
8 TABLET ORAL
COMMUNITY
Start: 2025-06-17

## 2025-06-26 NOTE — PROGRESS NOTES
Subjective:   Patient ID: Lena Macedo is a 65 year old female.    HPI  Here for f/u diabetes   HbA1C is 6.2   Also complains about low back pain   Has also lump on the skin of the back she thinks its slightly larger then before   History/Other:   Review of Systems   Constitutional: Negative.  Negative for fatigue.   Respiratory:  Negative for cough, chest tightness and shortness of breath.    Cardiovascular: Negative.    Musculoskeletal:  Positive for back pain and gait problem. Negative for joint swelling and myalgias.   Skin:         See hpi    Neurological:  Negative for weakness and numbness.         Current Medications[1]  Allergies:Allergies[2]    Objective:   Physical Exam  Constitutional:       Appearance: She is well-developed.   Cardiovascular:      Rate and Rhythm: Normal rate and regular rhythm.      Heart sounds: Normal heart sounds.   Pulmonary:      Effort: Pulmonary effort is normal. No respiratory distress.      Breath sounds: Normal breath sounds. No wheezing or rales.   Abdominal:      General: There is no distension.      Palpations: Abdomen is soft.      Tenderness: There is no abdominal tenderness.   Musculoskeletal:         General: Tenderness present. No deformity.      Lumbar back: Spasms and tenderness present. Decreased range of motion.   Neurological:      Mental Status: She is alert.      Motor: No abnormal muscle tone.      Deep Tendon Reflexes: Reflexes are normal and symmetric.         Assessment & Plan:   1. Diabetes mellitus type 2 in nonobese (HCC) cpm   2. Other low back pain start physical therapy appears msk pain    3. Lump of skin of back see a surgeon        Orders Placed This Encounter   Procedures    POC Glycohemoglobin [39852]    Microalb/Creat Ratio, Random Urine [E]       Meds This Visit:  Requested Prescriptions     Signed Prescriptions Disp Refills    sertraline 50 MG Oral Tab 135 tablet 1     Sig: Take 1.5 tablets (75 mg total) by mouth daily.       Imaging &  Referrals:  PHYSICAL THERAPY - INTERNAL  SURGERY - INTERNAL         [1]   Current Outpatient Medications   Medication Sig Dispense Refill    hydrOXYzine 25 MG Oral Tab Take 1 tablet (25 mg total) by mouth 3 (three) times daily as needed for Itching.      LOMAIRA 8 MG Oral Tab 8 mg.      sertraline 50 MG Oral Tab Take 1.5 tablets (75 mg total) by mouth daily. 135 tablet 1    semaglutide (OZEMPIC, 1 MG/DOSE,) 4 MG/3ML Subcutaneous Solution Pen-injector Inject 1 mg into the skin once a week. INYECTE 1 MG en la piel GINNY VEZ POR SEMANA 3 mL 0    Fenofibrate 160 MG Oral Tab Take 1 tablet (160 mg total) by mouth daily. 90 tablet 3    pantoprazole 40 MG Oral Tab EC Take 1 tablet (40 mg total) by mouth before breakfast. 90 tablet 3    ergocalciferol 1.25 MG (61098 UT) Oral Cap TOME GINNY CAPSULA POR VIA ORAL GINNY VES POR SEMANA 12 capsule 3    hydrOXYzine Pamoate 25 MG Oral Cap Take 1 capsule (25 mg total) by mouth 3 (three) times daily as needed for Itching. 90 capsule 3    alendronate 70 MG Oral Tab Take 1 tablet (70 mg total) by mouth every 7 days. 12 tablet 3    simvastatin 40 MG Oral Tab Take 1 tablet (40 mg total) by mouth nightly. 90 tablet 3    cephALEXin 500 MG Oral Cap Take 1 capsule (500 mg total) by mouth 2 (two) times daily. 14 capsule 0    Betamethasone Dipropionate Aug 0.05 % External Lotion Apply 1 each topically 2 (two) times daily as needed. 30 mL 1    albuterol 108 (90 Base) MCG/ACT Inhalation Aero Soln Inhale 2 puffs into the lungs every 6 (six) hours as needed for Wheezing or Shortness of Breath. 1 each 3    levothyroxine 137 MCG Oral Tab Take 137 mcg by mouth before breakfast. 90 tablet 3    acetaminophen 500 MG Oral Tab Take 2 tablets (1,000 mg total) by mouth every 6 (six) hours as needed for Pain.      propranolol 40 MG Oral Tab Take 1 tablet (40 mg total) by mouth 2 (two) times daily. 180 tablet 3    aspirin 81 MG Oral Tab EC Take 1 tablet (81 mg total) by mouth daily.      Incontinence Supply  Disposable (COMFORT PROTECT ADULT DIAPER/L) Does not apply Misc 1 each in the morning, at noon, and at bedtime. 270 each 11   [2]   Allergies  Allergen Reactions    Advair Hfa OTHER (SEE COMMENTS)     Tingling sensation in throat

## 2025-07-01 ENCOUNTER — HOSPITAL ENCOUNTER (EMERGENCY)
Facility: HOSPITAL | Age: 65
Discharge: HOME | End: 2025-07-01
Attending: EMERGENCY MEDICINE
Payer: COMMERCIAL

## 2025-07-01 ENCOUNTER — APPOINTMENT (OUTPATIENT)
Dept: CT IMAGING | Facility: HOSPITAL | Age: 65
End: 2025-07-01
Attending: EMERGENCY MEDICINE
Payer: COMMERCIAL

## 2025-07-01 ENCOUNTER — APPOINTMENT (OUTPATIENT)
Dept: ULTRASOUND IMAGING | Facility: HOSPITAL | Age: 65
End: 2025-07-01
Attending: EMERGENCY MEDICINE
Payer: COMMERCIAL

## 2025-07-01 ENCOUNTER — APPOINTMENT (OUTPATIENT)
Dept: GENERAL RADIOLOGY | Facility: HOSPITAL | Age: 65
End: 2025-07-01
Attending: EMERGENCY MEDICINE
Payer: COMMERCIAL

## 2025-07-01 VITALS
HEART RATE: 67 BPM | OXYGEN SATURATION: 93 % | TEMPERATURE: 98 F | WEIGHT: 222 LBS | SYSTOLIC BLOOD PRESSURE: 109 MMHG | RESPIRATION RATE: 17 BRPM | HEIGHT: 61 IN | BODY MASS INDEX: 41.91 KG/M2 | DIASTOLIC BLOOD PRESSURE: 55 MMHG
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
## 2025-07-01 DIAGNOSIS — S29.9XXA CHEST WALL INJURY, INITIAL ENCOUNTER: ICD-10-CM

## 2025-07-01 DIAGNOSIS — S39.92XA INJURY OF BACK, INITIAL ENCOUNTER: ICD-10-CM

## 2025-07-01 LAB
ANION GAP SERPL CALC-SCNC: 7 MMOL/L (ref 0–18)
ANTIBODY SCREEN: NEGATIVE
BASOPHILS # BLD AUTO: 0.05 X10(3) UL (ref 0–0.2)
BASOPHILS NFR BLD AUTO: 0.4 %
BUN BLD-MCNC: 23 MG/DL (ref 9–23)
BUN/CREAT SERPL: 23.2 (ref 10–20)
CALCIUM BLD-MCNC: 9.8 MG/DL (ref 8.7–10.4)
CHLORIDE SERPL-SCNC: 103 MMOL/L (ref 98–112)
CO2 SERPL-SCNC: 28 MMOL/L (ref 21–32)
CREAT BLD-MCNC: 0.99 MG/DL (ref 0.55–1.02)
DEPRECATED RDW RBC AUTO: 41.3 FL (ref 35.1–46.3)
EGFRCR SERPLBLD CKD-EPI 2021: 63 ML/MIN/1.73M2 (ref 60–?)
EOSINOPHIL # BLD AUTO: 0.11 X10(3) UL (ref 0–0.7)
EOSINOPHIL NFR BLD AUTO: 1 %
ERYTHROCYTE [DISTWIDTH] IN BLOOD BY AUTOMATED COUNT: 13.4 % (ref 11–15)
ETHANOL SERPL-MCNC: <3 MG/DL (ref ?–3)
GLUCOSE BLD-MCNC: 109 MG/DL (ref 70–99)
GLUCOSE BLDC GLUCOMTR-MCNC: 111 MG/DL (ref 70–99)
HCT VFR BLD AUTO: 33.2 % (ref 35–48)
HGB BLD-MCNC: 10.7 G/DL (ref 12–16)
IMM GRANULOCYTES # BLD AUTO: 0.06 X10(3) UL (ref 0–1)
IMM GRANULOCYTES NFR BLD: 0.5 %
LYMPHOCYTES # BLD AUTO: 2.02 X10(3) UL (ref 1–4)
LYMPHOCYTES NFR BLD AUTO: 17.7 %
MCH RBC QN AUTO: 27.3 PG (ref 26–34)
MCHC RBC AUTO-ENTMCNC: 32.2 G/DL (ref 31–37)
MCV RBC AUTO: 84.7 FL (ref 80–100)
MONOCYTES # BLD AUTO: 0.72 X10(3) UL (ref 0.1–1)
MONOCYTES NFR BLD AUTO: 6.3 %
NEUTROPHILS # BLD AUTO: 8.43 X10 (3) UL (ref 1.5–7.7)
NEUTROPHILS # BLD AUTO: 8.43 X10(3) UL (ref 1.5–7.7)
NEUTROPHILS NFR BLD AUTO: 74.1 %
OSMOLALITY SERPL CALC.SUM OF ELEC: 290 MOSM/KG (ref 275–295)
PLATELET # BLD AUTO: 314 10(3)UL (ref 150–450)
POTASSIUM SERPL-SCNC: 3.7 MMOL/L (ref 3.5–5.1)
RBC # BLD AUTO: 3.92 X10(6)UL (ref 3.8–5.3)
RH BLOOD TYPE: POSITIVE
RH BLOOD TYPE: POSITIVE
SODIUM SERPL-SCNC: 138 MMOL/L (ref 136–145)
TROPONIN I SERPL HS-MCNC: <3 NG/L (ref ?–34)
WBC # BLD AUTO: 11.4 X10(3) UL (ref 4–11)

## 2025-07-01 PROCEDURE — 99285 EMERGENCY DEPT VISIT HI MDM: CPT

## 2025-07-01 PROCEDURE — 86901 BLOOD TYPING SEROLOGIC RH(D): CPT | Performed by: EMERGENCY MEDICINE

## 2025-07-01 PROCEDURE — 73560 X-RAY EXAM OF KNEE 1 OR 2: CPT | Performed by: RADIOLOGY

## 2025-07-01 PROCEDURE — 84484 ASSAY OF TROPONIN QUANT: CPT | Performed by: EMERGENCY MEDICINE

## 2025-07-01 PROCEDURE — 85025 COMPLETE CBC W/AUTO DIFF WBC: CPT | Performed by: EMERGENCY MEDICINE

## 2025-07-01 PROCEDURE — 86900 BLOOD TYPING SEROLOGIC ABO: CPT | Performed by: EMERGENCY MEDICINE

## 2025-07-01 PROCEDURE — 96375 TX/PRO/DX INJ NEW DRUG ADDON: CPT

## 2025-07-01 PROCEDURE — 93010 ELECTROCARDIOGRAM REPORT: CPT

## 2025-07-01 PROCEDURE — 74177 CT ABD & PELVIS W/CONTRAST: CPT | Performed by: RADIOLOGY

## 2025-07-01 PROCEDURE — 80048 BASIC METABOLIC PNL TOTAL CA: CPT | Performed by: EMERGENCY MEDICINE

## 2025-07-01 PROCEDURE — 71260 CT THORAX DX C+: CPT | Performed by: RADIOLOGY

## 2025-07-01 PROCEDURE — 82962 GLUCOSE BLOOD TEST: CPT

## 2025-07-01 PROCEDURE — 93971 EXTREMITY STUDY: CPT | Performed by: RADIOLOGY

## 2025-07-01 PROCEDURE — 72125 CT NECK SPINE W/O DYE: CPT | Performed by: RADIOLOGY

## 2025-07-01 PROCEDURE — 93005 ELECTROCARDIOGRAM TRACING: CPT

## 2025-07-01 PROCEDURE — 82077 ASSAY SPEC XCP UR&BREATH IA: CPT | Performed by: EMERGENCY MEDICINE

## 2025-07-01 PROCEDURE — 96374 THER/PROPH/DIAG INJ IV PUSH: CPT

## 2025-07-01 PROCEDURE — 70450 CT HEAD/BRAIN W/O DYE: CPT | Performed by: RADIOLOGY

## 2025-07-01 PROCEDURE — 86850 RBC ANTIBODY SCREEN: CPT | Performed by: EMERGENCY MEDICINE

## 2025-07-01 RX ORDER — KETOROLAC TROMETHAMINE 15 MG/ML
15 INJECTION, SOLUTION INTRAMUSCULAR; INTRAVENOUS ONCE
Status: COMPLETED | OUTPATIENT
Start: 2025-07-01 | End: 2025-07-01

## 2025-07-01 RX ORDER — KETOROLAC TROMETHAMINE 10 MG/1
10 TABLET, FILM COATED ORAL EVERY 6 HOURS PRN
Qty: 20 TABLET | Refills: 0 | Status: SHIPPED | OUTPATIENT
Start: 2025-07-01 | End: 2025-07-06

## 2025-07-01 RX ORDER — ORPHENADRINE CITRATE 30 MG/ML
30 INJECTION INTRAMUSCULAR; INTRAVENOUS ONCE
Status: COMPLETED | OUTPATIENT
Start: 2025-07-01 | End: 2025-07-01

## 2025-07-01 RX ORDER — ORPHENADRINE CITRATE 100 MG/1
100 TABLET ORAL 2 TIMES DAILY
Qty: 20 TABLET | Refills: 0 | Status: SHIPPED | OUTPATIENT
Start: 2025-07-01 | End: 2025-07-11
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
## 2025-07-02 LAB
ATRIAL RATE: 69 BPM
P AXIS: 34 DEGREES
P-R INTERVAL: 188 MS
Q-T INTERVAL: 394 MS
QRS DURATION: 92 MS
QTC CALCULATION (BEZET): 422 MS
R AXIS: 13 DEGREES
T AXIS: 38 DEGREES
VENTRICULAR RATE: 69 BPM

## 2025-07-10 ENCOUNTER — HOSPITAL ENCOUNTER (OUTPATIENT)
Dept: ULTRASOUND IMAGING | Facility: HOSPITAL | Age: 65
Discharge: HOME | End: 2025-07-10
Attending: INTERNAL MEDICINE
Payer: MEDICARE

## 2025-07-10 DIAGNOSIS — I82.90 VTE (VENOUS THROMBOEMBOLISM): ICD-10-CM

## 2025-07-10 PROCEDURE — 93970 EXTREMITY STUDY: CPT | Performed by: INTERNAL MEDICINE

## 2025-07-24 ENCOUNTER — TELEPHONE (OUTPATIENT)
Dept: FAMILY MEDICINE CLINIC | Facility: CLINIC | Age: 65
End: 2025-07-24

## 2025-07-24 RX ORDER — SEMAGLUTIDE 1.34 MG/ML
1 INJECTION, SOLUTION SUBCUTANEOUS WEEKLY
Qty: 9 ML | Refills: 1 | Status: SHIPPED | OUTPATIENT
Start: 2025-07-24

## 2025-08-07 ENCOUNTER — OFFICE VISIT (OUTPATIENT)
Dept: FAMILY MEDICINE CLINIC | Facility: CLINIC | Age: 65
End: 2025-08-07

## 2025-08-07 VITALS
DIASTOLIC BLOOD PRESSURE: 79 MMHG | SYSTOLIC BLOOD PRESSURE: 119 MMHG | WEIGHT: 219 LBS | BODY MASS INDEX: 41.35 KG/M2 | HEART RATE: 73 BPM | RESPIRATION RATE: 20 BRPM | HEIGHT: 61 IN | OXYGEN SATURATION: 98 % | TEMPERATURE: 96 F

## 2025-08-07 DIAGNOSIS — R93.89 ABNORMAL RADIOGRAPH: Primary | ICD-10-CM

## 2025-08-07 DIAGNOSIS — F41.9 ANXIETY: ICD-10-CM

## 2025-08-07 PROCEDURE — 3074F SYST BP LT 130 MM HG: CPT | Performed by: FAMILY MEDICINE

## 2025-08-07 PROCEDURE — 3008F BODY MASS INDEX DOCD: CPT | Performed by: FAMILY MEDICINE

## 2025-08-07 PROCEDURE — G2211 COMPLEX E/M VISIT ADD ON: HCPCS | Performed by: FAMILY MEDICINE

## 2025-08-07 PROCEDURE — 99214 OFFICE O/P EST MOD 30 MIN: CPT | Performed by: FAMILY MEDICINE

## 2025-08-07 PROCEDURE — 3078F DIAST BP <80 MM HG: CPT | Performed by: FAMILY MEDICINE

## 2025-08-07 RX ORDER — APIXABAN 5 MG/1
5 TABLET, FILM COATED ORAL
COMMUNITY
Start: 2025-07-29

## 2025-08-07 RX ORDER — QUETIAPINE FUMARATE 25 MG/1
25 TABLET, FILM COATED ORAL NIGHTLY
Qty: 90 TABLET | Refills: 1 | Status: SHIPPED | OUTPATIENT
Start: 2025-08-07

## 2025-08-12 ENCOUNTER — TELEPHONE (OUTPATIENT)
Dept: FAMILY MEDICINE CLINIC | Facility: CLINIC | Age: 65
End: 2025-08-12

## 2025-08-12 ENCOUNTER — OFFICE VISIT (OUTPATIENT)
Facility: LOCATION | Age: 65
End: 2025-08-12
Attending: INTERNAL MEDICINE

## 2025-08-12 VITALS
DIASTOLIC BLOOD PRESSURE: 53 MMHG | WEIGHT: 222 LBS | OXYGEN SATURATION: 97 % | HEART RATE: 66 BPM | SYSTOLIC BLOOD PRESSURE: 113 MMHG | RESPIRATION RATE: 18 BRPM | TEMPERATURE: 97 F | HEIGHT: 61 IN | BODY MASS INDEX: 41.91 KG/M2

## 2025-08-12 DIAGNOSIS — Z51.81 ANTICOAGULATION MANAGEMENT ENCOUNTER: ICD-10-CM

## 2025-08-12 DIAGNOSIS — Z79.01 ANTICOAGULATION MANAGEMENT ENCOUNTER: ICD-10-CM

## 2025-08-12 DIAGNOSIS — M79.89 LEG SWELLING: ICD-10-CM

## 2025-08-12 DIAGNOSIS — I82.441 ACUTE DEEP VEIN THROMBOSIS (DVT) OF TIBIAL VEIN OF RIGHT LOWER EXTREMITY (HCC): Primary | ICD-10-CM

## 2025-08-15 ENCOUNTER — TELEPHONE (OUTPATIENT)
Dept: CASE MANAGEMENT | Age: 65
End: 2025-08-15

## 2025-08-20 ENCOUNTER — OFFICE VISIT (OUTPATIENT)
Dept: FAMILY MEDICINE CLINIC | Facility: CLINIC | Age: 65
End: 2025-08-20

## 2025-08-20 VITALS
OXYGEN SATURATION: 99 % | SYSTOLIC BLOOD PRESSURE: 119 MMHG | TEMPERATURE: 97 F | WEIGHT: 215 LBS | BODY MASS INDEX: 40.59 KG/M2 | DIASTOLIC BLOOD PRESSURE: 74 MMHG | HEART RATE: 66 BPM | HEIGHT: 61 IN | RESPIRATION RATE: 20 BRPM

## 2025-08-20 DIAGNOSIS — M54.59 OTHER LOW BACK PAIN: Primary | ICD-10-CM

## 2025-08-20 PROCEDURE — 99213 OFFICE O/P EST LOW 20 MIN: CPT | Performed by: FAMILY MEDICINE

## 2025-08-20 PROCEDURE — 3074F SYST BP LT 130 MM HG: CPT | Performed by: FAMILY MEDICINE

## 2025-08-20 PROCEDURE — 97811 ACUP 1/> W/O ESTIM EA ADD 15: CPT | Performed by: FAMILY MEDICINE

## 2025-08-20 PROCEDURE — 97810 ACUP 1/> WO ESTIM 1ST 15 MIN: CPT | Performed by: FAMILY MEDICINE

## 2025-08-20 PROCEDURE — 3008F BODY MASS INDEX DOCD: CPT | Performed by: FAMILY MEDICINE

## 2025-08-20 PROCEDURE — 3078F DIAST BP <80 MM HG: CPT | Performed by: FAMILY MEDICINE

## 2025-08-26 RX ORDER — HYDROXYZINE PAMOATE 25 MG/1
25 CAPSULE ORAL 3 TIMES DAILY PRN
Qty: 270 CAPSULE | Refills: 3 | Status: SHIPPED | OUTPATIENT
Start: 2025-08-26

## (undated) DIAGNOSIS — R49.0 HOARSENESS: ICD-10-CM

## (undated) DEVICE — YANKAUER,POOLE TIP,STERILE,50/CS: Brand: MEDLINE

## (undated) DEVICE — YANKAUER,FLEXIBLE HANDLE,REGLR CAPACITY: Brand: MEDLINE INDUSTRIES, INC.

## (undated) DEVICE — GLOVE SUR 7 SENSICARE PI MIC PIP CRM PWD F

## (undated) DEVICE — TOWEL,OR,DSP,ST,BLUE,DLX,2/PK,40PK/CS: Brand: MEDLINE

## (undated) DEVICE — UNDYED BRAIDED (POLYGLACTIN 910), SYNTHETIC ABSORBABLE SUTURE: Brand: COATED VICRYL

## (undated) DEVICE — MEDI-VAC NON-CONDUCTIVE SUCTION TUBING: Brand: CARDINAL HEALTH

## (undated) DEVICE — JELLY,LUBE,STERILE,FLIP TOP,TUBE,2-OZ: Brand: MEDLINE

## (undated) DEVICE — NDLCTR: FOAM/ADHESIVE 10CT 96/CS: Brand: MEDICAL ACTION INDUSTRIES

## (undated) DEVICE — PENCIL TELESCOPE MEGADYNE SE

## (undated) DEVICE — SUCTION CANISTER, 3000CC,SAFELINER: Brand: DEROYAL

## (undated) DEVICE — Device: Brand: DUAL NARE NASAL CANNULAE FEMALE LUER CON 7FT O2 TUBE

## (undated) DEVICE — SYRINGE, LUER SLIP, STERILE, 60ML: Brand: MEDLINE

## (undated) DEVICE — D AND C PACK: Brand: MEDLINE INDUSTRIES, INC.

## (undated) DEVICE — 3M™ STERI-DRAPE™ INSTRUMENT POUCH 1018L: Brand: STERI-DRAPE™

## (undated) DEVICE — KIT ENDO ORCAPOD 160/180/190

## (undated) DEVICE — HANDLE SUR BLU PLAS LT FLX SLIP ON ST DISP

## (undated) DEVICE — PEN 6" SURGICAL MARKING PURPL

## (undated) DEVICE — GAMMEX® PI HYBRID SIZE 7, STERILE POWDER-FREE SURGICAL GLOVE, POLYISOPRENE AND NEOPRENE BLEND: Brand: GAMMEX

## (undated) DEVICE — ELECTRODE ES RET 2 PATE W/ 10FT CRD MPLR DISP

## (undated) DEVICE — VIOLET BRAIDED (POLYGLACTIN 910), SYNTHETIC ABSORBABLE SUTURE: Brand: COATED VICRYL

## (undated) DEVICE — BLADE SUR W37.2MM CUT H0.23MM GEN PURP

## (undated) DEVICE — GOWN SURG XL DISP LEV 3 AERO BLU RAGLAN SL

## (undated) DEVICE — TRAP POLYP W/ 2 SPEC TY CLR MAGNIFYING WIND

## (undated) DEVICE — KIT CLEAN ENDOKIT 1.1OZ GOWNX2

## (undated) DEVICE — LASSO POLYPECTOMY SNARE: Brand: LASSO

## (undated) DEVICE — KIT SPEC COLL 1ML WHT POLYPR TB W/ LIQ AMIES

## (undated) DEVICE — SOLUTION IRRIG 1000ML 0.9% NACL USP BTL

## (undated) NOTE — LETTER
2/16/2024          To Whom It May Concern:    Lena Macedo is currently under my medical care and may not return to work at this time.    Please excuse Lena for 1 weeks.  She may return to work on 2/20/2024.  Activity is restricted as follows: none.    If you require additional information please contact our office.        Sincerely,      Margaret Correa MD          Document generated by:  Margaret Correa MD

## (undated) NOTE — LETTER
6/18/2020              [address] [place] [address]        [place] [address]         Dear Pierre Benitez,    It was a pleasure to see you. Your PAP test was normal.  There is no need for further testing at this time.   I look forward to seeing you at

## (undated) NOTE — LETTER
3/19/2018          To Whom It May Concern:    Tristen Nieto is currently under my medical care and may not return to { school/work:419483112} at this time. Please excuse Katelin Bolanos for {NUMBERS 0-10:8612} {days weeks:3323::\"days\"}.   {HE/SHE :9480} may

## (undated) NOTE — LETTER
3/20/2018          To Whom It May Concern:    Lee Tellez is currently under my medical care and may not return to { school/work:299023159} at this time. Please excuse Deloris Garcia for {NUMBERS 0-10:0032} {days weeks:3323::\"days\"}.   {HE/SHE :8540} may

## (undated) NOTE — LETTER
12/18/2017              [address] [place] [address]        [place] [address]         To Whom It May Concern,    I am Lena's rheumatologist.  She has diffuse osteoarthritis, especially affecting her knees.     Long shifts aggravate her kn

## (undated) NOTE — ED AVS SNAPSHOT
[facility] Emergency Department    [place] [address]    Phone:  [phone]    Fax:  [phone]           Melissa Jenkins   MRN: [id number]    Department:  [facility] Emergency Department   Date of Visit:  6/15 and Class Registration line at [phone] or find a doctor online by visiting [email or web address].    IF THERE IS ANY CHANGE OR WORSENING OF YOUR CONDITION, CALL YOUR PRIMARY CARE PHYSICIAN AT ONCE OR RETURN IMMEDIATELY TO [address] [place] [address].     If

## (undated) NOTE — LETTER
[facility]   Administracion de Anestesia  Yo, Lena O Macedo, acepto ser atendido por un anestesiólogo, quien está especialmente capacitado para monitorearme y darme medicamento para hacerme dormir o mantenerme cómodo neil mi procedimiento.   Entiendo que mi anestesiólogo no es un empleado o agente de [facility]. Él o mago trabaja para [facility]  Raudel el paciente que solicita los servicios de anestesia, estoy de acuerdo con lo siguiente:  Permitir al anestesiólogo (médico de anestesia) que me suministre el medicamento y hacer los procedimientos adicionales que scarlett necesarios. Algunos ejemplos son: Al iniciar o utilizar sterling “IV” para suministrarme medicamentos, fluidos o king neil mi procedimiento, y colocarme sterling sonda de respiración, me ayudará a respirar cuando esté dormido (intubación). En el chanell de que mi corazón deje de funcionar correctamente, entiendo que mi anestesiólogo hará todo lo posible para salvar mi maday, a menos que los documentos de No resucitar de [facility] lo indiquen de otra manera.  Informar a mi anestesista antes del procedimiento:  Si estoy embarazada.  La última vez que comí o bebí algo.  Todos los medicamentos que arsen (incluyendo medicamentos recetados, suplementos herbales y pastillas que puedo comprar sin receta médica (incluyendo drogas en la cifuentes [medicamentos ilegales]). No informar a mi anestesiólogo acerca de estos medicamentos puede aumentar mi riesgo de complicaciones con la anestesia.  Si soy alérgico a cualquier cosa o he tenido previamente sterling reacción adversa a la anestesia.  Entiendo cómo la anestesia me ayudará (beneficios).  Entiendo que con cualquier tipo de anestesia hay riesgos:  Los riesgos más comunes son: náuseas, vómitos, dolor de garganta, dolor muscular, daño a los ojos, la boca o los dientes (por la colocación de la sonda de respiración).  Los riesgos poco comunes incluyen: recordar  lo que sucedió neil mi procedimiento, reacciones alérgicas a los medicamentos, lesiones en las vías respiratorias, el corazón, los pulmones, la visión, los nervios o músculos, y en casos sumamente raros, la muerte.  Mii médico me ha explicado otras opciones de atención disponibles para mí (alternativas).  Pacientes embarazadas (“epidural”):    Entiendo que los riesgos de recibir sterling inyección epidural (medicamento que se aplica en la espalda para ayudar a controlar el dolor neil el parto), incluyen picazón, presión arterial baja, dificultad para orinar, dolor de charles o disminución en el ritmo del corazón del bebé. Los riesgos muy poco comunes incluyen infección, hemorragia, convulsiones, ritmo cardíaco irregular y lesión del nervio.  Anestesia regional (“columna vertebral”, “epidural” y “bloqueo de los nervios”):  Entiendo que hay complicaciones poco frecuentes colleen posibles, e incluyen dolor de charles, sangrado, infección, convulsiones, ritmo cardíaco irregular y la lesión del nervio.  .   Puedo cambiar de opinión acerca de recibir servicios de anestesia en cualquier momento antes de que se me administre el medicamento.         Paciente (o representante) Firma/Relación con el paciente  Fecha  Hora  Patient Signature/Signature of Responsible person Date Time           Nombre del intérprete (en esquivel chanell)  Idioma/Organización  Hora  Name of  Language/Organization Time          Firma del anestesiólogo Fecha  Hora  Signature of anesthesiologist Date Time    He hablado del procedimiento y la información anterior con el paciente (o el representante del paciente) y respondí rocío preguntas. El paciente o esquivel representante ha aceptado recibir los servicios de anestesia.         Testigo Fecha  Hora  Witness Date Time  He verificado que la firma es la del paciente o del representante del paciente, y que se firmó antes del procedimiento.    Nombre del paciente: Lena O Remington Shaver.: 3/10/1960                  En letra de imprenta: April 1, 2024  Expediente médico No. [id number]                         Página 1 de 2  ----------ANESTHESIA CONSENT----------

## (undated) NOTE — Clinical Note
Thank you for the consult. I saw Ms. Corinne Rodgers in the endocrine/diabetes clinic today. Please see attached my note. Please feel free to contact me with any questions. Thanks!

## (undated) NOTE — LETTER
3/20/2018              [address] [place] [address] [address] [address]         To Whom It May Concern:        Delton Holstein is under my care and being treated for Osteoarthritis and Fibromyalgia.  She is stable and capable of working  wi

## (undated) NOTE — LETTER
3/19/2018              [address] [place], [place] [address]         To Whom it may concern:     This is to certify that Tristen Nieto had an appointment on 3/19/2018 at 12:14 PM with John Hdz MD.        Sincerely,

## (undated) NOTE — LETTER
Patient Name: Lena Macedo : 3/10/1960  Medical Record #: [id number] Printed: 2024  Page 1 of 2                                          [facility]  [address], [place], [place]  Autorización para operación y procedimiento quirúrgico                                                                                                      Por la presente, autorizo a Marcela Juan MD, mi médico y al asistente a realizar la operación/procedimiento quirúrgico a continuación, así jorge luis a administrar la anestesia que determine necesaria mi médico Nombre (s) de la operación/procedimiento: COLONOSCOPY en [place]     Reconozco que neil la operación/procedimiento quirúrgico, las condiciones imprevistas pueden requerir de procedimientos adicionales o diferentes a aquellos mencionados anteriormente.  Por lo tanto, autorizo y solicito además que el cirujano antes mencionado, los asistentes o las personas designadas realicen los procedimientos que, a esquivel juicio, scarlett necesarios y convenientes.    Mi cirujano/médico eli discutido antes de mi cirugía los posibles beneficios, riesgos y efectos secundarios de maria t procedimiento, la probabilidad de alcanzar las metas y los posibles problemas que puedan ocurrir neil la recuperación.  Ellos también cartagena discutido las alternativas razonables al procedimiento, incluso los riesgos, beneficios y efectos secundarios relacionados con las alternativas y los riesgos relacionados con no realizar maria t procedimiento.  Cartagena respondido a todas mis preguntas y confirmo que no se ha dado ninguna garantía en cuanto a los resultados que pueda obtener.    En chanell de que surja la necesidad neil mi operación o neil el periodo postoperatorio, también autorizo se aplique king y/o productos sanguíneos.  Asimismo, entiendo que a pesar de las cuidadosas pruebas y análisis de king o de los productos sanguíneos que realizan las entidades recolectoras,  todavía puedo estar sujeto a efectos adversos jorge luis resultado de recibir sterling transfusión de king y/o productos sanguíneos.  A continuación se mencionan algunos, aunque no todos, los riesgos potenciales que pueden ocurrir: fiebre y reacciones alérgicas, reacciones hemolíticas, trasmisión de enfermedades jorge luis hepatitis, SIDA y citomegalovirus (CMV), así jorge luis sobrecarga de líquidos.   En chanell de que desee tener sterling transfusión autóloga de mi propia king o sterling transfusión de un donante dirigido.  Lo discutiré con mi médico.  Check only if Refusing Blood or Blood Products  I understand refusal of blood or blood products as deemed necessary by my physician may have serious consequences to my condition to include possible death. I hereby assume responsibility for my refusal and release the hospital, its personnel, and my physicians from any responsibility for the consequences of my refusal.           o  Refuse       Autorizo el uso de cualquier muestra, órgano, tejido, parte del cuerpo u objeto extraño que pueda ser extraído de mi cuerpo neil la operación/procedimiento para fines de diagnóstico, investigación o de enseñanza y esquivel desecho posterior por las autoridades del hospital.  También, autorizo la revelación de los resultados de las pruebas de muestras y/o los informes escritos al médico tratante jorge luis personal médico del hospital u otros médicos de referencia o consulta involucrados en mi atención, a discreción del patólogo o de mi médico tratante.    Doy consentimiento para que se fotografíen o graben vídeos de las operaciones o procedimientos a realizarse, incluidas las partes de mi cuerpo que scarlett adecuadas para propósitos médicos, científicos o educativos, en el entendido de que, mi identidad no sea revelada por las fotografías o por textos descriptivos que las acompañen.  Si el procedimiento es fotografiado o grabado en vídeo, el cirujano obtendrá la imagen, cinta de vídeo o CD original.  El hospital no se  hará responsable por el almacenamiento, la revelación o el mantenimiento de la imagen, cinta o CD.    Autorizo la presencia de un especialista de producto u observadores en el quirófano, según lo considere necesario mi médico o las personas que éste designe.     Reconozco que en chanell de que mi procedimiento resulte en un tiempo prolongado de radiografía/fluoroscopia, puedo desarrollar sterling reacción en la piel.    Si tengo sterling orden de No intentar la reanimación (KAYLIN), nsiha estado se suspenderá mientras esté en el quirófano, la romero de procedimientos y neil el periodo de recuperación a menos que yo indique lo contrario explícitamente (o sterling persona autorizada a fredo el consentimiento en mi nombre). El cirujano o mi médico tratante determinarán cuándo termina el periodo de recuperación aplicable a los efectos de restablecer la orden de KAYLIN.  Pacientes que se realizan un procedimiento de esterilización: Entiendo que si el procedimiento tiene éxito, los resultados serán permanentes y que, por lo tanto, me será imposible inseminar, concebir o tener hijos.  Asimismo, entiendo que el procedimiento tiene jorge luis propósito la esterilidad, aunque el resultado no está garantizado.   Admito que mi médico me ha explicado la aplicación de sedación/analgesia, incluidos los riesgos y beneficios y, consiento a la administración de sedación/analgesia conforme sea necesario o conveniente a juicio de mi médico.      CERTIFICO QUE HE LEÍDO Y COMPRENDIDO EL CONSENTIMIENTO ANTERIOR PARA LA OPERACIÓN y/o PROCEDIMIENTO.             ______________________________________  _______________________________  Firma del paciente      Firma de la persona responsible  Signature of patient      Signature of responsible person                        ___________________________________                                   Nombre en imprenta de la persona responsible         Name of responsible person          ___________________________________                  Relación con el paciente         Relationship to patient    _________________________________________  ______________ ________________  Firma del testigo          Fecha / Date Hora / Time  Signature of witness    DECLARACÓN DEL MÉDICO Mediante mi firma al calce, afirmo que antes de la hora del procedimiento, he explicadoal paciente y/o a esquivel representante legal, los riesgos y beneficios involucrados en el tratamiento propuesto, así comocualquier alternativa razonable al tratamiento propuesto. También le(s) he explicado los riesgos y beneficios involucradosen el rechazo del tratarniento propuesto y alternativas al tratamiento propuesto, y he respondido a las preguntas del(la) paciente(My signature below affirms that prior to the time of the procedure, I have explained to the patient and/or his/her guardian, therisks and benefits involved in the proposed treatment and any reasonable alternative to the proposed treatment. I have alsoexplained the risks and benefits involved in refusal of the proposed treatment and have answered the patient's questions.)    ________________________________________   _________________________   _____________   (Firma del médico/Signature of Physician)                                    (Fecha/Date)                                             (Hora/Time)      Patient Name: Lena Macedo     : 3/10/1960                 Printed: 2024      Medical Record #: [id number]                                              Page 2 of 2

## (undated) NOTE — LETTER
Date & Time: 7/26/2020, 4:29 PM  Patient: Candace Silver  Encounter Provider(s):    Destinee Merchant MD       To Whom It May Concern:    Marga Mccabe was seen and treated in our department on 7/26/2020.  Please excuse from [address] [facility] until cleared by Kimani Levy

## (undated) NOTE — LETTER
11/16/2018          To Whom It May Concern:    Laurel High is currently under my medical care and may return to work at this time. She may return to work on 11/19/18.   She should start with working 8 hrs a day, 5 days a week and may increase jorge

## (undated) NOTE — MR AVS SNAPSHOT
[address] [address]  [phone]               Thank you for choosing us for your health care visit with Sabrina Lofton MD.  We are glad to serve you and happy to provide you with this sum referral. Services ordered by your physician may not be covered unless prior authorization is obtained in accordance with your insurance company's guidelines. Unauthorized care may be your financial responsibility.  If you have questions, please call your p gabapentin 300 MG Caps   TAKE 1-2 CAPSULES BY MOUTH AT BEDTIME   Commonly known as:  NEURONTIN           Ibuprofen 200 MG Caps   Take 1 tablet by mouth as needed.            naproxen 500 MG Tabs   Take 1 tablet (500 mg total) by mouth 2 (two) times daily a Water is best for hydration Fast food. Eat at home when possible     Tips for increasing your physical activity – Adults who are physically active are less likely to develop some chronic diseases than adults who are inactive.      HOW TO GET STARTED: HOW

## (undated) NOTE — LETTER
2/13/2024          To Whom It May Concern:    Lena Macedo is currently under my medical care and may not return to work at this time.    Please excuse Lena for 7 days.  She may return to work on 02/19/2024.  Activity is restricted as follows: none.    If you require additional information please contact our office.        Sincerely,    Margaret Correa MD          Document generated by:  Payton AUSTIN MA

## (undated) NOTE — ED AVS SNAPSHOT
Susanna Boyce   MRN: [id number]    Department:  [facility] Emergency Department   Date of Visit:  4/28/2019           Disclosure     Insurance plans vary and the physician(s) referred by the ER may not be covered by your plan.  Please contact CARE PHYSICIAN AT ONCE OR RETURN IMMEDIATELY TO THE EMERGENCY DEPARTMENT. If you have been prescribed any medication(s), please fill your prescription right away and begin taking the medication(s) as directed.   If you believe that any of the medications

## (undated) NOTE — LETTER
11/5/2018              [address] [place] [address] [address] [address]         To Whom it may Concern:    Noelle Ratliff had a serious medical condition which will prohibit her from working this week.   She was hospitalized with a life threat

## (undated) NOTE — LETTER
2/19/2024          To Whom It May Concern:    Lena Macedo is currently under my medical care and may not return to work at this time.    She may return to work on 02/22/24  Activity is restricted as follows: none.    If you require additional information please contact our office.        Sincerely,    MARY ANN Garibay

## (undated) NOTE — LETTER
3/20/2018          To Whom It May Concern:    Natalie Mathews is currently under my medical care and may not return to { school/work:[id number]} at this time. Please excuse Mare Chowdhury for {NUMBERS 0-10:1149} {days weeks:3323::\"days\"}.   {HE/SHE :2305} may

## (undated) NOTE — Clinical Note
6/19/2017              [address] [place] [address] [address] [address]         To Whom It May Concern,    I am Lena's Rheumatologist. She has arthritis. If possible, she should not be lifting more than 20 pounds on the job.     Than

## (undated) NOTE — LETTER
1/27/2020              [address] [facility] [address]         Estimado/a Jasmin Child enviamos esta carta para informarle que nuestra oficina eli intentado ponerse en contacto con usted por teléfono sin éxito.  El motivo

## (undated) NOTE — LETTER
[facility]   Administracion de Anestesia  Yo, Lena O Macedo, acepto ser atendido por un anestesiólogo, quien está especialmente capacitado para monitorearme y darme medicamento para hacerme dormir o mantenerme cómodo neil mi procedimiento.   Entiendo que mi anestesiólogo no es un empleado o agente de [facility]. Él o mago trabaja para [facility]  Raudel el paciente que solicita los servicios de anestesia, estoy de acuerdo con lo siguiente:  Permitir al anestesiólogo (médico de anestesia) que me suministre el medicamento y hacer los procedimientos adicionales que scarlett necesarios. Algunos ejemplos son: Al iniciar o utilizar sterling “IV” para suministrarme medicamentos, fluidos o king neil mi procedimiento, y colocarme sterling sonda de respiración, me ayudará a respirar cuando esté dormido (intubación). En el chanell de que mi corazón deje de funcionar correctamente, entiendo que mi anestesiólogo hará todo lo posible para salvar mi maday, a menos que los documentos de No resucitar de [facility] lo indiquen de otra manera.  Informar a mi anestesista antes del procedimiento:  Si estoy embarazada.  La última vez que comí o bebí algo.  Todos los medicamentos que arsen (incluyendo medicamentos recetados, suplementos herbales y pastillas que puedo comprar sin receta médica (incluyendo drogas en la cifuentes [medicamentos ilegales]). No informar a mi anestesiólogo acerca de estos medicamentos puede aumentar mi riesgo de complicaciones con la anestesia.  Si soy alérgico a cualquier cosa o he tenido previamente sterling reacción adversa a la anestesia.  Entiendo cómo la anestesia me ayudará (beneficios).  Entiendo que con cualquier tipo de anestesia hay riesgos:  Los riesgos más comunes son: náuseas, vómitos, dolor de garganta, dolor muscular, daño a los ojos, la boca o los dientes (por la colocación de la sonda de respiración).  Los riesgos poco comunes incluyen: recordar  lo que sucedió neil mi procedimiento, reacciones alérgicas a los medicamentos, lesiones en las vías respiratorias, el corazón, los pulmones, la visión, los nervios o músculos, y en casos sumamente raros, la muerte.  Mii médico me ha explicado otras opciones de atención disponibles para mí (alternativas).  Pacientes embarazadas (“epidural”):    Entiendo que los riesgos de recibir sterling inyección epidural (medicamento que se aplica en la espalda para ayudar a controlar el dolor neil el parto), incluyen picazón, presión arterial baja, dificultad para orinar, dolor de charles o disminución en el ritmo del corazón del bebé. Los riesgos muy poco comunes incluyen infección, hemorragia, convulsiones, ritmo cardíaco irregular y lesión del nervio.  Anestesia regional (“columna vertebral”, “epidural” y “bloqueo de los nervios”):  Entiendo que hay complicaciones poco frecuentes colleen posibles, e incluyen dolor de charles, sangrado, infección, convulsiones, ritmo cardíaco irregular y la lesión del nervio.  .   Puedo cambiar de opinión acerca de recibir servicios de anestesia en cualquier momento antes de que se me administre el medicamento.         Paciente (o representante) Firma/Relación con el paciente  Fecha  Hora  Patient Signature/Signature of Responsible person Date Time           Nombre del intérprete (en esquivel chanell)  Idioma/Organización  Hora  Name of  Language/Organization Time          Firma del anestesiólogo Fecha  Hora  Signature of anesthesiologist Date Time    He hablado del procedimiento y la información anterior con el paciente (o el representante del paciente) y respondí rocío preguntas. El paciente o esquivel representante ha aceptado recibir los servicios de anestesia.         Testigo Fecha  Hora  Witness Date Time  He verificado que la firma es la del paciente o del representante del paciente, y que se firmó antes del procedimiento.    Nombre del paciente: Lena O Remington Shaver.: 3/10/1960                  En letra de imprenta: June 11, 2024  Expediente médico No. [id number]                         Página 1 de 2  ----------ANESTHESIA CONSENT----------

## (undated) NOTE — ED AVS SNAPSHOT
[facility] Emergency [facility]    [address]     [address] [address]    Phone:  [phone]    Fax:  [phone]           Juancarlos Rosales   MRN: [id number]    Department:  [facility] Emergency Department   Date of Visit:  6/15 If you are having difficulty getting an appointment with physician, please notify the ED Case Manager at [phone].     Take medicines as prescribed        Disclosure     Insurance plans vary and the physician(s) referred by the ER may not be covered IF THERE IS ANY CHANGE OR WORSENING OF YOUR CONDITION, CALL YOUR PRIMARY CARE PHYSICIAN AT ONCE OR RETURN IMMEDIATELY TO THE EMERGENCY DEPARTMENT.     If you have been prescribed any medication(s), please fill your prescription right away and begin taking t - If you have concerns related to behavioral health issues or thoughts of harming yourself, contact [address] [place] [address] at [phone].     - If you don’t have insurance, Leena Forde has partnered with [employer]

## (undated) NOTE — LETTER
9/21/2022           To Whom It May Concern:    Madai Noguera is currently under my medical care and may not return to work at this time. She may return to work pending COVID-19 results   Activity is restricted as follows: none. If you require additional information please contact our office.         Sincerely,      MARY ANN Gonzalez          Document generated by:  MARY ANN Gonzalez

## (undated) NOTE — LETTER
2/2/2023          To Whom It May Concern:    Mike Tirado is currently under my medical care and her last HbA1C was 6.2. If you require additional information please contact our office. Sincerely,    Prieto Rogel MD          Document generated by:   Prieto Rogel MD

## (undated) NOTE — LETTER
11/10/2018              [address] [place], [place] [address] [address]         Dear Kristen Soliz,    It was a pleasure to see you. Your mammogram was normal.  There is no need for further testing at this time.   I look forward to seeing you a

## (undated) NOTE — LETTER
3/20/2018              [address] [place] [address] [address] [address]         To Whom It May Concern:      Xiomara Aranda is under my care and being treated for Osteoarthritis and Fibromyalgia.  She is stable and capable of working 8 jorge

## (undated) NOTE — LETTER
10/29/2018          To Whom It May Concern:    Lee Tellez is currently under my medical care and may not return to work at this time. She may return to work on November 6, 2018. Activity is restricted as follows: none.     If you require additional

## (undated) NOTE — LETTER
Bryan rPince [address], [address]       04/24/18        Patient: Martin Cadena   YOB: 1960   Date of Visit: 4/24/2018       Dear  Dr. Chloé Patrick,      Thank you for referring Martin Tammi to my

## (undated) NOTE — LETTER
Date: 1/4/2025    Patient Name: Lena Macedo          To Whom it may concern:    This letter has been written at the patient's request. The above patient was seen at [facility] for treatment of a medical condition.    This patient should be excused from attending work/school from *** through ***.    The patient may return to work/school on *** with the following limitations ***.        Sincerely,    No name on file

## (undated) NOTE — MR AVS SNAPSHOT
After Visit Summary   11/26/2018    Harvey Coffman    MRN: [id number]           Visit Information     Date & Time  11/26/2018 10:45 AM Provider  Luther Christopher, [address] [place] [address] Kayla Coffman, [facility]    3/18/2019 11:00 AM [facility], [address], [place]       November 26, 2018      Trenda Curling   [address] [facility]     Dear Uma Bailey :    Thank you for enroll patients. Video Visits are available Monday - Friday for many common conditions such as allergies, colds, cough, fever, rash, sore throat, headache and pink eye.   The cost for a Video Visit is currently $35.         If you receive a survey from [employer] *Cost varies based on your insurance coverage  For more information about hours, locations or appointment options available at [facility],  visit: [email or web address] or call [phone]. [phone]. ([phone])

## (undated) NOTE — LETTER
5/22/2022              Isreal Herrera        [address] [address]         To whom it may concern,    Isreal Herrera is currently a patient under my medical care. The patient's medical condition makes serving on jury duty inadvisable at this time. Please excuse them from serving. If you require additional information please do not hesitate to contact my office. Sincerely,        Saurabh Pack MD  [address] [address] [phone]        Document electronically generated by:   Saurabh Pack MD

## (undated) NOTE — MR AVS SNAPSHOT
[address] [address]  [phone]               Thank you for choosing us for your health care visit with Cyrus Stevens MD.  We are glad to serve you and happy to provide you with this sum Commonly known as:  NEURONTIN           Ibuprofen 200 MG Caps   Take 1 tablet by mouth as needed.            Omeprazole 40 MG Cpdr   TOME GINNY CAPSULA POR VIA ORAL TODOS LOS FOSTER           * Sertraline HCl 100 MG Tabs   Take one Q AM   What changed:  Another day (2.4 g sodium or 6 g sodium chloride)   Aerobic physical activity Regular aerobic physical activity (e.g., brisk walking, light jogging, cycling, swimming, etc.) for a goal of at least 150 minutes per week.    Moderation of alcohol consumption Men: Londell Gowers

## (undated) NOTE — LETTER
Patient Name: Lena Macedo : 3/10/1960  Medical Record #: [id number] Printed: 2024  Page 1 of 2                                          [facility]  [address], [place], [place]  Autorización para operación y procedimiento quirúrgico                                                                                                      Por la presente, autorizo a Marcela Juan MD, mi médico y al asistente a realizar la operación/procedimiento quirúrgico a continuación, así jorge luis a administrar la anestesia que determine necesaria mi médico Nombre (s) de la operación/procedimiento: COLONOSCOPY en [place]     Reconozco que neil la operación/procedimiento quirúrgico, las condiciones imprevistas pueden requerir de procedimientos adicionales o diferentes a aquellos mencionados anteriormente.  Por lo tanto, autorizo y solicito además que el cirujano antes mencionado, los asistentes o las personas designadas realicen los procedimientos que, a esquivel juicio, scarlett necesarios y convenientes.    Mi cirujano/médico eil discutido antes de mi cirugía los posibles beneficios, riesgos y efectos secundarios de maria t procedimiento, la probabilidad de alcanzar las metas y los posibles problemas que puedan ocurrir neil la recuperación.  Ellos también cartagena discutido las alternativas razonables al procedimiento, incluso los riesgos, beneficios y efectos secundarios relacionados con las alternativas y los riesgos relacionados con no realizar maria t procedimiento.  [place] respondido a todas mis preguntas y confirmo que no se ha dado ninguna garantía en cuanto a los resultados que pueda obtener.    En chanell de que surja la necesidad neil mi operación o neil el periodo postoperatorio, también autorizo se aplique king y/o productos sanguíneos.  Asimismo, entiendo que a pesar de las cuidadosas pruebas y análisis de king o de los productos sanguíneos que realizan las entidades  recolectoras, todavía puedo estar sujeto a efectos adversos jorge luis resultado de recibir sterling transfusión de king y/o productos sanguíneos.  A continuación se mencionan algunos, aunque no todos, los riesgos potenciales que pueden ocurrir: fiebre y reacciones alérgicas, reacciones hemolíticas, trasmisión de enfermedades jorge luis hepatitis, SIDA y citomegalovirus (CMV), así jorge luis sobrecarga de líquidos.   En chanell de que desee tener sterling transfusión autóloga de mi propia king o sterling transfusión de un donante dirigido.  Lo discutiré con mi médico.  Check only if Refusing Blood or Blood Products  I understand refusal of blood or blood products as deemed necessary by my physician may have serious consequences to my condition to include possible death. I hereby assume responsibility for my refusal and release the hospital, its personnel, and my physicians from any responsibility for the consequences of my refusal.           o  Refuse       Autorizo el uso de cualquier muestra, órgano, tejido, parte del cuerpo u objeto extraño que pueda ser extraído de mi cuerpo neil la operación/procedimiento para fines de diagnóstico, investigación o de enseñanza y esquivel desecho posterior por las autoridades del hospital.  También, autorizo la revelación de los resultados de las pruebas de muestras y/o los informes escritos al médico tratante jorge luis personal médico del hospital u otros médicos de referencia o consulta involucrados en mi atención, a discreción del patólogo o de mi médico tratante.    Doy consentimiento para que se fotografíen o graben vídeos de las operaciones o procedimientos a realizarse, incluidas las partes de mi cuerpo que scarlett adecuadas para propósitos médicos, científicos o educativos, en el entendido de que, mi identidad no sea revelada por las fotografías o por textos descriptivos que las acompañen.  Si el procedimiento es fotografiado o grabado en vídeo, el cirujano obtendrá la imagen, cinta de vídeo o CD original.  El  hospital no se hará responsable por el almacenamiento, la revelación o el mantenimiento de la imagen, cinta o CD.    Autorizo la presencia de un especialista de producto u observadores en el quirófano, según lo considere necesario mi médico o las personas que éste designe.     Reconozco que en chanell de que mi procedimiento resulte en un tiempo prolongado de radiografía/fluoroscopia, puedo desarrollar sterling reacción en la piel.    Si tengo sterling orden de No intentar la reanimación (KAYLIN), nisha estado se suspenderá mientras esté en el quirófano, la romero de procedimientos y neil el periodo de recuperación a menos que yo indique lo contrario explícitamente (o sterling persona autorizada a fredo el consentimiento en mi nombre). El cirujano o mi médico tratante determinarán cuándo termina el periodo de recuperación aplicable a los efectos de restablecer la orden de KAYLIN.  Pacientes que se realizan un procedimiento de esterilización: Entiendo que si el procedimiento tiene éxito, los resultados serán permanentes y que, por lo tanto, me será imposible inseminar, concebir o tener hijos.  Asimismo, entiendo que el procedimiento tiene jorge luis propósito la esterilidad, aunque el resultado no está garantizado.   Admito que mi médico me ha explicado la aplicación de sedación/analgesia, incluidos los riesgos y beneficios y, consiento a la administración de sedación/analgesia conforme sea necesario o conveniente a juicio de mi médico.      CERTIFICO QUE HE LEÍDO Y COMPRENDIDO EL CONSENTIMIENTO ANTERIOR PARA LA OPERACIÓN y/o PROCEDIMIENTO.             ______________________________________  _______________________________  Firma del paciente      Firma de la persona responsible  Signature of patient      Signature of responsible person                        ___________________________________                                   Nombre en imprenta de la persona responsible         Name of responsible  person          ___________________________________                 Relación con el paciente         Relationship to patient    _________________________________________  ______________ ________________  Firma del testigo          Fecha / Date Hora / Time  Signature of witness    DECLARACÓN DEL MÉDICO Mediante mi firma al calce, afirmo que antes de la hora del procedimiento, he explicadoal paciente y/o a esquivel representante legal, los riesgos y beneficios involucrados en el tratamiento propuesto, así comocualquier alternativa razonable al tratamiento propuesto. También le(s) he explicado los riesgos y beneficios involucradosen el rechazo del tratarniento propuesto y alternativas al tratamiento propuesto, y he respondido a las preguntas del(la) paciente(My signature below affirms that prior to the time of the procedure, I have explained to the patient and/or his/her guardian, therisks and benefits involved in the proposed treatment and any reasonable alternative to the proposed treatment. I have alsoexplained the risks and benefits involved in refusal of the proposed treatment and have answered the patient's questions.)    ________________________________________   _________________________   _____________   (Firma del médico/Signature of Physician)                                    (Fecha/Date)                                             (Hora/Time)      Patient Name: Lena Macedo     : 3/10/1960                 Printed: 2024      Medical Record #: [id number]                                              Page 2 of 2

## (undated) NOTE — LETTER
Yesy 15, 2017    Patient: Nile Magaña   Date of Visit: 6/15/2017       To Whom It May Concern:    Amilcar Mtz was seen and treated in our emergency department on 6/15/2017. She cannot work for the next 2-3 days. .    If you have any questions or c